# Patient Record
Sex: FEMALE | Race: WHITE | NOT HISPANIC OR LATINO | Employment: OTHER | URBAN - METROPOLITAN AREA
[De-identification: names, ages, dates, MRNs, and addresses within clinical notes are randomized per-mention and may not be internally consistent; named-entity substitution may affect disease eponyms.]

---

## 2017-02-03 DIAGNOSIS — Z78.0 MENOPAUSE: ICD-10-CM

## 2017-04-07 ENCOUNTER — HOSPITAL ENCOUNTER (OUTPATIENT)
Dept: RADIOLOGY | Facility: MEDICAL CENTER | Age: 67
End: 2017-04-07
Attending: FAMILY MEDICINE
Payer: MEDICARE

## 2017-04-07 DIAGNOSIS — Z78.0 MENOPAUSE: ICD-10-CM

## 2017-04-07 DIAGNOSIS — Z00.00 HEALTH CARE MAINTENANCE: ICD-10-CM

## 2017-04-07 PROCEDURE — 77080 DXA BONE DENSITY AXIAL: CPT

## 2017-04-07 PROCEDURE — 77063 BREAST TOMOSYNTHESIS BI: CPT

## 2017-04-10 PROBLEM — M85.80 OSTEOPENIA: Status: ACTIVE | Noted: 2017-04-10

## 2017-05-19 ENCOUNTER — HOSPITAL ENCOUNTER (OUTPATIENT)
Dept: CARDIOLOGY | Facility: MEDICAL CENTER | Age: 67
End: 2017-05-19
Attending: FAMILY MEDICINE
Payer: MEDICARE

## 2017-05-19 DIAGNOSIS — R01.1 HEART MURMUR: ICD-10-CM

## 2017-05-19 LAB
LV EJECT FRACT  99904: 70
LV EJECT FRACT MOD 2C 99903: 70.39
LV EJECT FRACT MOD 4C 99902: 71.16
LV EJECT FRACT MOD BP 99901: 71.36

## 2017-05-19 PROCEDURE — 93306 TTE W/DOPPLER COMPLETE: CPT

## 2017-05-19 PROCEDURE — 93306 TTE W/DOPPLER COMPLETE: CPT | Mod: 26 | Performed by: INTERNAL MEDICINE

## 2017-05-22 DIAGNOSIS — I35.0 AORTIC STENOSIS, MILD: ICD-10-CM

## 2017-05-23 ENCOUNTER — OFFICE VISIT (OUTPATIENT)
Dept: MEDICAL GROUP | Facility: PHYSICIAN GROUP | Age: 67
End: 2017-05-23
Payer: MEDICARE

## 2017-05-23 VITALS
BODY MASS INDEX: 29.81 KG/M2 | TEMPERATURE: 95.9 F | DIASTOLIC BLOOD PRESSURE: 74 MMHG | HEIGHT: 62 IN | RESPIRATION RATE: 14 BRPM | HEART RATE: 60 BPM | OXYGEN SATURATION: 93 % | WEIGHT: 162 LBS | SYSTOLIC BLOOD PRESSURE: 134 MMHG

## 2017-05-23 DIAGNOSIS — I35.0 AORTIC STENOSIS, MILD: ICD-10-CM

## 2017-05-23 DIAGNOSIS — M65.332 TRIGGER MIDDLE FINGER OF LEFT HAND: ICD-10-CM

## 2017-05-23 PROCEDURE — 3014F SCREEN MAMMO DOC REV: CPT | Performed by: FAMILY MEDICINE

## 2017-05-23 PROCEDURE — G8419 CALC BMI OUT NRM PARAM NOF/U: HCPCS | Performed by: FAMILY MEDICINE

## 2017-05-23 PROCEDURE — 1101F PT FALLS ASSESS-DOCD LE1/YR: CPT | Performed by: FAMILY MEDICINE

## 2017-05-23 PROCEDURE — 4040F PNEUMOC VAC/ADMIN/RCVD: CPT | Mod: 8P | Performed by: FAMILY MEDICINE

## 2017-05-23 PROCEDURE — 20550 NJX 1 TENDON SHEATH/LIGAMENT: CPT | Performed by: FAMILY MEDICINE

## 2017-05-23 PROCEDURE — 1036F TOBACCO NON-USER: CPT | Performed by: FAMILY MEDICINE

## 2017-05-23 PROCEDURE — 99212 OFFICE O/P EST SF 10 MIN: CPT | Mod: 25 | Performed by: FAMILY MEDICINE

## 2017-05-23 PROCEDURE — G8432 DEP SCR NOT DOC, RNG: HCPCS | Performed by: FAMILY MEDICINE

## 2017-05-23 PROCEDURE — 3017F COLORECTAL CA SCREEN DOC REV: CPT | Mod: 8P | Performed by: FAMILY MEDICINE

## 2017-05-23 ASSESSMENT — PATIENT HEALTH QUESTIONNAIRE - PHQ9: CLINICAL INTERPRETATION OF PHQ2 SCORE: 0

## 2017-05-23 NOTE — PROGRESS NOTES
Patient comes in complaining of a trigger finger in her left middle finger. It annoys her and is unpredictable. She's had trigger thumbs in the past and I have injected those successfully area and she requests a Kenalog injection for her left middle finger. She has no known trauma to this finger that she can think of. She is right-handed.  She is also here to discuss her recent echocardiogram which showed mild aortic stenosis. She has no shortness of breath. She has no chest pains.    I reviewed the following    History reviewed. No pertinent past medical history.     Past Surgical History   Procedure Laterality Date   • Abdominal hysterectomy total  1989     Precancerous Changes in Ovarian Cyst   • Ovarian cystectomy  1988     left sided Precancerous Cyst---was pregnant at the time  Dr Mckeon   • Appendectomy  1976   • Tonsillectomy and adenoidectomy  1954       Allergies   Allergen Reactions   • Iodine      IV Contrast   Anaphylaxis    • Penicillins      Rash       Current Outpatient Prescriptions   Medication Sig Dispense Refill   • Multiple Vitamin (MULTI-VITAMIN PO) Take  by mouth.     • Calcium 200 MG Tab Take 600 mg by mouth.     • vitamin D3, cholecalciferol, 1000 UNIT Tab Take 2 Tabs by mouth every day. 100 Tab 3   • NON SPECIFIED esratavan OTC     • VITAMIN E PO Take  by mouth.     • naproxen (ALEVE) 220 MG tablet Take 220 mg by mouth 2 times a day, with meals.     • GLUCOSAMINE-CHONDROITIN PO Take  by mouth.       No current facility-administered medications for this visit.        History reviewed. No pertinent family history.    Social History     Social History   • Marital Status:      Spouse Name: N/A   • Number of Children: N/A   • Years of Education: N/A     Occupational History   • Not on file.     Social History Main Topics   • Smoking status: Never Smoker    • Smokeless tobacco: Never Used   • Alcohol Use: Yes      Comment: occasional   • Drug Use: No   • Sexual Activity:     Partners:  Male     Birth Control/ Protection: Surgical      Comment: patient has had a hysterectomy for ovarian cysts.     Other Topics Concern   • Not on file     Social History Narrative          The patient is well-developed well-nourished and in no acute distress    Pupils equally round and reactive to light    Patient has a trigger phenomenon in her left middle finger. I can feel no contractures in either palm    Pulses 2+ in all 4 extremities    1. Trigger middle finger of left hand   The patient was placed supine on the examining table and the palm of the hand was prepped with ACU-dyne. Using a 3 cc syringe and a 1/2 inch 27-gauge needle I injected 1 cc of 40 mg Kenalog, 1/2 cc of 0.5% Marcaine, and 1 cc of 1% plain Xylocaine at the junction between the distal palmar crease and the longitudinal axis of the finger with the needle pointed towards the elbow. The finger was extended and just before the injection was carried out the patient flexed the finger slightly. This was well tolerated. There were no paresthesias and there was no bleeding. A Band-Aid was applied over the injection site.     2. Aortic stenosis, mild   patient has been referred [facility] who will be evaluating her in managing her for this issue.      Recheck when necessary    Please note that this dictation was created using voice recognition software. I have worked with consultants from the vendor as well as technical experts from [facility] [employer] to optimize the interface. I have made every reasonable attempt to correct obvious errors, but I expect that there are errors of grammar and possibly content that I did not discover before finalizing the note.

## 2017-05-23 NOTE — MR AVS SNAPSHOT
"        Regina Wheeler Kraig   2017 1:00 PM   Office Visit   MRN: [id number]    Department:  [facility]   Dept Phone:  [phone]    Description:  Female : 1950   Provider:  Ayaan Cormier M.D.           Reason for Visit     Trigger Finger left middle       Allergies as of 2017     Allergen Noted Reactions    Iodine 2013       IV Contrast   Anaphylaxis     Penicillins 2013       Rash      You were diagnosed with     Trigger middle finger of left hand   [545491]       Aortic stenosis, mild   [188431]         Vital Signs     Blood Pressure Pulse Temperature Respirations Height Weight    134/74 mmHg 60 35.5 °C (95.9 °F) 14 1.575 m (5' 2\") 73.483 kg (162 lb)    Body Mass Index Oxygen Saturation Smoking Status             29.62 kg/m2 93% Never Smoker          Basic Information     Date Of Birth Sex Race Ethnicity Preferred Language    1950 Female White Non- English      Your appointments     2017  3:20 PM   Established Patient with Ayaan Cormier M.D.   [facility] ([place])    [address] [place] [id number]   [phone]           You will be receiving a confirmation call a few days before your appointment from our automated call confirmation system.              Problem List              ICD-10-CM Priority Class Noted - Resolved    Trigger thumb of left hand M65.312   2013 - Present    Menopause Z78.0   7/15/2016 - Present    Vitamin D deficiency E55.9   2016 - Present    Osteopenia M85.80   4/10/2017 - Present    Aortic stenosis, mild I35.0   2017 - Present      Health Maintenance        Date Due Completion Dates    PAP SMEAR 11/15/1970 ---    COLONOSCOPY 11/15/1999 ---    IMM ZOSTER VACCINE 11/15/2009 ---    IMM PNEUMOCOCCAL 65+ (ADULT) LOW/MEDIUM RISK SERIES (1 of 2 - PCV13) 11/15/2014 ---    IMM DTaP/Tdap/Td Vaccine (2 - Td) 2017    MAMMOGRAM 2018    BONE DENSITY 2022   "         Current Immunizations     Influenza TIV (IM) 12/7/2012    Tdap Vaccine 8/2/2007      Below and/or attached are the medications your provider expects you to take. Review all of your home medications and newly ordered medications with your provider and/or pharmacist. Follow medication instructions as directed by your provider and/or pharmacist. Please keep your medication list with you and share with your provider. Update the information when medications are discontinued, doses are changed, or new medications (including over-the-counter products) are added; and carry medication information at all times in the event of emergency situations     Allergies:  IODINE - (reactions not documented)     PENICILLINS - (reactions not documented)               Medications  Valid as of: May 23, 2017 -  2:13 PM    Generic Name Brand Name Tablet Size Instructions for use    Calcium (Tab) Calcium 200 MG Take 600 mg by mouth.        Cholecalciferol (Tab) vitamin D3 (cholecalciferol) 1000 UNIT Take 2 Tabs by mouth every day.        Glucosamine-Chondroitin   Take  by mouth.        Multiple Vitamin   Take  by mouth.        Naproxen Sodium (Tab) ANAPROX 220 MG Take 220 mg by mouth 2 times a day, with meals.        NON SPECIFIED   esratavan OTC        Vitamin E   Take  by mouth.        .                 Medicines prescribed today were sent to:     [id number] - [place], [place] - [address]    [address] [place] [address]    Phone: [phone] Fax: [phone]    Open 24 Hours?: No      Medication refill instructions:       If your prescription bottle indicates you have medication refills left, it is not necessary to call your provider’s office. Please contact your pharmacy and they will refill your medication.    If your prescription bottle indicates you do not have any refills left, you may request refills at any time through one of the following ways: The online [employer] system (except Urgent Care), by  calling your provider’s office, or by asking your pharmacy to contact your provider’s office with a refill request. Medication refills are processed only during regular business hours and may not be available until the next business day. Your provider may request additional information or to have a follow-up visit with you prior to refilling your medication.   *Please Note: Medication refills are assigned a new Rx number when refilled electronically. Your pharmacy may indicate that no refills were authorized even though a new prescription for the same medication is available at the pharmacy. Please request the medicine by name with the pharmacy before contacting your provider for a refill.           [email or web address] Access Code: Activation code not generated  Current [email or web address] Status: Active

## 2018-03-21 ENCOUNTER — OFFICE VISIT (OUTPATIENT)
Dept: MEDICAL GROUP | Facility: PHYSICIAN GROUP | Age: 68
End: 2018-03-21
Payer: MEDICARE

## 2018-03-21 VITALS
BODY MASS INDEX: 29.81 KG/M2 | DIASTOLIC BLOOD PRESSURE: 74 MMHG | RESPIRATION RATE: 14 BRPM | TEMPERATURE: 97.7 F | HEART RATE: 66 BPM | HEIGHT: 62 IN | OXYGEN SATURATION: 95 % | SYSTOLIC BLOOD PRESSURE: 128 MMHG | WEIGHT: 162 LBS

## 2018-03-21 DIAGNOSIS — E78.2 MIXED HYPERLIPIDEMIA: ICD-10-CM

## 2018-03-21 DIAGNOSIS — L57.0 ACTINIC KERATOSES: ICD-10-CM

## 2018-03-21 DIAGNOSIS — I35.0 AORTIC STENOSIS, MILD: ICD-10-CM

## 2018-03-21 DIAGNOSIS — M65.312 TRIGGER THUMB OF LEFT HAND: ICD-10-CM

## 2018-03-21 DIAGNOSIS — Z12.11 COLON CANCER SCREENING: ICD-10-CM

## 2018-03-21 DIAGNOSIS — E55.9 VITAMIN D DEFICIENCY: ICD-10-CM

## 2018-03-21 DIAGNOSIS — M85.80 OSTEOPENIA, UNSPECIFIED LOCATION: ICD-10-CM

## 2018-03-21 DIAGNOSIS — Z23 NEED FOR TDAP VACCINATION: ICD-10-CM

## 2018-03-21 DIAGNOSIS — Z23 NEED FOR PNEUMOCOCCAL VACCINATION: ICD-10-CM

## 2018-03-21 PROCEDURE — 17003 DESTRUCT PREMALG LES 2-14: CPT | Performed by: FAMILY MEDICINE

## 2018-03-21 PROCEDURE — 90670 PCV13 VACCINE IM: CPT | Performed by: FAMILY MEDICINE

## 2018-03-21 PROCEDURE — 17000 DESTRUCT PREMALG LESION: CPT | Performed by: FAMILY MEDICINE

## 2018-03-21 PROCEDURE — 99214 OFFICE O/P EST MOD 30 MIN: CPT | Mod: 25 | Performed by: FAMILY MEDICINE

## 2018-03-21 PROCEDURE — G0009 ADMIN PNEUMOCOCCAL VACCINE: HCPCS | Performed by: FAMILY MEDICINE

## 2018-03-21 NOTE — PROGRESS NOTES
The patient comes in for numerous issues. She last had mammograms in April 2017 that were normal. She has no family history of breast cancer. She has not noted any lumps or masses. She is due for mammograms again in April 2019.  She had a DEXA scan in April 2017 that showed osteopenia. She did not want to take Fosamax as she is taking extra calcium and is exercising more. We do need to check another DEXA scan.  The patient needs to be referred to cardiology for her history of aortic stenosis. She denies shortness of breath or chest pains.  She's had some trigger fingers and they are quiescent for now.  She is due for lab work.  She due for colon cancer screening.  She is due for Prevnar 13.  She is due for Tdap.  She has 2 spots--one on the dorsum of each hand--that are rough and do not heal. She wonders if she should see a dermatologist.      Review of Systems   Constitutional: Negative.  Negative for fever, chills, weight loss and malaise/fatigue.   HENT: Negative for hearing loss, ear pain, congestion, sore throat, neck pain and tinnitus.    Eyes: Negative for blurred vision, double vision and pain.   Respiratory: Negative for cough, hemoptysis, shortness of breath and wheezing.    Cardiovascular: Negative for chest pain, palpitations, orthopnea, claudication, leg swelling and PND.   Gastrointestinal: Negative for heartburn, nausea, vomiting, abdominal pain, diarrhea, constipation, blood in stool and melena.   Genitourinary: Negative for incontinence, dysuria, urgency, frequency and hematuria. Female-- Negative for pelvic pain, vaginal discharge, or abnormal bleeding. No breast lumps, or masses, nipple bleeding or discharge.   Musculoskeletal: Negative for myalgias, back pain and joint pain.   Skin: Negative for rash and itching. One lesion on the dorsum of each hand as mentioned above which have not healed.  Neurological: Negative for dizziness, tingling, tremors, focal weakness, seizures, loss of consciousness  and headaches.   Endo/Heme/Allergies: Negative for environmental allergies and polydipsia.  Does not bruise/bleed easily.   Psychiatric/Behavioral: Negative for depression, memory loss and substance abuse.  The patient is not nervous/anxious and does not have insomnia.  All others negative.      I reviewed the following    History reviewed. No pertinent past medical history.     Past Surgical History:   Procedure Laterality Date   • ABDOMINAL HYSTERECTOMY TOTAL  1989    Precancerous Changes in Ovarian Cyst   • OVARIAN CYSTECTOMY  1988    left sided Precancerous Cyst---was pregnant at the time  Dr Mckeon   • APPENDECTOMY  1976   • TONSILLECTOMY AND ADENOIDECTOMY  1954       Allergies   Allergen Reactions   • Iodine      IV Contrast   Anaphylaxis    • Penicillins      Rash       Current Outpatient Prescriptions   Medication Sig Dispense Refill   • Cyanocobalamin (VITAMIN B 12 PO) Take 1,000 mcg by mouth every day.     • Nutritional Supplements (ESTROVEN PO) Take  by mouth.     • Glucosamine-Chondroitin--400-375 MG Tab Take  by mouth.     • tetanus-dipth-acell pertussis (ADACEL) 5-2-15.5 LF-MCG/0.5 Suspension 0.5 mL by Intramuscular route Once PRN (Give 1 Vial Tdap IM  Z23). 1 Vial 0   • Multiple Vitamin (MULTI-VITAMIN PO) Take  by mouth.     • Calcium 200 MG Tab Take 600 mg by mouth.     • vitamin D3, cholecalciferol, 1000 UNIT Tab Take 2 Tabs by mouth every day. 100 Tab 3   • VITAMIN E PO Take  by mouth.     • naproxen (ALEVE) 220 MG tablet Take 220 mg by mouth 2 times a day, with meals.     • NON SPECIFIED esratavan OTC     • GLUCOSAMINE-CHONDROITIN PO Take  by mouth.       No current facility-administered medications for this visit.         History reviewed. No pertinent family history.    Social History     Social History   • Marital status:      Spouse name: N/A   • Number of children: N/A   • Years of education: N/A     Occupational History   • Not on file.     Social History Main Topics   •  Smoking status: Never Smoker   • Smokeless tobacco: Never Used   • Alcohol use Yes      Comment: occasional   • Drug use: No   • Sexual activity: Yes     Partners: Male     Birth control/ protection: Surgical      Comment: patient has had a hysterectomy for ovarian cysts.     Other Topics Concern   • Not on file     Social History Narrative   • No narrative on file      Physical Exam   Constitutional: She is oriented. She appears well-developed and well-nourished. No distress.   HENT:  Head: Normocephalic and atraumatic.   Right Ear: External ear normal. Ear canal and TM normal   Left Ear: External ear normal. Ear canal and TM normal  Nose: Nose normal.   Mouth/Throat: Oropharynx is clear and moist.   Eyes: Conjunctivae and extraocular motions are normal. Pupils are equal, round, and reactive to light. Fundi benign bilaterally   Neck: No thyromegaly present.   Cardiovascular: Normal rate, regular rhythm, normal heart sounds and intact distal pulses.  Exam reveals no gallop.    No murmur heard.  Pulmonary/Chest: Effort normal and breath sounds normal. No respiratory distress. She has no wheezes. She has no rales.   Abdominal: Soft. Bowel sounds are normal. No hepatosplenomegaly She exhibits no distension. No tenderness. She has no rebound and no guarding.   Musculoskeletal: Normal range of motion. She exhibits no edema and no tenderness.   Lymphadenopathy:     She has no cervical adenopathy.   No supraclavicular adenopathy  Neurological: She is alert and oriented. She has normal reflexes.        Babinskis downgoing bilaterally   Skin: Skin is warm and dry. No rash noted. No erythema. She has a lesion of the dorsum of the right hand which could be an early basal cell skin cancer or possibly an irritated actinic keratosis. On the left hand on the dorsum she has an actinic keratosis.  Psychiatric: She has a normal mood and appropriate affect. Her behavior is normal. Judgment and thought content normal.     1. Aortic  stenosis, mild  REFERRAL TO CARDIOLOGY--Dr. Maureen Lockhart    2. Trigger thumb of left hand   Quiescent    3. Vitamin D deficiency --needs reassessment  VITAMIN D 25-HYDROXY   4. Mixed hyperlipidemia--needs reassessment  CBC WITH DIFFERENTIAL    COMP METABOLIC PANEL    LIPID PROFILE   5. Colon cancer screening  OCCULT BLOOD FECES IMMUNOASSAY   6. Need for pneumococcal vaccination  PNEUMOCOCCAL CONJUGATE VACCINE 13-VALENT   7. Need for Tdap vaccination  tetanus-dipth-acell pertussis (ADACEL) 5-2-15.5 LF-MCG/0.5 Suspension--given prescription of his time outside pharmacy    8. Actinic keratoses  REFERRAL TO DERMATOLOGY--Dr. Townsend or partner     All actinic keratoses on the patient's skin were identified and then were frozen with liquid nitrogen. The patient tolerated this well and was advised that if these lesions did not disappear within 2 weeks or that if any problems occurred that the patient was to return.     9. Osteopenia, unspecified location  DS-BONE DENSITY STUDY (DEXA)     Recheck one year or when necessary above results    Please note that this dictation was created using voice recognition software. I have worked with consultants from the vendor as well as technical experts from [employer] to optimize the interface. I have made every reasonable attempt to correct obvious errors, but I expect that there are errors of grammar and possibly content that I did not discover before finalizing the note.

## 2018-03-21 NOTE — PATIENT INSTRUCTIONS
Patient given written instructions regarding labs, imaging, medications, referrals, dietary and lifestyle management, and return visit.    Ayaan Cormier MD

## 2018-03-23 ENCOUNTER — TELEPHONE (OUTPATIENT)
Dept: MEDICAL GROUP | Facility: PHYSICIAN GROUP | Age: 68
End: 2018-03-23

## 2018-03-23 NOTE — TELEPHONE ENCOUNTER
Imaging called stating that Medicare will not cover DEXA scan yearly only every 2 years under current Dx: Osteopenia.   Her health  maintenance states she good until year 2022.   If you want her to have the scan she needs to have the Dx: of Osteoporosis documented in chart.  Please advise:

## 2018-03-26 ENCOUNTER — HOSPITAL ENCOUNTER (OUTPATIENT)
Dept: LAB | Facility: MEDICAL CENTER | Age: 68
End: 2018-03-26
Attending: FAMILY MEDICINE
Payer: MEDICARE

## 2018-03-26 DIAGNOSIS — M81.0 AGE-RELATED OSTEOPOROSIS WITHOUT CURRENT PATHOLOGICAL FRACTURE: ICD-10-CM

## 2018-03-26 DIAGNOSIS — E55.9 VITAMIN D DEFICIENCY: ICD-10-CM

## 2018-03-26 DIAGNOSIS — E78.2 MIXED HYPERLIPIDEMIA: ICD-10-CM

## 2018-03-26 LAB
25(OH)D3 SERPL-MCNC: 25 NG/ML (ref 30–100)
ALBUMIN SERPL BCP-MCNC: 4.1 G/DL (ref 3.2–4.9)
ALBUMIN/GLOB SERPL: 1.6 G/DL
ALP SERPL-CCNC: 78 U/L (ref 30–99)
ALT SERPL-CCNC: 33 U/L (ref 2–50)
ANION GAP SERPL CALC-SCNC: 6 MMOL/L (ref 0–11.9)
AST SERPL-CCNC: 25 U/L (ref 12–45)
BASOPHILS # BLD AUTO: 0.9 % (ref 0–1.8)
BASOPHILS # BLD: 0.04 K/UL (ref 0–0.12)
BILIRUB SERPL-MCNC: 0.5 MG/DL (ref 0.1–1.5)
BUN SERPL-MCNC: 20 MG/DL (ref 8–22)
CALCIUM SERPL-MCNC: 9.1 MG/DL (ref 8.5–10.5)
CHLORIDE SERPL-SCNC: 109 MMOL/L (ref 96–112)
CHOLEST SERPL-MCNC: 209 MG/DL (ref 100–199)
CO2 SERPL-SCNC: 24 MMOL/L (ref 20–33)
CREAT SERPL-MCNC: 0.62 MG/DL (ref 0.5–1.4)
EOSINOPHIL # BLD AUTO: 0.15 K/UL (ref 0–0.51)
EOSINOPHIL NFR BLD: 3.3 % (ref 0–6.9)
ERYTHROCYTE [DISTWIDTH] IN BLOOD BY AUTOMATED COUNT: 49.1 FL (ref 35.9–50)
GLOBULIN SER CALC-MCNC: 2.6 G/DL (ref 1.9–3.5)
GLUCOSE SERPL-MCNC: 100 MG/DL (ref 65–99)
HCT VFR BLD AUTO: 47.8 % (ref 37–47)
HDLC SERPL-MCNC: 71 MG/DL
HGB BLD-MCNC: 15.2 G/DL (ref 12–16)
IMM GRANULOCYTES # BLD AUTO: 0.01 K/UL (ref 0–0.11)
IMM GRANULOCYTES NFR BLD AUTO: 0.2 % (ref 0–0.9)
LDLC SERPL CALC-MCNC: 128 MG/DL
LYMPHOCYTES # BLD AUTO: 1.65 K/UL (ref 1–4.8)
LYMPHOCYTES NFR BLD: 36.7 % (ref 22–41)
MCH RBC QN AUTO: 29.5 PG (ref 27–33)
MCHC RBC AUTO-ENTMCNC: 31.8 G/DL (ref 33.6–35)
MCV RBC AUTO: 92.8 FL (ref 81.4–97.8)
MONOCYTES # BLD AUTO: 0.43 K/UL (ref 0–0.85)
MONOCYTES NFR BLD AUTO: 9.6 % (ref 0–13.4)
NEUTROPHILS # BLD AUTO: 2.22 K/UL (ref 2–7.15)
NEUTROPHILS NFR BLD: 49.3 % (ref 44–72)
NRBC # BLD AUTO: 0 K/UL
NRBC BLD-RTO: 0 /100 WBC
PLATELET # BLD AUTO: 220 K/UL (ref 164–446)
PMV BLD AUTO: 10.6 FL (ref 9–12.9)
POTASSIUM SERPL-SCNC: 4.3 MMOL/L (ref 3.6–5.5)
PROT SERPL-MCNC: 6.7 G/DL (ref 6–8.2)
RBC # BLD AUTO: 5.15 M/UL (ref 4.2–5.4)
SODIUM SERPL-SCNC: 139 MMOL/L (ref 135–145)
TRIGL SERPL-MCNC: 48 MG/DL (ref 0–149)
WBC # BLD AUTO: 4.5 K/UL (ref 4.8–10.8)

## 2018-03-26 PROCEDURE — 82306 VITAMIN D 25 HYDROXY: CPT

## 2018-03-26 PROCEDURE — 85025 COMPLETE CBC W/AUTO DIFF WBC: CPT

## 2018-03-26 PROCEDURE — 36415 COLL VENOUS BLD VENIPUNCTURE: CPT

## 2018-03-26 PROCEDURE — 80053 COMPREHEN METABOLIC PANEL: CPT

## 2018-03-26 PROCEDURE — 80061 LIPID PANEL: CPT

## 2018-03-26 RX ORDER — MELATONIN
3000 DAILY
Qty: 100 TAB | Refills: 3 | Status: ON HOLD
Start: 2018-03-26 | End: 2019-11-06

## 2018-05-17 ENCOUNTER — APPOINTMENT (RX ONLY)
Dept: URBAN - METROPOLITAN AREA CLINIC 20 | Facility: CLINIC | Age: 68
Setting detail: DERMATOLOGY
End: 2018-05-17

## 2018-05-17 DIAGNOSIS — L82.1 OTHER SEBORRHEIC KERATOSIS: ICD-10-CM

## 2018-05-17 DIAGNOSIS — Z12.83 ENCOUNTER FOR SCREENING FOR MALIGNANT NEOPLASM OF SKIN: ICD-10-CM

## 2018-05-17 DIAGNOSIS — D22 MELANOCYTIC NEVI: ICD-10-CM

## 2018-05-17 DIAGNOSIS — L95.9 VASCULITIS LIMITED TO THE SKIN, UNSPECIFIED: ICD-10-CM

## 2018-05-17 DIAGNOSIS — L82.0 INFLAMED SEBORRHEIC KERATOSIS: ICD-10-CM

## 2018-05-17 DIAGNOSIS — L81.0 POSTINFLAMMATORY HYPERPIGMENTATION: ICD-10-CM

## 2018-05-17 DIAGNOSIS — L57.0 ACTINIC KERATOSIS: ICD-10-CM

## 2018-05-17 DIAGNOSIS — D18.0 HEMANGIOMA: ICD-10-CM

## 2018-05-17 DIAGNOSIS — L81.4 OTHER MELANIN HYPERPIGMENTATION: ICD-10-CM

## 2018-05-17 PROBLEM — D22.39 MELANOCYTIC NEVI OF OTHER PARTS OF FACE: Status: ACTIVE | Noted: 2018-05-17

## 2018-05-17 PROBLEM — D23.62 OTHER BENIGN NEOPLASM OF SKIN OF LEFT UPPER LIMB, INCLUDING SHOULDER: Status: ACTIVE | Noted: 2018-05-17

## 2018-05-17 PROBLEM — D22.61 MELANOCYTIC NEVI OF RIGHT UPPER LIMB, INCLUDING SHOULDER: Status: ACTIVE | Noted: 2018-05-17

## 2018-05-17 PROBLEM — D18.01 HEMANGIOMA OF SKIN AND SUBCUTANEOUS TISSUE: Status: ACTIVE | Noted: 2018-05-17

## 2018-05-17 PROCEDURE — ? LIQUID NITROGEN

## 2018-05-17 PROCEDURE — 17003 DESTRUCT PREMALG LES 2-14: CPT

## 2018-05-17 PROCEDURE — 99203 OFFICE O/P NEW LOW 30 MIN: CPT | Mod: 25

## 2018-05-17 PROCEDURE — 17110 DESTRUCTION B9 LES UP TO 14: CPT

## 2018-05-17 PROCEDURE — 17000 DESTRUCT PREMALG LESION: CPT | Mod: 59

## 2018-05-17 PROCEDURE — ? COUNSELING

## 2018-05-17 ASSESSMENT — LOCATION ZONE DERM
LOCATION ZONE: SCALP
LOCATION ZONE: NOSE
LOCATION ZONE: FACE
LOCATION ZONE: HAND
LOCATION ZONE: ARM
LOCATION ZONE: TRUNK

## 2018-05-17 ASSESSMENT — LOCATION SIMPLE DESCRIPTION DERM
LOCATION SIMPLE: NOSE
LOCATION SIMPLE: LEFT CHEEK
LOCATION SIMPLE: CHEST
LOCATION SIMPLE: LEFT HAND
LOCATION SIMPLE: RIGHT HAND
LOCATION SIMPLE: HAIR
LOCATION SIMPLE: RIGHT POSTERIOR UPPER ARM
LOCATION SIMPLE: LEFT EYEBROW
LOCATION SIMPLE: RIGHT CHEEK
LOCATION SIMPLE: ABDOMEN
LOCATION SIMPLE: RIGHT UPPER BACK
LOCATION SIMPLE: LEFT SCALP

## 2018-05-17 ASSESSMENT — LOCATION DETAILED DESCRIPTION DERM
LOCATION DETAILED: LEFT LATERAL EYEBROW
LOCATION DETAILED: RIGHT LATERAL MALAR CHEEK
LOCATION DETAILED: RIGHT CENTRAL MALAR CHEEK
LOCATION DETAILED: HAIR
LOCATION DETAILED: LEFT INFERIOR CENTRAL MALAR CHEEK
LOCATION DETAILED: RIGHT INFERIOR CENTRAL MALAR CHEEK
LOCATION DETAILED: UPPER STERNUM
LOCATION DETAILED: RIGHT SUPERIOR MEDIAL UPPER BACK
LOCATION DETAILED: RIGHT PROXIMAL POSTERIOR UPPER ARM
LOCATION DETAILED: LEFT MEDIAL FRONTAL SCALP
LOCATION DETAILED: NASAL DORSUM
LOCATION DETAILED: SUBXIPHOID
LOCATION DETAILED: LEFT RADIAL DORSAL HAND
LOCATION DETAILED: RIGHT RADIAL DORSAL HAND
LOCATION DETAILED: LEFT CENTRAL MALAR CHEEK
LOCATION DETAILED: LEFT MEDIAL MALAR CHEEK

## 2018-05-17 NOTE — PROCEDURE: COUNSELING
Detail Level: Generalized
Detail Level: Zone
Detail Level: Detailed
Patient Specific Counseling (Will Not Stick From Patient To Patient): Present since birth with no changes per pt.

## 2018-05-17 NOTE — PROCEDURE: LIQUID NITROGEN
Render Post-Care Instructions In Note?: yes
Post-Care Instructions: I reviewed with the patient in detail post-care instructions. Patient is to avoid picking at any of the treated lesions. Pt may apply Vaseline to crusted or scabbing areas.
Number Of Freeze-Thaw Cycles: 3 freeze-thaw cycles
Consent: The patient's consent was obtained including but not limited to risks of crusting, scabbing, blistering, scarring, darker or lighter pigmentary change, recurrence, incomplete removal and infection.
Add 52 Modifier (Optional): no
Medical Necessity Information: It is in your best interest to select a reason for this procedure from the list below. All of these items fulfill various CMS LCD requirements except the new and changing color options.
Detail Level: Detailed
Medical Necessity Clause: This procedure was medically necessary because the lesions that were treated were: inflamed
Duration Of Freeze Thaw-Cycle (Seconds): 0
Post-Care Instructions: I reviewed with the patient in detail post-care instructions. Patient is to wear sun protection and avoid picking at any of the treated lesions. Pt may apply Vaseline to crusted or scabbing areas.

## 2018-06-27 ENCOUNTER — OFFICE VISIT (OUTPATIENT)
Dept: MEDICAL GROUP | Facility: PHYSICIAN GROUP | Age: 68
End: 2018-06-27
Payer: MEDICARE

## 2018-06-27 VITALS
TEMPERATURE: 97.7 F | HEART RATE: 66 BPM | DIASTOLIC BLOOD PRESSURE: 75 MMHG | BODY MASS INDEX: 30.91 KG/M2 | HEIGHT: 62 IN | WEIGHT: 168 LBS | RESPIRATION RATE: 14 BRPM | SYSTOLIC BLOOD PRESSURE: 132 MMHG | OXYGEN SATURATION: 96 %

## 2018-06-27 DIAGNOSIS — M65.332 TRIGGER FINGER, LEFT MIDDLE FINGER: ICD-10-CM

## 2018-06-27 DIAGNOSIS — I35.0 AORTIC STENOSIS, MILD: ICD-10-CM

## 2018-06-27 DIAGNOSIS — E66.9 OBESITY (BMI 30-39.9): ICD-10-CM

## 2018-06-27 PROCEDURE — 20550 NJX 1 TENDON SHEATH/LIGAMENT: CPT | Performed by: FAMILY MEDICINE

## 2018-06-27 PROCEDURE — 99212 OFFICE O/P EST SF 10 MIN: CPT | Mod: 25 | Performed by: FAMILY MEDICINE

## 2018-06-27 ASSESSMENT — PATIENT HEALTH QUESTIONNAIRE - PHQ9: CLINICAL INTERPRETATION OF PHQ2 SCORE: 0

## 2018-06-28 NOTE — PATIENT INSTRUCTIONS
Patient given written instructions regarding labs, imaging, medications, referrals, dietary and lifestyle management, and return visit.    Ayaan Cormeir MD

## 2018-06-28 NOTE — PROGRESS NOTES
Patient comes in complaining of a trigger phenomenon in her left middle finger.  The last time we injected this was in May of last year.  She requests another injection because it helped her and she is right-handed.  The patient is going to be seeing Dr. Lockhart in the near future about her mild aortic stenosis.  She denies any increased shortness of breath or chest pains.  She is working on losing weight.  I encouraged her.    I reviewed the following    No past medical history on file.     Past Surgical History:   Procedure Laterality Date   • ABDOMINAL HYSTERECTOMY TOTAL  1989    Precancerous Changes in Ovarian Cyst   • OVARIAN CYSTECTOMY  1988    left sided Precancerous Cyst---was pregnant at the time  Dr Mckeon   • APPENDECTOMY  1976   • TONSILLECTOMY AND ADENOIDECTOMY  1954       Allergies   Allergen Reactions   • Iodine      IV Contrast   Anaphylaxis    • Penicillins      Rash       Current Outpatient Prescriptions   Medication Sig Dispense Refill   • vitamin D3, cholecalciferol, 1000 UNIT Tab Take 3 Tabs by mouth every day. 100 Tab 3   • Cyanocobalamin (VITAMIN B 12 PO) Take 1,000 mcg by mouth every day.     • Nutritional Supplements (ESTROVEN PO) Take  by mouth.     • Glucosamine-Chondroitin--400-375 MG Tab Take  by mouth.     • Multiple Vitamin (MULTI-VITAMIN PO) Take  by mouth.     • Calcium 200 MG Tab Take 600 mg by mouth.     • VITAMIN E PO Take  by mouth.     • GLUCOSAMINE-CHONDROITIN PO Take  by mouth.     • naproxen (ALEVE) 220 MG tablet Take 220 mg by mouth 2 times a day, with meals.     • tetanus-dipth-acell pertussis (ADACEL) 5-2-15.5 LF-MCG/0.5 Suspension 0.5 mL by Intramuscular route Once PRN (Give 1 Vial Tdap IM  Z23). 1 Vial 0   • NON SPECIFIED esratavan OTC       No current facility-administered medications for this visit.         No family history on file.    Social History     Social History   • Marital status:      Spouse name: N/A   • Number of children: N/A   • Years of  education: N/A     Occupational History   • Not on file.     Social History Main Topics   • Smoking status: Never Smoker   • Smokeless tobacco: Never Used   • Alcohol use Yes      Comment: occasional   • Drug use: No   • Sexual activity: Yes     Partners: Male     Birth control/ protection: Surgical      Comment: patient has had a hysterectomy for ovarian cysts.     Other Topics Concern   • Not on file     Social History Narrative   • No narrative on file      The patient is well-developed well-nourished and in no acute distress    Pupils equally round and reactive to light    Ears normal    Nose normal    Throat clear    Neck supple no cervical adenopathy no thyromegaly    Chest clear to auscultation    Heart regular rhythm grade 2/6 to 3/6 systolic murmur at the aortic area.  S3 or S4 appreciated    Back no CVA pain or spasm    Abdomen flat soft good bowel sounds no mass No hepatosplenomegaly no rebound    Skin no rashes or suspicious lesions    Left middle finger does have some decreased ability to extend.  She does not trigger quite as much as she is done in the past but she says that it feels swollen.  She denies any recent trauma.  I would not try to inject it again to see if we can help    1. Aortic stenosis, mild   patient will see Dr. Lockhart later this year.   2. Trigger finger, left middle finger   The patient was placed supine on the examining table and the palm of the hand was prepped with ACU-dyne. Using a 3 cc syringe and a 1/2 inch 27-gauge needle I injected 1 cc of 40 mg Kenalog, 1/2 cc of 0.5% Marcaine, and 1 cc of 1% plain Xylocaine at the junction between the distal palmar crease and the longitudinal axis of the finger with the needle pointed towards the elbow. The finger was extended and just before the injection was carried out the patient flexed the finger slightly. This was well tolerated. There were no paresthesias and there was no bleeding. A Band-Aid was applied over the injection site.      3. Obesity (BMI 30-39.9)  Patient identified as having weight management issue.  Appropriate orders and counseling given.     Recheck as needed    Please note that this dictation was created using voice recognition software. I have worked with consultants from the vendor as well as technical experts from [facility] to optimize the interface. I have made every reasonable attempt to correct obvious errors, but I expect that there are errors of grammar and possibly content that I did not discover before finalizing the note.

## 2018-07-10 DIAGNOSIS — M72.0 DUPUYTREN'S CONTRACTURE OF LEFT HAND: ICD-10-CM

## 2018-07-20 ENCOUNTER — APPOINTMENT (RX ONLY)
Dept: URBAN - METROPOLITAN AREA CLINIC 20 | Facility: CLINIC | Age: 68
Setting detail: DERMATOLOGY
End: 2018-07-20

## 2018-07-20 DIAGNOSIS — L57.0 ACTINIC KERATOSIS: ICD-10-CM

## 2018-07-20 DIAGNOSIS — D22 MELANOCYTIC NEVI: ICD-10-CM

## 2018-07-20 DIAGNOSIS — L82.1 OTHER SEBORRHEIC KERATOSIS: ICD-10-CM

## 2018-07-20 DIAGNOSIS — L82.0 INFLAMED SEBORRHEIC KERATOSIS: ICD-10-CM | Status: RESOLVED

## 2018-07-20 DIAGNOSIS — L81.4 OTHER MELANIN HYPERPIGMENTATION: ICD-10-CM

## 2018-07-20 PROBLEM — D22.39 MELANOCYTIC NEVI OF OTHER PARTS OF FACE: Status: ACTIVE | Noted: 2018-07-20

## 2018-07-20 PROCEDURE — ? PRESCRIPTION

## 2018-07-20 PROCEDURE — ? LIQUID NITROGEN

## 2018-07-20 PROCEDURE — 17003 DESTRUCT PREMALG LES 2-14: CPT

## 2018-07-20 PROCEDURE — 17000 DESTRUCT PREMALG LESION: CPT

## 2018-07-20 PROCEDURE — ? COUNSELING

## 2018-07-20 PROCEDURE — 99213 OFFICE O/P EST LOW 20 MIN: CPT | Mod: 25

## 2018-07-20 RX ORDER — FLUOROURACIL 2 G/40G
CREAM TOPICAL
Qty: 1 | Refills: 1 | Status: ERX | COMMUNITY
Start: 2018-07-20

## 2018-07-20 RX ADMIN — FLUOROURACIL: 2 CREAM TOPICAL at 16:08

## 2018-07-20 ASSESSMENT — LOCATION DETAILED DESCRIPTION DERM
LOCATION DETAILED: LEFT SUPERIOR MEDIAL MALAR CHEEK
LOCATION DETAILED: RIGHT INFERIOR CENTRAL MALAR CHEEK
LOCATION DETAILED: LEFT CENTRAL MALAR CHEEK
LOCATION DETAILED: RIGHT MID TEMPLE
LOCATION DETAILED: LEFT INFERIOR CENTRAL MALAR CHEEK
LOCATION DETAILED: RIGHT SUPERIOR FOREHEAD

## 2018-07-20 ASSESSMENT — LOCATION SIMPLE DESCRIPTION DERM
LOCATION SIMPLE: RIGHT FOREHEAD
LOCATION SIMPLE: RIGHT TEMPLE
LOCATION SIMPLE: LEFT CHEEK
LOCATION SIMPLE: RIGHT CHEEK

## 2018-07-20 ASSESSMENT — LOCATION ZONE DERM: LOCATION ZONE: FACE

## 2018-07-20 NOTE — PROCEDURE: LIQUID NITROGEN
Render Post-Care Instructions In Note?: yes
Post-Care Instructions: I reviewed with the patient in detail post-care instructions. Patient is to wear sun protection and avoid picking at any of the treated lesions. Pt may apply Vaseline to crusted or scabbing areas.
Consent: The patient's consent was obtained including but not limited to risks of crusting, scabbing, blistering, scarring, darker or lighter pigmentary change, recurrence, incomplete removal and infection.
Detail Level: Detailed
Duration Of Freeze Thaw-Cycle (Seconds): 0

## 2018-08-02 ENCOUNTER — OFFICE VISIT (OUTPATIENT)
Dept: CARDIOLOGY | Facility: MEDICAL CENTER | Age: 68
End: 2018-08-02
Payer: MEDICARE

## 2018-08-02 VITALS
DIASTOLIC BLOOD PRESSURE: 78 MMHG | SYSTOLIC BLOOD PRESSURE: 122 MMHG | WEIGHT: 167 LBS | BODY MASS INDEX: 30.73 KG/M2 | HEART RATE: 50 BPM | HEIGHT: 62 IN

## 2018-08-02 DIAGNOSIS — I35.0 AORTIC VALVE STENOSIS, ETIOLOGY OF CARDIAC VALVE DISEASE UNSPECIFIED: ICD-10-CM

## 2018-08-02 DIAGNOSIS — I35.0 AORTIC STENOSIS, MILD: ICD-10-CM

## 2018-08-02 DIAGNOSIS — R93.1 ABNORMAL ECHOCARDIOGRAM: ICD-10-CM

## 2018-08-02 LAB — EKG IMPRESSION: NORMAL

## 2018-08-02 PROCEDURE — 99204 OFFICE O/P NEW MOD 45 MIN: CPT | Mod: 25 | Performed by: INTERNAL MEDICINE

## 2018-08-02 PROCEDURE — 93000 ELECTROCARDIOGRAM COMPLETE: CPT | Performed by: INTERNAL MEDICINE

## 2018-08-02 ASSESSMENT — ENCOUNTER SYMPTOMS
MYALGIAS: 0
PALPITATIONS: 0
COUGH: 0
BRUISES/BLEEDS EASILY: 0
SHORTNESS OF BREATH: 0
EYES NEGATIVE: 1
NAUSEA: 0
DIZZINESS: 0
DEPRESSION: 0
LOSS OF CONSCIOUSNESS: 0
WEIGHT LOSS: 0
INSOMNIA: 0
HEARTBURN: 0
PND: 0
NERVOUS/ANXIOUS: 0

## 2018-08-02 NOTE — PROGRESS NOTES
Chief Complaint   Patient presents with   • Aortic Stenosis       Subjective:   Regina Cornell is a 67 y.o. female who presents today as a new patient.  She is here sent by Dr. Cormier in consultation in regards to her abnormal echocardiogram    Very healthy and active lives with her  who is a dentist in [facility].  He has heart disease/aneurysm    Very active [place] and horses, rides every day no trouble hiking no limitations    No strong family history has gained some weight tries to watch her cholesterol and lipids    History reviewed. No pertinent past medical history.  Past Surgical History:   Procedure Laterality Date   • ABDOMINAL HYSTERECTOMY TOTAL  1989    Precancerous Changes in Ovarian Cyst   • OVARIAN CYSTECTOMY  1988    left sided Precancerous Cyst---was pregnant at the time  Dr Mckeon   • APPENDECTOMY  1976   • TONSILLECTOMY AND ADENOIDECTOMY  1954     History reviewed. No pertinent family history.  Social History     Social History   • Marital status:      Spouse name: N/A   • Number of children: N/A   • Years of education: N/A     Occupational History   • Not on file.     Social History Main Topics   • Smoking status: Never Smoker   • Smokeless tobacco: Never Used   • Alcohol use Yes      Comment: occasional   • Drug use: No   • Sexual activity: Yes     Partners: Male     Birth control/ protection: Surgical      Comment: patient has had a hysterectomy for ovarian cysts.     Other Topics Concern   • Not on file     Social History Narrative   • No narrative on file     Allergies   Allergen Reactions   • Iodine      IV Contrast   Anaphylaxis    • Penicillins      Rash     Outpatient Encounter Prescriptions as of 8/2/2018   Medication Sig Dispense Refill   • vitamin D3, cholecalciferol, 1000 UNIT Tab Take 3 Tabs by mouth every day. 100 Tab 3   • Cyanocobalamin (VITAMIN B 12 PO) Take 1,000 mcg by mouth every day.     • Glucosamine-Chondroitin--400-375 MG Tab Take  by  "mouth.     • tetanus-dipth-acell pertussis (ADACEL) 5-2-15.5 LF-MCG/0.5 Suspension 0.5 mL by Intramuscular route Once PRN (Give 1 Vial Tdap IM  Z23). 1 Vial 0   • Multiple Vitamin (MULTI-VITAMIN PO) Take  by mouth.     • Calcium 200 MG Tab Take 600 mg by mouth.     • NON SPECIFIED esratavan OTC     • VITAMIN E PO Take  by mouth.     • GLUCOSAMINE-CHONDROITIN PO Take  by mouth.     • naproxen (ALEVE) 220 MG tablet Take 220 mg by mouth 2 times a day, with meals.     • [DISCONTINUED] Nutritional Supplements (ESTROVEN PO) Take  by mouth.       No facility-administered encounter medications on file as of 8/2/2018.      Review of Systems   Constitutional: Negative for malaise/fatigue and weight loss.   HENT: Negative for hearing loss and tinnitus.    Eyes: Negative.    Respiratory: Negative for cough and shortness of breath.    Cardiovascular: Negative for chest pain, palpitations, leg swelling and PND.   Gastrointestinal: Negative for heartburn and nausea.   Genitourinary: Negative for dysuria.   Musculoskeletal: Negative for joint pain and myalgias.   Skin: Negative for rash.   Neurological: Negative for dizziness and loss of consciousness.   Endo/Heme/Allergies: Does not bruise/bleed easily.   Psychiatric/Behavioral: Negative for depression. The patient is not nervous/anxious and does not have insomnia.    All other systems reviewed and are negative.       Objective:   /78   Pulse (!) 50   Ht 1.575 m (5' 2\")   Wt 75.8 kg (167 lb)   BMI 30.54 kg/m²     Physical Exam   Constitutional: She is oriented to person, place, and time. She appears well-developed and well-nourished.   HENT:   Head: Normocephalic and atraumatic.   Eyes: Pupils are equal, round, and reactive to light. EOM are normal. No scleral icterus.   Neck: No JVD present. No thyromegaly present.   Cardiovascular: Normal rate, regular rhythm and intact distal pulses.    Murmur (1/6 palmira, normal upstrokes) heard.  Pulmonary/Chest: Breath sounds normal. " No respiratory distress. She exhibits no tenderness.   Abdominal: Bowel sounds are normal. She exhibits no distension.   Musculoskeletal: She exhibits no edema or tenderness.   Lymphadenopathy:     She has no cervical adenopathy.   Neurological: She is alert and oriented to person, place, and time. She exhibits normal muscle tone. Coordination normal.   Skin: Skin is warm and dry. No rash noted.   Psychiatric: She has a normal mood and affect. Her behavior is normal.       Assessment:     1. Aortic valve stenosis, etiology of cardiac valve disease unspecified  EKG   2. Abnormal echocardiogram         Medical Decision Making:  Today's Assessment / Status / Plan:     Twelve-lead EKG done and read by me today.  Normal sinus rhythm normal EKG  Echocardiogram images looked at together with us.  Very mild aortic stenosis otherwise normal heart on echo    Aortic stenosis mild  Talked about aging and physiology.  Normal for age, repeat in 1-3 years per guidelines  No limitations in activity.  Mild bradycardia on EKG likely due to health rather than disease    Cardiac risk factors  Went in detail of her guidelines about lipids and blood pressure both of which are slightly high.  She will work on diet and exercise will be for at least a year  Repeat with PCP or as needed    Questions answered we spent more than 45 minutes going over guidelines and talking  RTC as needed

## 2018-09-14 ENCOUNTER — APPOINTMENT (RX ONLY)
Dept: URBAN - METROPOLITAN AREA CLINIC 20 | Facility: CLINIC | Age: 68
Setting detail: DERMATOLOGY
End: 2018-09-14

## 2018-09-14 DIAGNOSIS — L82.1 OTHER SEBORRHEIC KERATOSIS: ICD-10-CM

## 2018-09-14 DIAGNOSIS — L81.4 OTHER MELANIN HYPERPIGMENTATION: ICD-10-CM

## 2018-09-14 DIAGNOSIS — L57.0 ACTINIC KERATOSIS: ICD-10-CM | Status: IMPROVED

## 2018-09-14 PROCEDURE — ? COUNSELING

## 2018-09-14 PROCEDURE — 17003 DESTRUCT PREMALG LES 2-14: CPT

## 2018-09-14 PROCEDURE — 99213 OFFICE O/P EST LOW 20 MIN: CPT | Mod: 25

## 2018-09-14 PROCEDURE — ? LIQUID NITROGEN

## 2018-09-14 PROCEDURE — 17000 DESTRUCT PREMALG LESION: CPT

## 2018-09-14 PROCEDURE — ? PRESCRIPTION MEDICATION MANAGEMENT

## 2018-09-14 ASSESSMENT — LOCATION SIMPLE DESCRIPTION DERM
LOCATION SIMPLE: LEFT CHEEK
LOCATION SIMPLE: RIGHT CHEEK

## 2018-09-14 ASSESSMENT — LOCATION DETAILED DESCRIPTION DERM
LOCATION DETAILED: LEFT LATERAL MALAR CHEEK
LOCATION DETAILED: RIGHT INFERIOR CENTRAL MALAR CHEEK
LOCATION DETAILED: RIGHT SUPERIOR LATERAL MALAR CHEEK
LOCATION DETAILED: RIGHT SUPERIOR MEDIAL BUCCAL CHEEK
LOCATION DETAILED: LEFT CENTRAL MALAR CHEEK
LOCATION DETAILED: LEFT INFERIOR CENTRAL MALAR CHEEK
LOCATION DETAILED: RIGHT SUPERIOR LATERAL BUCCAL CHEEK

## 2018-09-14 ASSESSMENT — LOCATION ZONE DERM: LOCATION ZONE: FACE

## 2018-09-14 NOTE — PROCEDURE: COUNSELING
Patient Specific Counseling (Will Not Stick From Patient To Patient): AKs clear on nose s/p Efudex.  Will now treat the AKs on the cheeks with Efudex after she heals from LN2.
Detail Level: Generalized
Detail Level: Zone

## 2018-09-14 NOTE — PROCEDURE: LIQUID NITROGEN
Duration Of Freeze Thaw-Cycle (Seconds): 0
Consent: The patient's consent was obtained including but not limited to risks of crusting, scabbing, blistering, scarring, darker or lighter pigmentary change, recurrence, incomplete removal and infection.
Post-Care Instructions: I reviewed with the patient in detail post-care instructions. Patient is to wear sun protection and avoid picking at any of the treated lesions. Pt may apply Vaseline to crusted or scabbing areas.
Detail Level: Detailed
Render Post-Care Instructions In Note?: yes

## 2018-09-14 NOTE — PROCEDURE: PRESCRIPTION MEDICATION MANAGEMENT
Continue Regimen: Efudex 5% cream bid for 2-4 weeks as tolerated after healing to the cheeks
Detail Level: Zone

## 2018-11-15 ENCOUNTER — APPOINTMENT (RX ONLY)
Dept: URBAN - METROPOLITAN AREA CLINIC 22 | Facility: CLINIC | Age: 68
Setting detail: DERMATOLOGY
End: 2018-11-15

## 2018-11-15 DIAGNOSIS — L81.4 OTHER MELANIN HYPERPIGMENTATION: ICD-10-CM

## 2018-11-15 DIAGNOSIS — L57.0 ACTINIC KERATOSIS: ICD-10-CM | Status: RESOLVED

## 2018-11-15 DIAGNOSIS — D22 MELANOCYTIC NEVI: ICD-10-CM

## 2018-11-15 DIAGNOSIS — L82.1 OTHER SEBORRHEIC KERATOSIS: ICD-10-CM

## 2018-11-15 PROBLEM — D22.39 MELANOCYTIC NEVI OF OTHER PARTS OF FACE: Status: ACTIVE | Noted: 2018-11-15

## 2018-11-15 PROCEDURE — 99213 OFFICE O/P EST LOW 20 MIN: CPT

## 2018-11-15 PROCEDURE — ? COUNSELING

## 2018-11-15 ASSESSMENT — LOCATION ZONE DERM: LOCATION ZONE: FACE

## 2018-11-15 ASSESSMENT — LOCATION SIMPLE DESCRIPTION DERM
LOCATION SIMPLE: RIGHT FOREHEAD
LOCATION SIMPLE: RIGHT CHEEK

## 2018-11-15 ASSESSMENT — LOCATION DETAILED DESCRIPTION DERM
LOCATION DETAILED: RIGHT INFERIOR MEDIAL MALAR CHEEK
LOCATION DETAILED: RIGHT INFERIOR FOREHEAD
LOCATION DETAILED: RIGHT MEDIAL FOREHEAD

## 2018-11-15 NOTE — PROCEDURE: COUNSELING
Patient Specific Counseling (Will Not Stick From Patient To Patient): AKs clear on cheeks s/p Efudex.  Has had LN2 previously as well.
Detail Level: Generalized
Detail Level: Zone

## 2019-04-18 DIAGNOSIS — M25.562 ACUTE PAIN OF LEFT KNEE: ICD-10-CM

## 2019-04-18 RX ORDER — ETODOLAC 400 MG/1
400 TABLET, FILM COATED ORAL 2 TIMES DAILY
Qty: 30 TAB | Refills: 3 | Status: SHIPPED | OUTPATIENT
Start: 2019-04-18 | End: 2019-04-23 | Stop reason: CLARIF

## 2019-04-19 ENCOUNTER — TELEPHONE (OUTPATIENT)
Dept: MEDICAL GROUP | Facility: PHYSICIAN GROUP | Age: 69
End: 2019-04-19

## 2019-04-19 NOTE — TELEPHONE ENCOUNTER
1. Caller Name: [facility]                                         Call Back Number: [phone]      Patient approves a detailed voicemail message: N\A    Current medication etodolac (LODINE) 400 MG tablet is not covered by insurance.  Insurance Formulary Mobic, Voltaren, Motrin please send in a ne Rx with one of the three medications Thank you

## 2019-04-23 DIAGNOSIS — M25.569 ACUTE KNEE PAIN, UNSPECIFIED LATERALITY: ICD-10-CM

## 2019-04-23 RX ORDER — DICLOFENAC SODIUM 75 MG/1
75 TABLET, DELAYED RELEASE ORAL 2 TIMES DAILY
Qty: 30 TAB | Refills: 1 | Status: SHIPPED | OUTPATIENT
Start: 2019-04-23 | End: 2019-04-30

## 2019-04-30 DIAGNOSIS — M25.562 ACUTE PAIN OF LEFT KNEE: ICD-10-CM

## 2019-04-30 RX ORDER — MELOXICAM 7.5 MG/1
7.5 TABLET ORAL DAILY
Qty: 30 TAB | Refills: 1 | Status: SHIPPED | OUTPATIENT
Start: 2019-04-30 | End: 2019-08-13

## 2019-05-02 ENCOUNTER — HOSPITAL ENCOUNTER (OUTPATIENT)
Dept: RADIOLOGY | Facility: MEDICAL CENTER | Age: 69
End: 2019-05-02
Attending: FAMILY MEDICINE
Payer: MEDICARE

## 2019-05-02 DIAGNOSIS — M25.562 ACUTE PAIN OF LEFT KNEE: ICD-10-CM

## 2019-05-02 PROCEDURE — 73564 X-RAY EXAM KNEE 4 OR MORE: CPT | Mod: LT

## 2019-05-07 DIAGNOSIS — M25.562 ACUTE PAIN OF LEFT KNEE: ICD-10-CM

## 2019-05-27 DIAGNOSIS — M25.561 CHRONIC PAIN OF RIGHT KNEE: ICD-10-CM

## 2019-05-27 DIAGNOSIS — G89.29 CHRONIC PAIN OF RIGHT KNEE: ICD-10-CM

## 2019-05-30 ENCOUNTER — HOSPITAL ENCOUNTER (OUTPATIENT)
Dept: RADIOLOGY | Facility: MEDICAL CENTER | Age: 69
End: 2019-05-30
Attending: FAMILY MEDICINE
Payer: MEDICARE

## 2019-05-30 DIAGNOSIS — G89.29 CHRONIC PAIN OF RIGHT KNEE: ICD-10-CM

## 2019-05-30 DIAGNOSIS — M25.561 CHRONIC PAIN OF RIGHT KNEE: ICD-10-CM

## 2019-05-30 DIAGNOSIS — M25.562 ACUTE PAIN OF LEFT KNEE: ICD-10-CM

## 2019-05-30 PROCEDURE — 73721 MRI JNT OF LWR EXTRE W/O DYE: CPT | Mod: LT

## 2019-05-30 PROCEDURE — 73721 MRI JNT OF LWR EXTRE W/O DYE: CPT | Mod: RT

## 2019-06-03 DIAGNOSIS — M23.232 DERANGEMENT OF OTHER MEDIAL MENISCUS DUE TO OLD TEAR OR INJURY, LEFT KNEE: ICD-10-CM

## 2019-06-03 DIAGNOSIS — M25.761 OSTEOPHYTE, RIGHT KNEE: ICD-10-CM

## 2019-08-13 DIAGNOSIS — I83.812 VARICOSE VEINS OF LEFT LOWER EXTREMITY WITH PAIN: ICD-10-CM

## 2019-10-03 ENCOUNTER — APPOINTMENT (OUTPATIENT)
Dept: ADMISSIONS | Facility: MEDICAL CENTER | Age: 69
End: 2019-10-03
Attending: ORTHOPAEDIC SURGERY
Payer: MEDICARE

## 2019-10-03 DIAGNOSIS — Z01.810 PRE-OPERATIVE CARDIOVASCULAR EXAMINATION: ICD-10-CM

## 2019-10-03 LAB — EKG IMPRESSION: NORMAL

## 2019-10-03 RX ORDER — DOXYCYCLINE HYCLATE 100 MG/1
CAPSULE ORAL
COMMUNITY
Start: 2019-07-29 | End: 2019-10-03

## 2019-10-03 RX ORDER — DIAPER,BRIEF,YOUTH,DISPOSABLE
1 EACH MISCELLANEOUS DAILY
COMMUNITY
End: 2020-10-23

## 2019-10-03 RX ORDER — IBUPROFEN 200 MG
600 TABLET ORAL 2 TIMES DAILY
Status: ON HOLD | COMMUNITY
End: 2019-11-06

## 2019-10-03 SDOH — HEALTH STABILITY: MENTAL HEALTH: HOW OFTEN DO YOU HAVE 6 OR MORE DRINKS ON ONE OCCASION?: WEEKLY

## 2019-10-03 SDOH — HEALTH STABILITY: MENTAL HEALTH: HOW MANY STANDARD DRINKS CONTAINING ALCOHOL DO YOU HAVE ON A TYPICAL DAY?: 3 OR 4

## 2019-10-03 NOTE — OR NURSING
"Preadmit appointment: \" Preparing for your Procedure information\" sheet given to patient with verbal and written instructions. Patient instructed to continue prescribed medications through the day before surgery, instructed to take the following medications the day of surgery per anesthesia protocol:  NONE   Pt states, \"no issues with anesthesia\".  New Fasting guidelines handout given to Pt.  All Pt's questions and concerns answered.  "

## 2019-10-16 ENCOUNTER — ANESTHESIA EVENT (OUTPATIENT)
Dept: SURGERY | Facility: MEDICAL CENTER | Age: 69
End: 2019-10-16
Payer: MEDICARE

## 2019-10-16 ENCOUNTER — HOSPITAL ENCOUNTER (OUTPATIENT)
Facility: MEDICAL CENTER | Age: 69
End: 2019-10-16
Attending: ORTHOPAEDIC SURGERY | Admitting: ORTHOPAEDIC SURGERY
Payer: MEDICARE

## 2019-10-16 ENCOUNTER — ANESTHESIA (OUTPATIENT)
Dept: SURGERY | Facility: MEDICAL CENTER | Age: 69
End: 2019-10-16
Payer: MEDICARE

## 2019-10-16 VITALS
BODY MASS INDEX: 29.7 KG/M2 | DIASTOLIC BLOOD PRESSURE: 74 MMHG | OXYGEN SATURATION: 93 % | HEART RATE: 67 BPM | HEIGHT: 62 IN | SYSTOLIC BLOOD PRESSURE: 132 MMHG | WEIGHT: 161.38 LBS | TEMPERATURE: 97 F | RESPIRATION RATE: 14 BRPM

## 2019-10-16 DIAGNOSIS — S83.232A COMPLEX TEAR OF MEDIAL MENISCUS, CURRENT INJURY, LEFT KNEE, INITIAL ENCOUNTER: ICD-10-CM

## 2019-10-16 PROCEDURE — 160041 HCHG SURGERY MINUTES - EA ADDL 1 MIN LEVEL 4: Performed by: ORTHOPAEDIC SURGERY

## 2019-10-16 PROCEDURE — 160029 HCHG SURGERY MINUTES - 1ST 30 MINS LEVEL 4: Performed by: ORTHOPAEDIC SURGERY

## 2019-10-16 PROCEDURE — 700111 HCHG RX REV CODE 636 W/ 250 OVERRIDE (IP): Performed by: ANESTHESIOLOGY

## 2019-10-16 PROCEDURE — 160036 HCHG PACU - EA ADDL 30 MINS PHASE I: Performed by: ORTHOPAEDIC SURGERY

## 2019-10-16 PROCEDURE — 700111 HCHG RX REV CODE 636 W/ 250 OVERRIDE (IP)

## 2019-10-16 PROCEDURE — 160048 HCHG OR STATISTICAL LEVEL 1-5: Performed by: ORTHOPAEDIC SURGERY

## 2019-10-16 PROCEDURE — 700101 HCHG RX REV CODE 250: Performed by: ANESTHESIOLOGY

## 2019-10-16 PROCEDURE — 160002 HCHG RECOVERY MINUTES (STAT): Performed by: ORTHOPAEDIC SURGERY

## 2019-10-16 PROCEDURE — 700101 HCHG RX REV CODE 250: Performed by: ORTHOPAEDIC SURGERY

## 2019-10-16 PROCEDURE — 160035 HCHG PACU - 1ST 60 MINS PHASE I: Performed by: ORTHOPAEDIC SURGERY

## 2019-10-16 PROCEDURE — 700105 HCHG RX REV CODE 258: Performed by: ORTHOPAEDIC SURGERY

## 2019-10-16 PROCEDURE — A9270 NON-COVERED ITEM OR SERVICE: HCPCS | Performed by: ANESTHESIOLOGY

## 2019-10-16 PROCEDURE — 700101 HCHG RX REV CODE 250

## 2019-10-16 PROCEDURE — A6222 GAUZE <=16 IN NO W/SAL W/O B: HCPCS | Performed by: ORTHOPAEDIC SURGERY

## 2019-10-16 PROCEDURE — 700102 HCHG RX REV CODE 250 W/ 637 OVERRIDE(OP): Performed by: ANESTHESIOLOGY

## 2019-10-16 PROCEDURE — 700111 HCHG RX REV CODE 636 W/ 250 OVERRIDE (IP): Performed by: ORTHOPAEDIC SURGERY

## 2019-10-16 PROCEDURE — 501838 HCHG SUTURE GENERAL: Performed by: ORTHOPAEDIC SURGERY

## 2019-10-16 PROCEDURE — 160046 HCHG PACU - 1ST 60 MINS PHASE II: Performed by: ORTHOPAEDIC SURGERY

## 2019-10-16 PROCEDURE — 160025 RECOVERY II MINUTES (STATS): Performed by: ORTHOPAEDIC SURGERY

## 2019-10-16 PROCEDURE — 160009 HCHG ANES TIME/MIN: Performed by: ORTHOPAEDIC SURGERY

## 2019-10-16 RX ORDER — ONDANSETRON 2 MG/ML
4 INJECTION INTRAMUSCULAR; INTRAVENOUS
Status: DISCONTINUED | OUTPATIENT
Start: 2019-10-16 | End: 2019-10-16 | Stop reason: HOSPADM

## 2019-10-16 RX ORDER — SODIUM CHLORIDE, SODIUM LACTATE, POTASSIUM CHLORIDE, CALCIUM CHLORIDE 600; 310; 30; 20 MG/100ML; MG/100ML; MG/100ML; MG/100ML
INJECTION, SOLUTION INTRAVENOUS CONTINUOUS
Status: DISCONTINUED | OUTPATIENT
Start: 2019-10-16 | End: 2019-10-16 | Stop reason: HOSPADM

## 2019-10-16 RX ORDER — HYDROMORPHONE HYDROCHLORIDE 2 MG/ML
0.4 INJECTION, SOLUTION INTRAMUSCULAR; INTRAVENOUS; SUBCUTANEOUS
Status: DISCONTINUED | OUTPATIENT
Start: 2019-10-16 | End: 2019-10-16 | Stop reason: HOSPADM

## 2019-10-16 RX ORDER — ROPIVACAINE HYDROCHLORIDE 5 MG/ML
INJECTION, SOLUTION EPIDURAL; INFILTRATION; PERINEURAL
Status: DISCONTINUED | OUTPATIENT
Start: 2019-10-16 | End: 2019-10-16 | Stop reason: HOSPADM

## 2019-10-16 RX ORDER — MIDAZOLAM HYDROCHLORIDE 1 MG/ML
INJECTION INTRAMUSCULAR; INTRAVENOUS PRN
Status: DISCONTINUED | OUTPATIENT
Start: 2019-10-16 | End: 2019-10-16 | Stop reason: SURG

## 2019-10-16 RX ORDER — HALOPERIDOL 5 MG/ML
1 INJECTION INTRAMUSCULAR
Status: DISCONTINUED | OUTPATIENT
Start: 2019-10-16 | End: 2019-10-16 | Stop reason: HOSPADM

## 2019-10-16 RX ORDER — OXYCODONE HCL 5 MG/5 ML
5 SOLUTION, ORAL ORAL
Status: COMPLETED | OUTPATIENT
Start: 2019-10-16 | End: 2019-10-16

## 2019-10-16 RX ORDER — HYDRALAZINE HYDROCHLORIDE 20 MG/ML
INJECTION INTRAMUSCULAR; INTRAVENOUS
Status: COMPLETED
Start: 2019-10-16 | End: 2019-10-16

## 2019-10-16 RX ORDER — HYDROCODONE BITARTRATE AND ACETAMINOPHEN 5; 325 MG/1; MG/1
1 TABLET ORAL EVERY 6 HOURS PRN
Qty: 15 TAB | Refills: 0 | Status: SHIPPED | OUTPATIENT
Start: 2019-10-16 | End: 2019-10-21

## 2019-10-16 RX ORDER — LIDOCAINE HYDROCHLORIDE 10 MG/ML
INJECTION, SOLUTION INFILTRATION; PERINEURAL
Status: COMPLETED
Start: 2019-10-16 | End: 2019-10-16

## 2019-10-16 RX ORDER — HYDRALAZINE HYDROCHLORIDE 20 MG/ML
10 INJECTION INTRAMUSCULAR; INTRAVENOUS
Status: DISCONTINUED | OUTPATIENT
Start: 2019-10-16 | End: 2019-10-16 | Stop reason: HOSPADM

## 2019-10-16 RX ORDER — CEFAZOLIN SODIUM 1 G/3ML
INJECTION, POWDER, FOR SOLUTION INTRAMUSCULAR; INTRAVENOUS PRN
Status: DISCONTINUED | OUTPATIENT
Start: 2019-10-16 | End: 2019-10-16 | Stop reason: SURG

## 2019-10-16 RX ORDER — LIDOCAINE HYDROCHLORIDE 20 MG/ML
INJECTION, SOLUTION EPIDURAL; INFILTRATION; INTRACAUDAL; PERINEURAL PRN
Status: DISCONTINUED | OUTPATIENT
Start: 2019-10-16 | End: 2019-10-16 | Stop reason: SURG

## 2019-10-16 RX ORDER — HYDROMORPHONE HYDROCHLORIDE 2 MG/ML
0.2 INJECTION, SOLUTION INTRAMUSCULAR; INTRAVENOUS; SUBCUTANEOUS
Status: DISCONTINUED | OUTPATIENT
Start: 2019-10-16 | End: 2019-10-16 | Stop reason: HOSPADM

## 2019-10-16 RX ORDER — HYDROMORPHONE HYDROCHLORIDE 2 MG/ML
0.1 INJECTION, SOLUTION INTRAMUSCULAR; INTRAVENOUS; SUBCUTANEOUS
Status: DISCONTINUED | OUTPATIENT
Start: 2019-10-16 | End: 2019-10-16 | Stop reason: HOSPADM

## 2019-10-16 RX ORDER — OXYCODONE HCL 5 MG/5 ML
10 SOLUTION, ORAL ORAL
Status: COMPLETED | OUTPATIENT
Start: 2019-10-16 | End: 2019-10-16

## 2019-10-16 RX ORDER — MEPERIDINE HYDROCHLORIDE 25 MG/ML
12.5 INJECTION INTRAMUSCULAR; INTRAVENOUS; SUBCUTANEOUS
Status: DISCONTINUED | OUTPATIENT
Start: 2019-10-16 | End: 2019-10-16 | Stop reason: HOSPADM

## 2019-10-16 RX ORDER — ONDANSETRON 4 MG/1
4 TABLET, FILM COATED ORAL EVERY 4 HOURS PRN
Qty: 6 TAB | Refills: 0 | Status: ON HOLD | OUTPATIENT
Start: 2019-10-16 | End: 2019-11-06

## 2019-10-16 RX ORDER — DIPHENHYDRAMINE HYDROCHLORIDE 50 MG/ML
12.5 INJECTION INTRAMUSCULAR; INTRAVENOUS
Status: DISCONTINUED | OUTPATIENT
Start: 2019-10-16 | End: 2019-10-16 | Stop reason: HOSPADM

## 2019-10-16 RX ORDER — LABETALOL HYDROCHLORIDE 5 MG/ML
5 INJECTION, SOLUTION INTRAVENOUS
Status: DISCONTINUED | OUTPATIENT
Start: 2019-10-16 | End: 2019-10-16 | Stop reason: HOSPADM

## 2019-10-16 RX ADMIN — PROPOFOL 100 MG: 10 INJECTION, EMULSION INTRAVENOUS at 16:06

## 2019-10-16 RX ADMIN — HYDRALAZINE HYDROCHLORIDE 10 MG: 20 INJECTION INTRAMUSCULAR; INTRAVENOUS at 17:24

## 2019-10-16 RX ADMIN — FENTANYL CITRATE 50 MCG: 50 INJECTION, SOLUTION INTRAMUSCULAR; INTRAVENOUS at 16:17

## 2019-10-16 RX ADMIN — MIDAZOLAM HYDROCHLORIDE 2 MG: 1 INJECTION, SOLUTION INTRAMUSCULAR; INTRAVENOUS at 16:02

## 2019-10-16 RX ADMIN — Medication 0.5 ML: at 14:19

## 2019-10-16 RX ADMIN — LIDOCAINE HYDROCHLORIDE 40 MG: 20 INJECTION, SOLUTION EPIDURAL; INFILTRATION; INTRACAUDAL; PERINEURAL at 16:06

## 2019-10-16 RX ADMIN — FENTANYL CITRATE 25 MCG: 0.05 INJECTION, SOLUTION INTRAMUSCULAR; INTRAVENOUS at 16:58

## 2019-10-16 RX ADMIN — LIDOCAINE HYDROCHLORIDE 0.5 ML: 10 INJECTION, SOLUTION INFILTRATION; PERINEURAL at 14:19

## 2019-10-16 RX ADMIN — SODIUM CHLORIDE, POTASSIUM CHLORIDE, SODIUM LACTATE AND CALCIUM CHLORIDE: 600; 310; 30; 20 INJECTION, SOLUTION INTRAVENOUS at 14:14

## 2019-10-16 RX ADMIN — CEFAZOLIN 2 G: 1 INJECTION, POWDER, FOR SOLUTION INTRAVENOUS at 16:08

## 2019-10-16 RX ADMIN — FENTANYL CITRATE 50 MCG: 50 INJECTION, SOLUTION INTRAMUSCULAR; INTRAVENOUS at 16:26

## 2019-10-16 RX ADMIN — OXYCODONE HYDROCHLORIDE 10 MG: 5 SOLUTION ORAL at 16:56

## 2019-10-16 RX ADMIN — FENTANYL CITRATE 50 MCG: 50 INJECTION, SOLUTION INTRAMUSCULAR; INTRAVENOUS at 16:06

## 2019-10-16 ASSESSMENT — PAIN SCALES - GENERAL: PAIN_LEVEL: 6

## 2019-10-16 NOTE — ANESTHESIA QCDR
2019 [facility] Clinical Data Registry (for Quality Improvement)     Postoperative nausea/vomiting risk protocol (Adult = 18 yrs and Pediatric 3-17 yrs)- (430 and 463)  General inhalation anesthetic (NOT TIVA) with PONV risk factors: Yes  Provision of anti-emetic therapy with at least 2 different classes of agents: Yes   Patient DID NOT receive anti-emetic therapy and reason is documented in Medical Record:  N/A    Multimodal Pain Management- (AQI59)  Patient undergoing Elective Surgery (i.e. Outpatient, or ASC, or Prescheduled Surgery prior to Hospital Admission): Yes  Use of Multimodal Pain Management, two or more drugs and/or interventions, NOT including systemic opioids: Yes   Exception: Documented allergy to multiple classes of analgesics:  N/A    PACU assessment of acute postoperative pain prior to Anesthesia Care End- Applies to Patients Age = 18- (ABG7)  Initial PACU pain score is which of the following: < 7/10  Patient unable to report pain score: N/A    Post-anesthetic transfer of care checklist/protocol to PACU/ICU- (426 and 427)  Upon conclusion of case, patient transferred to which of the following locations: PACU/Non-ICU  Use of transfer checklist/protocol: Yes  Exclusion: Service Performed in Patient Hospital Room (and thus did not require transfer): N/A    PACU Reintubation- (AQI31)  General anesthesia requiring endotracheal intubation (ETT) along with subsequent extubation in OR or PACU: No  Required reintubation in the PACU: N/A  Extubation was a planned trial documented in the medical record prior to removal of the original airway device: N/A    Unplanned admission to ICU related to anesthesia service up through end of PACU care- (MD51)  Unplanned admission to ICU (not initially anticipated at anesthesia start time): No

## 2019-10-16 NOTE — ANESTHESIA PREPROCEDURE EVALUATION
Relevant Problems   CARDIAC   (+) Aortic stenosis, mild       Physical Exam    Airway   Mallampati: II  TM distance: >3 FB  Neck ROM: full       Cardiovascular - normal exam  Rhythm: regular  Rate: normal  (-) murmur     Dental - normal exam           Pulmonary - normal exam  Breath sounds clear to auscultation     Abdominal    Neurological - normal exam                 Anesthesia Plan    ASA 2       Plan - general       Airway plan will be LMA        Induction: intravenous    Postoperative Plan: Postoperative administration of opioids is intended.    Pertinent diagnostic labs and testing reviewed    Informed Consent:    Anesthetic plan and risks discussed with patient.    Use of blood products discussed with: patient whom consented to blood products.

## 2019-10-16 NOTE — ANESTHESIA POSTPROCEDURE EVALUATION
Patient: Regina Cornell    Procedure Summary     Date:  10/16/19 Room / Location:   OR 06 / SURGERY [facility]    Anesthesia Start:  1602 Anesthesia Stop:  1640    Procedures:       ARTHROSCOPY, KNEE (Left Knee)      MENISCECTOMY, KNEE, MEDIAL- PARTIAL (Left Knee)      CHONDROPLASTY- RAMIREZ (Left Knee) Diagnosis:  (ACUTE TEAR MEDIAL MENISCUS, PAIN IN LEFT KNEE)    Surgeon:  Bobo Booth M.D. Responsible Provider:  Paul Panda M.D.    Anesthesia Type:  general ASA Status:  2          Final Anesthesia Type: general  Last vitals  BP   Blood Pressure : (!) 176/92    Temp   36.2 °C (97.2 °F)    Pulse   Pulse: (!) 55, Heart Rate (Monitored): 67   Resp   12    SpO2   98 %      Anesthesia Post Evaluation    Patient location during evaluation: PACU  Patient participation: complete - patient participated  Level of consciousness: awake and alert  Pain score: 6    Airway patency: patent  Anesthetic complications: no  Cardiovascular status: hemodynamically stable  Respiratory status: acceptable  Hydration status: euvolemic    PONV: none           Nurse Pain Score: 8 (NPRS)

## 2019-10-16 NOTE — ANESTHESIA TIME REPORT
Anesthesia Start and Stop Event Times     Date Time Event    10/16/2019 1556 Ready for Procedure     1602 Anesthesia Start     1640 Anesthesia Stop        Responsible Staff  10/16/19    Name Role Begin End    Paul Panda M.D. Anesth 1602 1640        Preop Diagnosis (Free Text):  Pre-op Diagnosis     ACUTE TEAR MEDIAL MENISCUS, PAIN IN LEFT KNEE        Preop Diagnosis (Codes):    Post op Diagnosis  Tear of medial meniscus of left knee  Left knee pain    Premium Reason  A. 3PM - 7AM    Comments:

## 2019-10-16 NOTE — ANESTHESIA PROCEDURE NOTES
Airway  Date/Time: 10/16/2019 4:07 PM  Performed by: Paul Panda M.D.  Authorized by: Paul Panda M.D.     Location:  OR  Urgency:  Elective  Difficult Airway: No    Indications for Airway Management:  Anesthesia  Spontaneous Ventilation: absent    Sedation Level:  Deep  Preoxygenated: Yes    Mask Difficulty Assessment:  0 - not attempted  Final Airway Type:  Supraglottic airway  Final Supraglottic Airway:  Standard LMA  SGA Size:  4  Number of Attempts at Approach:  1

## 2019-10-16 NOTE — ANESTHESIA POSTPROCEDURE EVALUATION
Patient: Regina Cornell    Procedure Summary     Date:  10/16/19 Room / Location:   OR 06 / SURGERY [facility]    Anesthesia Start:  1602 Anesthesia Stop:  1640    Procedures:       ARTHROSCOPY, KNEE (Left Knee)      MENISCECTOMY, KNEE, MEDIAL- PARTIAL (Left Knee)      CHONDROPLASTY- RAMIREZ (Left Knee) Diagnosis:  (ACUTE TEAR MEDIAL MENISCUS, PAIN IN LEFT KNEE)    Surgeon:  Bobo Booth M.D. Responsible Provider:  Paul Panda M.D.    Anesthesia Type:  general ASA Status:  2          Final Anesthesia Type: general  Last vitals  BP   Blood Pressure : (!) 176/92    Temp   36.2 °C (97.2 °F)    Pulse   Pulse: (!) 55, Heart Rate (Monitored): 67   Resp   12    SpO2   98 %      Anesthesia Post Evaluation    Patient location during evaluation: PACU  Patient participation: complete - patient participated  Level of consciousness: awake and alert  Pain score: 6    Airway patency: patent  Anesthetic complications: no  Cardiovascular status: hemodynamically stable  Respiratory status: acceptable  Hydration status: euvolemic    PONV: none           Nurse Pain Score: 8 (NPRS)

## 2019-10-16 NOTE — OR NURSING
1634: To PACU post left knee arthroscopy. OPA in place. Strong DP pulse observed.  1639: OPA dc'd, breathing is spontaneous and unlabored.  1650: Pt states pain is 7-8/10, see MAR.  1720: Pt sleeping. BP elevated, see MAR.  1727: Report given to BARB Alba RN.

## 2019-10-17 NOTE — DISCHARGE INSTRUCTIONS
ACTIVITY: Rest and take it easy for the first 24 hours.  A responsible adult is recommended to remain with you during that time.  It is normal to feel sleepy.  We encourage you to not do anything that requires balance, judgment or coordination.    MILD FLU-LIKE SYMPTOMS ARE NORMAL. YOU MAY EXPERIENCE GENERALIZED MUSCLE ACHES, THROAT IRRITATION, HEADACHE AND/OR SOME NAUSEA.    FOR 24 HOURS DO NOT:  Drive, operate machinery or run household appliances.  Drink beer or alcoholic beverages.   Make important decisions or sign legal documents.    SPECIAL INSTRUCTIONS: Activity is to be weight bearing as tolerated. Ice and elevate.    DIET: To avoid nausea, slowly advance diet as tolerated, avoiding spicy or greasy foods for the first day.  Add more substantial food to your diet according to your physician's instructions.   INCREASE FLUIDS AND FIBER TO AVOID CONSTIPATION.    SURGICAL DRESSING/BATHING: Keep dressings clean and dry. May remove dressings Post op Day #2 and Shower with wound uncovered.  Apply bandaids after shower.  Do not soak or submerge incisions for two weeks. Ice and elevate extremity.    FOLLOW-UP APPOINTMENT:  A follow-up appointment should be arranged with your doctor in 7-10 days; call to schedule.    You should CALL YOUR PHYSICIAN if you develop:  Fever greater than 101 degrees F.  Pain not relieved by medication, or persistent nausea or vomiting.  Excessive bleeding (blood soaking through dressing) or unexpected drainage from the wound.  Extreme redness or swelling around the incision site, drainage of pus or foul smelling drainage.  Inability to urinate or empty your bladder within 8 hours.    You should call 911 if you develop problems with breathing or chest pain.    If you are unable to contact your doctor or surgical center, you should go to the nearest emergency room or urgent care center.      Physician's telephone #: Dr. Booth [phone]    If any questions arise, call your doctor.  If  your doctor is not available, please feel free to call the Surgical Center at [phone].  The Center is open Monday through Friday from 7AM to 7PM.      You can also call the HEALTH HOTLINE open 24 hours/day, 7 days/week and speak to a nurse at [phone], or toll free at [phone].    A registered nurse may call you a few days after your surgery to see how you are doing after your procedure.    MEDICATIONS: Resume taking daily medication.  Take prescribed pain medication with food.  If no medication is prescribed, you may take non-aspirin pain medication if needed.  PAIN MEDICATION CAN BE VERY CONSTIPATING.  Take a stool softener or laxative such as senokot, pericolace, or milk of magnesia if needed.    Prescription given for prior to surgery.       Last pain medication given at 04:56 PM. Next dose due at 9:00 PM.    If your physician has prescribed pain medication that includes Acetaminophen (Tylenol), do not take additional Acetaminophen (Tylenol) while taking the prescribed medication.    Depression / Suicide Risk    As you are discharged from this [facility] facility, it is important to learn how to keep safe from harming yourself.    Recognize the warning signs:  · Abrupt changes in personality, positive or negative- including increase in energy   · Giving away possessions  · Change in eating patterns- significant weight changes-  positive or negative  · Change in sleeping patterns- unable to sleep or sleeping all the time   · Unwillingness or inability to communicate  · Depression  · Unusual sadness, discouragement and loneliness  · Talk of wanting to die  · Neglect of personal appearance   · Rebelliousness- reckless behavior  · Withdrawal from people/activities they love  · Confusion- inability to concentrate     If you or a loved one observes any of these behaviors or has concerns about self-harm, here's what you can do:  · Talk about it- your feelings and reasons for harming  yourself  · Remove any means that you might use to hurt yourself (examples: pills, rope, extension cords, firearm)  · Get professional help from the community (Mental Health, Substance Abuse, psychological counseling)  · Do not be alone:Call your Safe Contact- someone whom you trust who will be there for you.  · Call your local CRISIS HOTLINE 037-0046 or 026-015-8089  · Call your local Children's Mobile Crisis Response Team [place] (011) 617-4741 or www.[employer]  · Call the toll free National Suicide Prevention Hotlines   · National Suicide Prevention Lifeline 450-632-JOCI (7784)  · National Hope Line Network 800-SUICIDE (219-6385)

## 2019-10-17 NOTE — OP REPORT
DATE OF SERVICE:  10/16/2019    SURGEON:  Bobo Booth MD    ASSISTANT:  Guilherme Montana PA-C    PREOPERATIVE DIAGNOSES:  1.  Left knee medial meniscus tear.  2.  Left knee chondromalacia.    POSTOPERATIVE DIAGNOSES:  1.  Left knee medial meniscus tear.  2.  Left knee chondromalacia.    PROCEDURE:  Left knee arthroscopy with partial medial meniscectomy and   chondroplasty.    ANESTHESIOLOGIST:  Paul Panda MD    ANESTHETIC:  LMA anesthesia along with local injection.    INDICATIONS:  Patient has had left knee pain for quite some time, failed   nonoperative treatment, elected to proceed with operative intervention.  She   was explained the risks, benefits, alternative procedure and recovery in   detail.  All questions were answered.  Informed consent was obtained.  Site   verification per protocol was done in the preoperative holding area in the   operating room.  Patient received appropriate preoperative antibiotics.    DESCRIPTION OF OPERATION:  After successful anesthesia, patient's left knee   was examined.  She had good range of motion, no evidence of instability.  Leg   was then prepped and draped in the usual sterile fashion.  Anterolateral   portal was established with knife and blunt trocar in the suprapatellar pouch.    Suprapatellar pouch, medial and lateral gutters were free of abnormalities.    The patella and trochlea had some minor chondromalacia, gently smoothed out   with a shaver.  Grade II.  This was then probed and felt to be stable and   otherwise tracked well.  The medial joint had a complex tear of the posterior   horn of the medial meniscus.  This was debrided back with jensen and baskets.    The undersurface was turned into a horizontal cleavage.  It was flipped back   leaving as much that superior surface as possible.  There was a displaced   flap underneath that was pulled out and debrided.  This was then probed and   felt to be stable.  This was done with jensen and baskets  from multiple   portals.  She had a grade II lesion on the femoral condyle with some   delaminated soft cartilage that was about 15x15.  This was smoothed out and   bevelled.  The notch showed normal ACL and PCL.  Lateral joint had normal   articular cartilage and meniscus to visual inspection and probing.  At that   point, I was happy with the procedure.  I lavaged out the joint, suctioned out   the fluid, closed the portals with 3-0 Prolene in interrupted fashion.    Xeroform, 4x4s, Ace wrap was applied.    ESTIMATED BLOOD LOSS:  Minimal.    COMPLICATIONS:  None.    Guilherme Montana PA-C, was medically necessary for the case.  He helped with   instrumentation, retraction and positioning.  Without his help, the case would   not have been as technically successful.       ____________________________________     MD LEVI POWELLU / JEOVANY    DD:  10/16/2019 16:37:38  DT:  10/16/2019 21:11:50    D#:  [id number]  Job#:  [id number]

## 2019-10-17 NOTE — OR NURSING
1743 Patient to stage 2. Up and dressed. Vital signs taken.  at chairside.  1758 Patient sipping on water.   1811 Patient and family given discharge instructions. Verbalizes understanding. No further questions or concens.

## 2019-10-17 NOTE — OR NURSING
1728 Report from Montrell GAN to assume care of patient.   1736 Patient resting comfortably. States pain has decreased.  1743 Patient meets criteria for stage 2.

## 2019-11-05 ENCOUNTER — HOSPITAL ENCOUNTER (OUTPATIENT)
Facility: MEDICAL CENTER | Age: 69
End: 2019-11-06
Attending: EMERGENCY MEDICINE | Admitting: INTERNAL MEDICINE
Payer: MEDICARE

## 2019-11-05 ENCOUNTER — OFFICE VISIT (OUTPATIENT)
Dept: URGENT CARE | Facility: PHYSICIAN GROUP | Age: 69
End: 2019-11-05
Payer: MEDICARE

## 2019-11-05 ENCOUNTER — APPOINTMENT (OUTPATIENT)
Dept: RADIOLOGY | Facility: MEDICAL CENTER | Age: 69
End: 2019-11-05
Attending: EMERGENCY MEDICINE
Payer: MEDICARE

## 2019-11-05 VITALS
HEART RATE: 74 BPM | BODY MASS INDEX: 27.6 KG/M2 | WEIGHT: 150 LBS | OXYGEN SATURATION: 98 % | HEIGHT: 62 IN | TEMPERATURE: 97 F | RESPIRATION RATE: 16 BRPM | DIASTOLIC BLOOD PRESSURE: 80 MMHG | SYSTOLIC BLOOD PRESSURE: 138 MMHG

## 2019-11-05 DIAGNOSIS — Z86.718 HISTORY OF DVT (DEEP VEIN THROMBOSIS): ICD-10-CM

## 2019-11-05 DIAGNOSIS — R07.9 CHEST PAIN, UNSPECIFIED TYPE: ICD-10-CM

## 2019-11-05 DIAGNOSIS — I26.99 ACUTE PULMONARY EMBOLISM, UNSPECIFIED PULMONARY EMBOLISM TYPE, UNSPECIFIED WHETHER ACUTE COR PULMONALE PRESENT (HCC): ICD-10-CM

## 2019-11-05 PROBLEM — I82.409 DVT (DEEP VENOUS THROMBOSIS) (HCC): Status: ACTIVE | Noted: 2019-11-05

## 2019-11-05 PROBLEM — Z96.652 HX OF TOTAL KNEE ARTHROPLASTY, LEFT: Status: ACTIVE | Noted: 2019-11-05

## 2019-11-05 LAB
ALBUMIN SERPL BCP-MCNC: 4.3 G/DL (ref 3.2–4.9)
ALBUMIN/GLOB SERPL: 1.4 G/DL
ALP SERPL-CCNC: 80 U/L (ref 30–99)
ALT SERPL-CCNC: 22 U/L (ref 2–50)
ANION GAP SERPL CALC-SCNC: 14 MMOL/L (ref 0–11.9)
AST SERPL-CCNC: 18 U/L (ref 12–45)
BASOPHILS # BLD AUTO: 0.6 % (ref 0–1.8)
BASOPHILS # BLD: 0.05 K/UL (ref 0–0.12)
BILIRUB SERPL-MCNC: 0.4 MG/DL (ref 0.1–1.5)
BUN SERPL-MCNC: 14 MG/DL (ref 8–22)
CALCIUM SERPL-MCNC: 9.9 MG/DL (ref 8.4–10.2)
CHLORIDE SERPL-SCNC: 100 MMOL/L (ref 96–112)
CO2 SERPL-SCNC: 23 MMOL/L (ref 20–33)
CREAT SERPL-MCNC: 0.63 MG/DL (ref 0.5–1.4)
EKG IMPRESSION: NORMAL
EOSINOPHIL # BLD AUTO: 0.13 K/UL (ref 0–0.51)
EOSINOPHIL NFR BLD: 1.5 % (ref 0–6.9)
ERYTHROCYTE [DISTWIDTH] IN BLOOD BY AUTOMATED COUNT: 44.7 FL (ref 35.9–50)
GLOBULIN SER CALC-MCNC: 3 G/DL (ref 1.9–3.5)
GLUCOSE SERPL-MCNC: 91 MG/DL (ref 65–99)
HCT VFR BLD AUTO: 45.1 % (ref 37–47)
HGB BLD-MCNC: 14.8 G/DL (ref 12–16)
IMM GRANULOCYTES # BLD AUTO: 0.02 K/UL (ref 0–0.11)
IMM GRANULOCYTES NFR BLD AUTO: 0.2 % (ref 0–0.9)
INR PPP: 0.97 (ref 0.87–1.13)
LYMPHOCYTES # BLD AUTO: 2.58 K/UL (ref 1–4.8)
LYMPHOCYTES NFR BLD: 30.7 % (ref 22–41)
MCH RBC QN AUTO: 30.8 PG (ref 27–33)
MCHC RBC AUTO-ENTMCNC: 32.8 G/DL (ref 33.6–35)
MCV RBC AUTO: 93.8 FL (ref 81.4–97.8)
MONOCYTES # BLD AUTO: 0.67 K/UL (ref 0–0.85)
MONOCYTES NFR BLD AUTO: 8 % (ref 0–13.4)
NEUTROPHILS # BLD AUTO: 4.96 K/UL (ref 2–7.15)
NEUTROPHILS NFR BLD: 59 % (ref 44–72)
NRBC # BLD AUTO: 0 K/UL
NRBC BLD-RTO: 0 /100 WBC
PLATELET # BLD AUTO: 314 K/UL (ref 164–446)
PMV BLD AUTO: 9.3 FL (ref 9–12.9)
POTASSIUM SERPL-SCNC: 3.8 MMOL/L (ref 3.6–5.5)
PROT SERPL-MCNC: 7.3 G/DL (ref 6–8.2)
PROTHROMBIN TIME: 13 SEC (ref 12–14.6)
RBC # BLD AUTO: 4.81 M/UL (ref 4.2–5.4)
SODIUM SERPL-SCNC: 137 MMOL/L (ref 135–145)
TROPONIN T SERPL-MCNC: <6 NG/L (ref 6–19)
WBC # BLD AUTO: 8.4 K/UL (ref 4.8–10.8)

## 2019-11-05 PROCEDURE — 96372 THER/PROPH/DIAG INJ SC/IM: CPT

## 2019-11-05 PROCEDURE — 71275 CT ANGIOGRAPHY CHEST: CPT

## 2019-11-05 PROCEDURE — 93000 ELECTROCARDIOGRAM COMPLETE: CPT | Performed by: PHYSICIAN ASSISTANT

## 2019-11-05 PROCEDURE — 99220 PR INITIAL OBSERVATION CARE,LEVL III: CPT | Performed by: INTERNAL MEDICINE

## 2019-11-05 PROCEDURE — 93005 ELECTROCARDIOGRAM TRACING: CPT | Performed by: EMERGENCY MEDICINE

## 2019-11-05 PROCEDURE — 80053 COMPREHEN METABOLIC PANEL: CPT

## 2019-11-05 PROCEDURE — 99214 OFFICE O/P EST MOD 30 MIN: CPT | Performed by: PHYSICIAN ASSISTANT

## 2019-11-05 PROCEDURE — 700111 HCHG RX REV CODE 636 W/ 250 OVERRIDE (IP): Performed by: EMERGENCY MEDICINE

## 2019-11-05 PROCEDURE — 96374 THER/PROPH/DIAG INJ IV PUSH: CPT

## 2019-11-05 PROCEDURE — 85025 COMPLETE CBC W/AUTO DIFF WBC: CPT

## 2019-11-05 PROCEDURE — 700102 HCHG RX REV CODE 250 W/ 637 OVERRIDE(OP): Performed by: INTERNAL MEDICINE

## 2019-11-05 PROCEDURE — 99285 EMERGENCY DEPT VISIT HI MDM: CPT

## 2019-11-05 PROCEDURE — G0378 HOSPITAL OBSERVATION PER HR: HCPCS

## 2019-11-05 PROCEDURE — A9270 NON-COVERED ITEM OR SERVICE: HCPCS | Performed by: INTERNAL MEDICINE

## 2019-11-05 PROCEDURE — 700117 HCHG RX CONTRAST REV CODE 255: Performed by: EMERGENCY MEDICINE

## 2019-11-05 PROCEDURE — 96375 TX/PRO/DX INJ NEW DRUG ADDON: CPT

## 2019-11-05 PROCEDURE — 84484 ASSAY OF TROPONIN QUANT: CPT

## 2019-11-05 PROCEDURE — 93005 ELECTROCARDIOGRAM TRACING: CPT

## 2019-11-05 PROCEDURE — 85610 PROTHROMBIN TIME: CPT

## 2019-11-05 RX ORDER — AMOXICILLIN 250 MG
2 CAPSULE ORAL 2 TIMES DAILY
Status: DISCONTINUED | OUTPATIENT
Start: 2019-11-05 | End: 2019-11-06 | Stop reason: HOSPADM

## 2019-11-05 RX ORDER — POLYETHYLENE GLYCOL 3350 17 G/17G
1 POWDER, FOR SOLUTION ORAL
Status: DISCONTINUED | OUTPATIENT
Start: 2019-11-05 | End: 2019-11-06 | Stop reason: HOSPADM

## 2019-11-05 RX ORDER — BISACODYL 10 MG
10 SUPPOSITORY, RECTAL RECTAL
Status: DISCONTINUED | OUTPATIENT
Start: 2019-11-05 | End: 2019-11-06 | Stop reason: HOSPADM

## 2019-11-05 RX ORDER — WARFARIN SODIUM 5 MG/1
5 TABLET ORAL DAILY
Status: DISCONTINUED | OUTPATIENT
Start: 2019-11-06 | End: 2019-11-06 | Stop reason: HOSPADM

## 2019-11-05 RX ORDER — ONDANSETRON 2 MG/ML
4 INJECTION INTRAMUSCULAR; INTRAVENOUS EVERY 4 HOURS PRN
Status: DISCONTINUED | OUTPATIENT
Start: 2019-11-05 | End: 2019-11-06 | Stop reason: HOSPADM

## 2019-11-05 RX ORDER — WARFARIN SODIUM 7.5 MG/1
7.5 TABLET ORAL DAILY
Status: COMPLETED | OUTPATIENT
Start: 2019-11-05 | End: 2019-11-05

## 2019-11-05 RX ORDER — METHYLPREDNISOLONE SODIUM SUCCINATE 125 MG/2ML
125 INJECTION, POWDER, LYOPHILIZED, FOR SOLUTION INTRAMUSCULAR; INTRAVENOUS ONCE
Status: COMPLETED | OUTPATIENT
Start: 2019-11-05 | End: 2019-11-05

## 2019-11-05 RX ORDER — DIPHENHYDRAMINE HYDROCHLORIDE 50 MG/ML
50 INJECTION INTRAMUSCULAR; INTRAVENOUS ONCE
Status: COMPLETED | OUTPATIENT
Start: 2019-11-05 | End: 2019-11-05

## 2019-11-05 RX ORDER — ONDANSETRON 4 MG/1
4 TABLET, ORALLY DISINTEGRATING ORAL EVERY 4 HOURS PRN
Status: DISCONTINUED | OUTPATIENT
Start: 2019-11-05 | End: 2019-11-06 | Stop reason: HOSPADM

## 2019-11-05 RX ORDER — TRAZODONE HYDROCHLORIDE 50 MG/1
50 TABLET ORAL NIGHTLY PRN
Status: DISCONTINUED | OUTPATIENT
Start: 2019-11-05 | End: 2019-11-06 | Stop reason: HOSPADM

## 2019-11-05 RX ORDER — ACETAMINOPHEN 325 MG/1
650 TABLET ORAL EVERY 6 HOURS PRN
Status: DISCONTINUED | OUTPATIENT
Start: 2019-11-05 | End: 2019-11-06 | Stop reason: HOSPADM

## 2019-11-05 RX ADMIN — ENOXAPARIN SODIUM 80 MG: 100 INJECTION SUBCUTANEOUS at 20:54

## 2019-11-05 RX ADMIN — METHYLPREDNISOLONE SODIUM SUCCINATE 125 MG: 125 INJECTION, POWDER, FOR SOLUTION INTRAMUSCULAR; INTRAVENOUS at 18:40

## 2019-11-05 RX ADMIN — WARFARIN SODIUM 7.5 MG: 7.5 TABLET ORAL at 22:51

## 2019-11-05 RX ADMIN — IOHEXOL 75 ML: 350 INJECTION, SOLUTION INTRAVENOUS at 19:47

## 2019-11-05 RX ADMIN — DIPHENHYDRAMINE HYDROCHLORIDE 50 MG: 50 INJECTION INTRAMUSCULAR; INTRAVENOUS at 18:40

## 2019-11-05 ASSESSMENT — COGNITIVE AND FUNCTIONAL STATUS - GENERAL
DAILY ACTIVITIY SCORE: 24
MOBILITY SCORE: 24
SUGGESTED CMS G CODE MODIFIER DAILY ACTIVITY: CH
SUGGESTED CMS G CODE MODIFIER MOBILITY: CH

## 2019-11-05 ASSESSMENT — ENCOUNTER SYMPTOMS
HEADACHES: 0
DIZZINESS: 0
SORE THROAT: 0
DIAPHORESIS: 0
IRREGULAR HEARTBEAT: 0
WHEEZING: 0
CONSTIPATION: 0
DIZZINESS: 0
VOMITING: 0
LOSS OF CONSCIOUSNESS: 0
PALPITATIONS: 0
ABDOMINAL PAIN: 0
HEADACHES: 0
PND: 0
SHORTNESS OF BREATH: 0
FEVER: 0
SPUTUM PRODUCTION: 0
NAUSEA: 0
DIAPHORESIS: 0
BLOOD IN STOOL: 0
EYE REDNESS: 0
WEAKNESS: 0
BRUISES/BLEEDS EASILY: 1
CHILLS: 0
DIARRHEA: 0
BLURRED VISION: 0
COUGH: 0
ABDOMINAL PAIN: 0
NERVOUS/ANXIOUS: 0
PALPITATIONS: 1
FEVER: 0
NAUSEA: 0
CHILLS: 0
BACK PAIN: 0
HEMOPTYSIS: 0
VOMITING: 0
EYE DISCHARGE: 0
EYE PAIN: 0
WEIGHT LOSS: 0
LOWER EXTREMITY EDEMA: 0
COUGH: 1
ORTHOPNEA: 0
SHORTNESS OF BREATH: 1

## 2019-11-05 ASSESSMENT — LIFESTYLE VARIABLES
HAVE YOU EVER FELT YOU SHOULD CUT DOWN ON YOUR DRINKING: NO
TOTAL SCORE: 0
CONSUMPTION TOTAL: NEGATIVE
ALCOHOL_USE: NO
HOW MANY TIMES IN THE PAST YEAR HAVE YOU HAD 5 OR MORE DRINKS IN A DAY: 0
EVER HAD A DRINK FIRST THING IN THE MORNING TO STEADY YOUR NERVES TO GET RID OF A HANGOVER: NO
AVERAGE NUMBER OF DAYS PER WEEK YOU HAVE A DRINK CONTAINING ALCOHOL: 1
EVER FELT BAD OR GUILTY ABOUT YOUR DRINKING: NO
EVER_SMOKED: YES
TOTAL SCORE: 0
ON A TYPICAL DAY WHEN YOU DRINK ALCOHOL HOW MANY DRINKS DO YOU HAVE: 1
TOTAL SCORE: 0
HAVE PEOPLE ANNOYED YOU BY CRITICIZING YOUR DRINKING: NO

## 2019-11-05 ASSESSMENT — PATIENT HEALTH QUESTIONNAIRE - PHQ9
2. FEELING DOWN, DEPRESSED, IRRITABLE, OR HOPELESS: NOT AT ALL
1. LITTLE INTEREST OR PLEASURE IN DOING THINGS: NOT AT ALL
SUM OF ALL RESPONSES TO PHQ9 QUESTIONS 1 AND 2: 0

## 2019-11-06 ENCOUNTER — PATIENT OUTREACH (OUTPATIENT)
Dept: HEALTH INFORMATION MANAGEMENT | Facility: OTHER | Age: 69
End: 2019-11-06

## 2019-11-06 ENCOUNTER — APPOINTMENT (OUTPATIENT)
Dept: RADIOLOGY | Facility: MEDICAL CENTER | Age: 69
End: 2019-11-06
Attending: INTERNAL MEDICINE
Payer: MEDICARE

## 2019-11-06 ENCOUNTER — APPOINTMENT (OUTPATIENT)
Dept: CARDIOLOGY | Facility: MEDICAL CENTER | Age: 69
End: 2019-11-06
Attending: INTERNAL MEDICINE
Payer: MEDICARE

## 2019-11-06 VITALS
DIASTOLIC BLOOD PRESSURE: 64 MMHG | HEIGHT: 62 IN | OXYGEN SATURATION: 93 % | WEIGHT: 158.95 LBS | HEART RATE: 63 BPM | BODY MASS INDEX: 29.25 KG/M2 | TEMPERATURE: 97.9 F | RESPIRATION RATE: 18 BRPM | SYSTOLIC BLOOD PRESSURE: 116 MMHG

## 2019-11-06 LAB
INR PPP: 0.98 (ref 0.87–1.13)
LV EJECT FRACT  99904: 60
LV EJECT FRACT MOD 2C 99903: 66.84
LV EJECT FRACT MOD 4C 99902: 54.71
LV EJECT FRACT MOD BP 99901: 61.27
PROTHROMBIN TIME: 13.1 SEC (ref 12–14.6)

## 2019-11-06 PROCEDURE — 90662 IIV NO PRSV INCREASED AG IM: CPT | Performed by: INTERNAL MEDICINE

## 2019-11-06 PROCEDURE — 96372 THER/PROPH/DIAG INJ SC/IM: CPT

## 2019-11-06 PROCEDURE — 700111 HCHG RX REV CODE 636 W/ 250 OVERRIDE (IP): Performed by: INTERNAL MEDICINE

## 2019-11-06 PROCEDURE — G0378 HOSPITAL OBSERVATION PER HR: HCPCS

## 2019-11-06 PROCEDURE — 93306 TTE W/DOPPLER COMPLETE: CPT | Mod: 26 | Performed by: INTERNAL MEDICINE

## 2019-11-06 PROCEDURE — 85610 PROTHROMBIN TIME: CPT

## 2019-11-06 PROCEDURE — 99217 PR OBSERVATION CARE DISCHARGE: CPT | Performed by: INTERNAL MEDICINE

## 2019-11-06 PROCEDURE — 90471 IMMUNIZATION ADMIN: CPT

## 2019-11-06 PROCEDURE — 93306 TTE W/DOPPLER COMPLETE: CPT

## 2019-11-06 PROCEDURE — 93971 EXTREMITY STUDY: CPT | Mod: LT

## 2019-11-06 PROCEDURE — 93971 EXTREMITY STUDY: CPT | Mod: 26 | Performed by: INTERNAL MEDICINE

## 2019-11-06 RX ORDER — WARFARIN SODIUM 5 MG/1
5 TABLET ORAL DAILY
Qty: 30 TAB | Refills: 3 | Status: SHIPPED | OUTPATIENT
Start: 2019-11-06 | End: 2019-11-15 | Stop reason: SDUPTHER

## 2019-11-06 RX ORDER — WARFARIN SODIUM 5 MG/1
5 TABLET ORAL DAILY
Qty: 30 TAB | Refills: 3 | Status: SHIPPED | OUTPATIENT
Start: 2019-11-06 | End: 2019-11-06

## 2019-11-06 RX ORDER — ACETAMINOPHEN 500 MG
1000 TABLET ORAL EVERY 6 HOURS PRN
Status: ON HOLD | COMMUNITY
End: 2019-11-06

## 2019-11-06 RX ORDER — ACETAMINOPHEN 325 MG/1
650 TABLET ORAL EVERY 6 HOURS PRN
Qty: 30 TAB | Refills: 0 | Status: SHIPPED | OUTPATIENT
Start: 2019-11-06 | End: 2019-11-14

## 2019-11-06 RX ORDER — MELATONIN
2000 DAILY
COMMUNITY
End: 2020-10-23

## 2019-11-06 RX ADMIN — INFLUENZA A VIRUS A/MICHIGAN/45/2015 X-275 (H1N1) ANTIGEN (FORMALDEHYDE INACTIVATED), INFLUENZA A VIRUS A/SINGAPORE/INFIMH-16-0019/2016 IVR-186 (H3N2) ANTIGEN (FORMALDEHYDE INACTIVATED), AND INFLUENZA B VIRUS B/MARYLAND/15/2016 BX-69A (A B/COLORADO/6/2017-LIKE VIRUS) ANTIGEN (FORMALDEHYDE INACTIVATED) 0.5 ML: 60; 60; 60 INJECTION, SUSPENSION INTRAMUSCULAR at 14:18

## 2019-11-06 RX ADMIN — ENOXAPARIN SODIUM 80 MG: 100 INJECTION SUBCUTANEOUS at 08:56

## 2019-11-06 NOTE — PROGRESS NOTES
Telemetry Shift Summary    Rhythm SR  HR Range 60-80's  Ectopy no ectopy  Measurements 0.18/0.08/0.40        Normal Values  Rhythm SR  HR Range    Measurements 0.12-0.20 / 0.06-0.10  / 0.30-0.52

## 2019-11-06 NOTE — ED PROVIDER NOTES
CHIEF COMPLAINT  Chief Complaint   Patient presents with   • Sent from Urgent Care     Went to  for chest heaviness and pounding heart.  DVT on the .       HPI  Regina Cornell is a 68 y.o. female who presents to emergency room today with chest pain.  Patient states this started  was worse today feels like somebody sitting on her chest difficulty breathing.  Recently diagnosed with DVT to her left leg following arthroscopic surgery performed patient diagnosed on 10/24 currently on Eliquis.  No nausea no vomiting no fever chills no change to bowel bladder pain is more up with pressure middle chest wall rating to both sides.    REVIEW OF SYSTEMS  See HPI for further details. All other systems are negative.     PAST MEDICAL HISTORY  Past Medical History:   Diagnosis Date   • Arthritis     knees   • Pain     knees       FAMILY HISTORY  [unfilled]    SOCIAL HISTORY  Social History     Socioeconomic History   • Marital status:      Spouse name: Not on file   • Number of children: Not on file   • Years of education: Not on file   • Highest education level: Not on file   Occupational History   • Not on file   Social Needs   • Financial resource strain: Not on file   • Food insecurity:     Worry: Not on file     Inability: Not on file   • Transportation needs:     Medical: Not on file     Non-medical: Not on file   Tobacco Use   • Smoking status: Former Smoker     Types: Cigarettes     Last attempt to quit: 1977     Years since quittin.8   • Smokeless tobacco: Never Used   Substance and Sexual Activity   • Alcohol use: Yes     Drinks per session: 3 or 4     Binge frequency: Weekly     Comment: occasional   • Drug use: No   • Sexual activity: Yes     Partners: Male     Birth control/protection: Surgical     Comment: patient has had a hysterectomy for ovarian cysts.   Lifestyle   • Physical activity:     Days per week: Not on file     Minutes per session: Not on file   • Stress: Not on file    Relationships   • Social connections:     Talks on phone: Not on file     Gets together: Not on file     Attends Gnosticism service: Not on file     Active member of club or organization: Not on file     Attends meetings of clubs or organizations: Not on file     Relationship status: Not on file   • Intimate partner violence:     Fear of current or ex partner: Not on file     Emotionally abused: Not on file     Physically abused: Not on file     Forced sexual activity: Not on file   Other Topics Concern   • Not on file   Social History Narrative   • Not on file       SURGICAL HISTORY  Past Surgical History:   Procedure Laterality Date   • KNEE ARTHROSCOPY Left 10/16/2019    Procedure: ARTHROSCOPY, KNEE;  Surgeon: Bobo Booth M.D.;  Location: SURGERY [facility];  Service: Orthopedics   • MEDIAL MENISCECTOMY Left 10/16/2019    Procedure: MENISCECTOMY, KNEE, MEDIAL- PARTIAL;  Surgeon: Bobo Booth M.D.;  Location: SURGERY [facility];  Service: Orthopedics   • CHONDROPLASTY Left 10/16/2019    Procedure: CHONDROPLASTY- RAMIREZ;  Surgeon: Bobo Booth M.D.;  Location: SURGERY [facility];  Service: Orthopedics   • ACL RECONSTRUCTION  2002    With patella tendon for repair   • ABDOMINAL HYSTERECTOMY TOTAL  1989    Precancerous Changes in Ovarian Cyst   • OVARIAN CYSTECTOMY  1988    left sided Precancerous Cyst---was pregnant at the time  Dr Mckeon   • APPENDECTOMY  1976   • TONSILLECTOMY AND ADENOIDECTOMY  1954       CURRENT MEDICATIONS  Home Medications     Reviewed by Sara Lombardo R.N. (Registered Nurse) on 11/05/19 at 2229  Med List Status: Complete   Medication Last Dose Status   Calcium 200 MG Tab 11/5/2019 Active   Cyanocobalamin (VITAMIN B 12 PO) 11/5/2019 Active   ELIQUIS STARTER PACK 5 MG Tab 11/5/2019 Active   ibuprofen (MOTRIN) 200 MG Tab  Active   Multiple Vitamin (MULTI-VITAMIN PO) 11/5/2019 Active   Non Formulary Request  Active   NON SPECIFIED 11/5/2019 Active   Omega-3 Fatty  "Acids (EQL OMEGA 3 FISH OIL) 1200 MG Cap 11/5/2019 Active   ondansetron (ZOFRAN) 4 MG Tab tablet  Active   tetanus-dipth-acell pertussis (ADACEL) 5-2-15.5 LF-MCG/0.5 Suspension  Active   vitamin D3, cholecalciferol, 1000 UNIT Tab 11/5/2019 Active   VITAMIN E PO 11/5/2019 Active                ALLERGIES  Allergies   Allergen Reactions   • Iodine      IV Contrast   Anaphylaxis   Topical is OK   • Penicillins      Rash       PHYSICAL EXAM  VITAL SIGNS: /63   Pulse 69   Temp 36.2 °C (97.2 °F) (Oral)   Resp 18   Ht 1.575 m (5' 2\")   Wt 72.1 kg (158 lb 15.2 oz)   SpO2 90%   BMI 29.07 kg/m²  Room air O2: 97    Constitutional: Well developed, Well nourished,  acute distress, Non-toxic appearance.   HENT: Normocephalic, Atraumatic, Bilateral external ears normal, Oropharynx moist, No oral exudates, Nose normal.   Eyes: PERRLA, EOMI, Conjunctiva normal, No discharge.   Neck: Normal range of motion, No tenderness, Supple, No stridor.   Lymphatic: No lymphadenopathy noted.   Cardiovascular: Normal heart rate, Normal rhythm, No murmurs, No rubs, No gallops.   Thorax & Lungs: Normal breath sounds, No respiratory distress, No wheezing, No chest tenderness.   Abdomen: Bowel sounds normal, Soft, No tenderness, No masses, No pulsatile masses.   Skin: Warm, Dry, No erythema, No rash.   Back: No tenderness, No CVA tenderness.    Extremities: Intact distal pulses, No edema, No tenderness, No cyanosis, No clubbing.   Musculoskeletal: Good range of motion in all major joints. No tenderness to palpation or major deformities noted.   Neurologic: Alert & oriented x 3, Normal motor function, Normal sensory function, No focal deficits noted.   Psychiatric: Affect normal, Judgment normal, Mood normal.     EKG  Normal sinus rhythm at 70 bpm, no ST elevation or depression, no widening of the QRS complex, good R wave progression, WI intervals are normal, no diagnostic Q waves, this is a 12-lead EKG there is no previous EKG available " at this time for comparison.    RADIOLOGY/PROCEDURES  CT-CTA CHEST PULMONARY ARTERY W/ RECONS   Final Result      1.  Isolated pulmonary embolus is identified in superior segment right lower pulmonary lobe.      2.  RV LV ratio is elevated.      3.  There are 2 right lung pulmonary nodules. Largest measures 9 mm in diameter. Recommend follow-up CT in 3-6 months.      Low Risk: CT at 3-6 months, then consider CT at 18-24 months      High Risk: CT at 3-6 months, then at 18-24 months      Comments: Use most suspicious nodule as guide to management. Follow-up intervals may vary according to size and risk.      Low Risk - Minimal or absent history of smoking and of other known risk factors.      High Risk - History of smoking or of other known risk factors.      Note: These recommendations do not apply to lung cancer screening, patients with immunosuppression, or patients with known primary cancer.      Fleischner Society 2017 Guidelines for Management of Incidentally Detected Pulmonary Nodules in Adults   These findings were discussed with MATT ESCUDERO on 11/05/2019.      EC-ECHOCARDIOGRAM COMPLETE W/O CONT    (Results Pending)   US-EXTREMITY VENOUS LOWER UNILAT LEFT    (Results Pending)         COURSE & MEDICAL DECISION MAKING  Pertinent Labs & Imaging studies reviewed. (See chart for details)  Patient has acute pulmonary embolus superior segment of the right lobe consistent with her history she will be taken off Eliquis started on Lovenox given first dose here in the emergency room be admitted to the hospital.  Was noted to be some right ventricular strain so further testing obtained.    FINAL IMPRESSION  1.  Acute pulmonary embolus right lung  2.  Recent DVT left leg  3.         Electronically signed by: Matt Escudero, 11/6/2019 12:01 AM

## 2019-11-06 NOTE — PROGRESS NOTES
Pt up to floor via gurney. Ambulated to bed without difficulty. No c/o pain just pressure that remains unchanged since arriving.

## 2019-11-06 NOTE — DISCHARGE SUMMARY
Discharge Summary    CHIEF COMPLAINT ON ADMISSION  Chief Complaint   Patient presents with   • Sent from Urgent Care     Went to  for chest heaviness and pounding heart.  DVT on the 24th october.       Reason for Admission  chest pain     Admission Date  11/5/2019    CODE STATUS  Full Code    HPI & HOSPITAL COURSE  This is a 68 y.o. female with a history of a recent left total knee arthroplasty complicated by left lower extremity DVT here with chest pressure.  She had been having left leg swelling after her knee replacement therefore when she saw her orthopedic surgeon, Dr. Booth, in follow-up he ordered a lower extremely ultrasound and identified a left lower extremity DVT.  He appropriately started her on Eliquis therapy and the patient has been compliant with this medication since.  A few days prior to her presentation she began experiencing chest discomfort and mild shortness of breath with progressive symptoms.  Upon her arrival to the emergency department she was found to have pulmonary embolism.  Due to progression of her thromboembolic disease despite Eliquis therapy, she was transitioned to Coumadin and Lovenox was used to bridge therapy.  Echocardiogram was performed and showed no evidence of elevated right heart pressures.  She was maintaining good oxygen saturations on room air.    She was arranged to follow-up with the INR clinic for Coumadin dose adjusting    She will take Lovenox until her INR is therapeutic for 48 hours, then she will discontinue    She will maintain anticoagulation for 6 months due to provoked DVT/PE    She will continue outpatient physical therapy and remains weightbearing as tolerated on the left lower extremity       Therefore, she is discharged in good and stable condition to home with close outpatient follow-up.    The patient recovered much more quickly than anticipated on admission.    Discharge Date  11/6/2019    FOLLOW UP ITEMS POST DISCHARGE  Follow-up with Dr. Booth as  "scheduled  Follow-up with PCP as needed  Follow-up with INR clinic for coumadin adjusting    DISCHARGE DIAGNOSES  Active Problems:    Hx of total knee arthroplasty, left POA: Unknown    DVT (deep venous thrombosis) (HCC) POA: Unknown    Acute pulmonary embolism (HCC) POA: Unknown  Resolved Problems:    * No resolved hospital problems. *      FOLLOW UP  Future Appointments   Date Time Provider Department Center   11/8/2019  3:15 PM [facility] EXAM 1 VMED None   11/11/2019  2:40 PM Emily Alex A.P.R.N. [facility]  [address]  [place] [address]  [phone]    Go on 11/11/2019        MEDICATIONS ON DISCHARGE     Medication List      START taking these medications      Instructions   enoxaparin 80 MG/0.8ML Soln inj  Commonly known as:  LOVENOX   Inject 80 mg as instructed every 12 hours for 7 days.  Dose:  80 mg     warfarin 5 MG Tabs  Commonly known as:  COUMADIN   Take 1 Tab by mouth every day at 6 PM.  Dose:  5 mg        CHANGE how you take these medications      Instructions   acetaminophen 325 MG Tabs  What changed:    · medication strength  · how much to take  · reasons to take this  Commonly known as:  TYLENOL   Take 2 Tabs by mouth every 6 hours as needed (Mild Pain; (Pain scale 1-3); Temp greater than 100.5 F).  Dose:  650 mg        CONTINUE taking these medications      Instructions   EQL OMEGA 3 FISH OIL 1200 MG Caps   Take 1 Cap by mouth every day.  Dose:  1 Cap     MULTI-VITAMIN PO   Take 1 Tab by mouth every day.  Dose:  1 Tab     Non Formulary Request   Take 1 Tab by mouth every day. Indications: suppliment for hot flashes  \"ESTRAVEN\"  Dose:  1 Tab     VITAMIN B 12 PO   Take 1 Tab by mouth every day.  Dose:  1 Tab     vitamin D3 (cholecalciferol) 1000 UNIT Tabs   Take 2,000 Units by mouth every day.  Dose:  2,000 Units     VITAMIN E PO   Take 1 Cap by mouth every day.  Dose:  1 Cap        STOP taking these medications    CALCIUM + D PO     ELIQUIS 5mg Tabs  Generic " drug:  apixaban     ELIQUIS STARTER PACK 5mg Tabs  Generic drug:  apixaban     ibuprofen 200 MG Tabs  Commonly known as:  MOTRIN            Allergies  Allergies   Allergen Reactions   • Iodine      IV Contrast   Anaphylaxis   Topical is OK   • Penicillins      Rash       DIET  Orders Placed This Encounter   Procedures   • Diet Order Regular     Standing Status:   Standing     Number of Occurrences:   1     Order Specific Question:   Diet:     Answer:   Regular [1]       ACTIVITY  As tolerated.  Weight bearing as tolerated    CONSULTATIONS  None    PROCEDURES  None    LABORATORY  Lab Results   Component Value Date    SODIUM 137 11/05/2019    POTASSIUM 3.8 11/05/2019    CHLORIDE 100 11/05/2019    CO2 23 11/05/2019    GLUCOSE 91 11/05/2019    BUN 14 11/05/2019    CREATININE 0.63 11/05/2019    CREATININE 0.9 10/05/2007        Lab Results   Component Value Date    WBC 8.4 11/05/2019    HEMOGLOBIN 14.8 11/05/2019    HEMATOCRIT 45.1 11/05/2019    PLATELETCT 314 11/05/2019        Total time of the discharge process exceeds 42 minutes.

## 2019-11-06 NOTE — PROGRESS NOTES
2 RN skin check complete with Deyvi RN.   Devices in place NA.  Skin assessed under devices NA.  Confirmed pressure ulcers found on NA.  New potential pressure ulcers noted on NA. Wound consult placed NA.  The following interventions in place Pillows for positioning, encouraged to ambulate TID, good oral intake encouraged.    Scar on right knee, small scab on left wrist and left knee. Skin otherwise W/D/I.

## 2019-11-06 NOTE — PROGRESS NOTES
Inpatient Anticoagulation Service Note    Date: 11/6/2019  Reason for Anticoagulation: Pulmonary Embolism           Hemoglobin Value: 14.8  Hematocrit Value: 45.1  Lab Platelet Value: 314  Target INR: 2.0 to 3.0    INR from last 7 days     Date/Time INR Value    11/06/19 0424  0.98    11/05/19 1814  0.97        Dose from last 7 days     Date/Time Dose (mg)    11/06/19 0900  5        Bridge Therapy: Yes  Date of Last VTE Event: 11/05/19  Bridge Therapy Start Date: 11/05/19  Days of Overlap Therapy: 2 (If less than 5 days and overlap therapy discontinued -- document reason (i.e. Bleed Risk))    (If still on overlap therapy, if No -- document reason (i.e. Bleed Risk))         Plan:  Give warfarin 5 mg po today, bridging with Lovenox   Pharmacist suggested discharge dosing: warfarin 5 mg PO daily     Catrachita Templeton, Pharmacy Intern

## 2019-11-06 NOTE — PROGRESS NOTES
Telemetry Shift Summary    Rhythm SR  HR Range 69-71  Ectopy occasional PVC  Measurements 0.18/0.08/0.42        Normal Values  Rhythm SR  HR Range    Measurements 0.12-0.20 / 0.06-0.10  / 0.30-0.52

## 2019-11-06 NOTE — CARE PLAN
Problem: Communication  Goal: The ability to communicate needs accurately and effectively will improve  Outcome: PROGRESSING AS EXPECTED  Intervention: Educate patient and significant other/support system about the plan of care, procedures, treatments, medications and allow for questions  Note:   Discussed use of pain scale, call light, medications and nonpharmacologic ways to control pain. Whiteboard updated, POC discussed.      Problem: Knowledge Deficit  Goal: Knowledge of disease process/condition, treatment plan, diagnostic tests, and medications will improve  Outcome: PROGRESSING AS EXPECTED  Goal: Knowledge of the prescribed therapeutic regimen will improve  Outcome: PROGRESSING AS EXPECTED  Intervention: Discuss information regarding therpeutic regimen and document in education  Note:   Pt educated on use of supplies, keeping skin clean and dry, medications and IV fluids. Lab tests explained.

## 2019-11-06 NOTE — ED TRIAGE NOTES
"Chief Complaint   Patient presents with   • Sent from Urgent Care     Went to  for chest heaviness and pounding heart.  DVT on the 24th october.     /88   Pulse 60   Temp 36.6 °C (97.8 °F) (Temporal)   Resp 18   Ht 1.575 m (5' 2\")   Wt 73 kg (160 lb 15 oz)   SpO2 97%   BMI 29.44 kg/m²     Pt informed of wait times. Educated on triage process.  Asked to return to triage RN for any new or worsening of symptoms. Thanked for patience.  "

## 2019-11-06 NOTE — DISCHARGE INSTRUCTIONS
Discharge Instructions per Katie Carbajal D.O.    Take Lovenox twice a day until this is discontinued by the anticoagulation clinic.  Start taking Coumadin tonight 11/6    DIET: As tolerated, maintain a consistent diet due to coumadin interacting with some foods    ACTIVITY: As tolerated    DIAGNOSIS: Pulmonary Embolism    Return to ER if Worsening shortness of breath, bleeding that isn't easily stopped or in large amounts    Discharge Instructions    Discharged to home by car with relative. Discharged via wheelchair, hospital escort: Yes.  Special equipment needed: Not Applicable    Be sure to schedule a follow-up appointment with your primary care doctor or any specialists as instructed.     Discharge Plan:   Diet Plan: Discussed  Activity Level: Discussed  Confirmed Follow up Appointment: Appointment Scheduled  Confirmed Symptoms Management: Discussed  Medication Reconciliation Updated: Yes  Influenza Vaccine Indication: Indicated: 65 years and older    I understand that a diet low in cholesterol, fat, and sodium is recommended for good health. Unless I have been given specific instructions below for another diet, I accept this instruction as my diet prescription.   Other diet: Avoid foods with high vitamin K    Special Instructions: None    · Is patient discharged on Warfarin / Coumadin?   Yes    You are receiving the drug warfarin. Please understand the importance of monitoring warfarin with scheduled PT/INR blood draws.  Follow-up with the Coumadin Clinic in on Friday for INR lab.    IMPORTANT: HOW TO USE THIS INFORMATION:  This is a summary and does NOT have all possible information about this product. This information does not assure that this product is safe, effective, or appropriate for you. This information is not individual medical advice and does not substitute for the advice of your health care professional. Always ask your health care professional for complete information about this product and  "your specific health needs.      WARFARIN - ORAL (WARF-uh-rin)      COMMON BRAND NAME(S): Coumadin      WARNING:  Warfarin can cause very serious (possibly fatal) bleeding. This is more likely to occur when you first start taking this medication or if you take too much warfarin. To decrease your risk for bleeding, your doctor or other health care provider will monitor you closely and check your lab results (INR test) to make sure you are not taking too much warfarin. Keep all medical and laboratory appointments. Tell your doctor right away if you notice any signs of serious bleeding. See also Side Effects section.      USES:  This medication is used to treat blood clots (such as in deep vein thrombosis-DVT or pulmonary embolus-PE) and/or to prevent new clots from forming in your body. Preventing harmful blood clots helps to reduce the risk of a stroke or heart attack. Conditions that increase your risk of developing blood clots include a certain type of irregular heart rhythm (atrial fibrillation), heart valve replacement, recent heart attack, and certain surgeries (such as hip/knee replacement). Warfarin is commonly called a \"blood thinner,\" but the more correct term is \"anticoagulant.\" It helps to keep blood flowing smoothly in your body by decreasing the amount of certain substances (clotting proteins) in your blood.      HOW TO USE:  Read the Medication Guide provided by your pharmacist before you start taking warfarin and each time you get a refill. If you have any questions, ask your doctor or pharmacist. Take this medication by mouth with or without food as directed by your doctor or other health care professional, usually once a day. It is very important to take it exactly as directed. Do not increase the dose, take it more frequently, or stop using it unless directed by your doctor. Dosage is based on your medical condition, laboratory tests (such as INR), and response to treatment. Your doctor or other " health care provider will monitor you closely while you are taking this medication to determine the right dose for you. Use this medication regularly to get the most benefit from it. To help you remember, take it at the same time each day. It is important to eat a balanced, consistent diet while taking warfarin. Some foods can affect how warfarin works in your body and may affect your treatment and dose. Avoid sudden large increases or decreases in your intake of foods high in vitamin K (such as broccoli, cauliflower, cabbage, brussels sprouts, kale, spinach, and other green leafy vegetables, liver, green tea, certain vitamin supplements). If you are trying to lose weight, check with your doctor before you try to go on a diet. Cranberry products may also affect how your warfarin works. Limit the amount of cranberry juice (16 ounces/480 milliliters a day) or other cranberry products you may drink or eat.      SIDE EFFECTS:  Nausea, loss of appetite, or stomach/abdominal pain may occur. If any of these effects persist or worsen, tell your doctor or pharmacist promptly. Remember that your doctor has prescribed this medication because he or she has judged that the benefit to you is greater than the risk of side effects. Many people using this medication do not have serious side effects. This medication can cause serious bleeding if it affects your blood clotting proteins too much (shown by unusually high INR lab results). Even if your doctor stops your medication, this risk of bleeding can continue for up to a week. Tell your doctor right away if you have any signs of serious bleeding, including: unusual pain/swelling/discomfort, unusual/easy bruising, prolonged bleeding from cuts or gums, persistent/frequent nosebleeds, unusually heavy/prolonged menstrual flow, pink/dark urine, coughing up blood, vomit that is bloody or looks like coffee grounds, severe headache, dizziness/fainting, unusual or persistent  tiredness/weakness, bloody/black/tarry stools, chest pain, shortness of breath, difficulty swallowing. Tell your doctor right away if any of these unlikely but serious side effects occur: persistent nausea/vomiting, severe stomach/abdominal pain, yellowing eyes/skin. This drug rarely has caused very serious (possibly fatal) problems if its effects lead to small blood clots (usually at the beginning of treatment). This can lead to severe skin/tissue damage that may require surgery or amputation if left untreated. Patients with certain blood conditions (protein C or S deficiency) may be at greater risk. Get medical help right away if any of these rare but serious side effects occur: painful/red/purplish patches on the skin (such as on the toe, breast, abdomen), change in the amount of urine, vision changes, confusion, slurred speech, weakness on one side of the body. A very serious allergic reaction to this drug is rare. However, get medical help right away if you notice any symptoms of a serious allergic reaction, including: rash, itching/swelling (especially of the face/tongue/throat), severe dizziness, trouble breathing. This is not a complete list of possible side effects. If you notice other effects not listed above, contact your doctor or pharmacist. In the [place] - Call your doctor for medical advice about side effects. You may report side effects to FDA at 0-435-BIE-7377. In [place] - Call your doctor for medical advice about side effects. You may report side effects to [facility] [place] at 1-690.317.6709.      PRECAUTIONS:  Before taking warfarin, tell your doctor or pharmacist if you are allergic to it; or if you have any other allergies. This product may contain inactive ingredients, which can cause allergic reactions or other problems. Talk to your pharmacist for more details. Before using this medication, tell your doctor or pharmacist your medical history, especially of: blood disorders (such as anemia,  hemophilia), bleeding problems (such as bleeding of the stomach/intestines, bleeding in the brain), blood vessel disorders (such as aneurysms), recent major injury/surgery, liver disease, alcohol use, mental/mood disorders (including memory problems), frequent falls/injuries. It is important that all your doctors and dentists know that you take warfarin. Before having surgery or any medical/dental procedures, tell your doctor or dentist that you are taking this medication and about all the products you use (including prescription drugs, nonprescription drugs, and herbal products). Avoid getting injections into the muscles. If you must have an injection into a muscle (for example, a flu shot), it should be given in the arm. This way, it will be easier to check for bleeding and/or apply pressure bandages. This medication may cause stomach bleeding. Daily use of alcohol while using this medicine will increase your risk for stomach bleeding and may also affect how this medication works. Limit or avoid alcoholic beverages. If you have not been eating well, if you have an illness or infection that causes fever, vomiting, or diarrhea for more than 2 days, or if you start using any antibiotic medications, contact your doctor or pharmacist immediately because these conditions can affect how warfarin works. This medication can cause heavy bleeding. To lower the chance of getting cut, bruised, or injured, use great caution with sharp objects like safety razors and nail cutters. Use an electric razor when shaving and a soft toothbrush when brushing your teeth. Avoid activities such as contact sports. If you fall or injure yourself, especially if you hit your head, call your doctor immediately. Your doctor may need to check you. The Food & Drug Administration has stated that generic warfarin products are interchangeable. However, consult your doctor or pharmacist before switching warfarin products. Be careful not to take more  "than one medication that contains warfarin unless specifically directed by the doctor or health care provider who is monitoring your warfarin treatment. Older adults may be at greater risk for bleeding while using this drug. This medication is not recommended for use during pregnancy because of serious (possibly fatal) harm to an unborn baby. Discuss the use of reliable forms of birth control with your doctor. If you become pregnant or think you may be pregnant, tell your doctor immediately. If you are planning pregnancy, discuss a plan for managing your condition with your doctor before you become pregnant. Your doctor may switch the type of medication you use during pregnancy. Very small amounts of this medication may pass into breast milk but is unlikely to harm a nursing infant. Consult your doctor before breast-feeding.      DRUG INTERACTIONS:  Drug interactions may change how your medications work or increase your risk for serious side effects. This document does not contain all possible drug interactions. Keep a list of all the products you use (including prescription/nonprescription drugs and herbal products) and share it with your doctor and pharmacist. Do not start, stop, or change the dosage of any medicines without your doctor's approval. Warfarin interacts with many prescription, nonprescription, vitamin, and herbal products. This includes medications that are applied to the skin or inside the vagina or rectum. The interactions with warfarin usually result in an increase or decrease in the \"blood-thinning\" (anticoagulant) effect. Your doctor or other health care professional should closely monitor you to prevent serious bleeding or clotting problems. While taking warfarin, it is very important to tell your doctor or pharmacist of any changes in medications, vitamins, or herbal products that you are taking. Some products that may interact with this drug include: capecitabine, imatinib, mifepristone. " Aspirin, aspirin-like drugs (salicylates), and nonsteroidal anti-inflammatory drugs (NSAIDs such as ibuprofen, naproxen, celecoxib) may have effects similar to warfarin. These drugs may increase the risk of bleeding problems if taken during treatment with warfarin. Carefully check all prescription/nonprescription product labels (including drugs applied to the skin such as pain-relieving creams) since the products may contain NSAIDs or salicylates. Talk to your doctor about using a different medication (such as acetaminophen) to treat pain/fever. Low-dose aspirin and related drugs (such as clopidogrel, ticlopidine) should be continued if prescribed by your doctor for specific medical reasons such as heart attack or stroke prevention. Consult your doctor or pharmacist for more details. Many herbal products interact with warfarin. Tell your doctor before taking any herbal products, especially bromelains, coenzyme Q10, cranberry, danshen, dong quai, fenugreek, garlic, ginkgo biloba, ginseng, and Gus's wort, among others. This medication may interfere with a certain laboratory test to measure theophylline levels, possibly causing false test results. Make sure laboratory personnel and all your doctors know you use this drug.      OVERDOSE:  If overdose is suspected, contact a poison control center or emergency room immediately. [place] residents can call the [place] National Poison Hotline at 1-790.854.9506. [place] residents can call a provincial poison control center. Symptoms of overdose may include: bloody/black/tarry stools, pink/dark urine, unusual/prolonged bleeding.      NOTES:  Do not share this medication with others. Laboratory and/or medical tests (such as INR, complete blood count) must be performed periodically to monitor your progress or check for side effects. Consult your doctor for more details.      MISSED DOSE:  For the best possible benefit, do not miss any doses. If you do miss a dose and remember on the  same day, take it as soon as you remember. If you remember on the next day, skip the missed dose and resume your usual dosing schedule. Do not double the dose to catch up because this could increase your risk for bleeding. Keep a record of missed doses to give to your doctor or pharmacist. Contact your doctor or pharmacist if you miss 2 or more doses in a row.      STORAGE:  Store at room temperature away from light and moisture. Do not store in the bathroom. Keep all medications away from children and pets. Do not flush medications down the toilet or pour them into a drain unless instructed to do so. Properly discard this product when it is  or no longer needed. Consult your pharmacist or local waste disposal company for more details about how to safely discard your product.      MEDICAL ALERT:  Your condition and medication can cause complications in a medical emergency. For information about enrolling in MedicAlert, call 1-294.810.5309 ([place]) or 1-711.803.8731 ([place]).      Information last revised 2010 Copyright(c) 2010 [employer]             Depression / Suicide Risk    As you are discharged from this [facility] facility, it is important to learn how to keep safe from harming yourself.    Recognize the warning signs:  · Abrupt changes in personality, positive or negative- including increase in energy   · Giving away possessions  · Change in eating patterns- significant weight changes-  positive or negative  · Change in sleeping patterns- unable to sleep or sleeping all the time   · Unwillingness or inability to communicate  · Depression  · Unusual sadness, discouragement and loneliness  · Talk of wanting to die  · Neglect of personal appearance   · Rebelliousness- reckless behavior  · Withdrawal from people/activities they love  · Confusion- inability to concentrate     If you or a loved one observes any of these behaviors or has concerns about self-harm, here's what you can  do:  · Talk about it- your feelings and reasons for harming yourself  · Remove any means that you might use to hurt yourself (examples: pills, rope, extension cords, firearm)  · Get professional help from the community (Mental Health, Substance Abuse, psychological counseling)  · Do not be alone:Call your Safe Contact- someone whom you trust who will be there for you.  · Call your local CRISIS HOTLINE 994-3505 or 805-681-3496  · Call your local Children's Mobile Crisis Response Team [place] (241) 750-0357 or wwwQualisteo  · Call the toll free National Suicide Prevention Hotlines   · National Suicide Prevention Lifeline 053-904-AOFS (8961)  · National Hope Line Network 800-SUICIDE (261-0993)      Pulmonary Embolism  A pulmonary embolism (PE) is a sudden blockage or decrease of blood flow in one lung or both lungs. Most blockages come from a blood clot that travels from the legs or the pelvis to the lungs. PE is a dangerous and potentially life-threatening condition if it is not treated right away.  What are the causes?  A pulmonary embolism occurs most commonly when a blood clot travels from one of your veins to your lungs. Rarely, PE is caused by air, fat, amniotic fluid, or part of a tumor traveling through your veins to your lungs.  What increases the risk?  A PE is more likely to develop in:  · People who smoke.  · People who are older, especially over 60 years of age.  · People who are overweight (obese).  · People who sit or lie still for a long time, such as during long-distance travel (over 4 hours), bed rest, hospitalization, or during recovery from certain medical conditions like a stroke.  · People who do not engage in much physical activity (sedentary lifestyle).  · People who have chronic breathing disorders.  · People who have a personal or family history of blood clots or blood clotting disease.  · People who have peripheral vascular disease (PVD), diabetes, or some types of  cancer.  · People who have heart disease, especially if the person had a recent heart attack or has congestive heart failure.  · People who have neurological diseases that affect the legs (leg paresis).  · People who have had a traumatic injury, such as breaking a hip or leg.  · People who have recently had major or lengthy surgery, especially on the hip, knee, or abdomen.  · People who have had a central line placed inside a large vein.  · People who take medicines that contain the hormone estrogen. These include birth control pills and hormone replacement therapy.  · Pregnancy or during childbirth or the postpartum period.  What are the signs or symptoms?  The symptoms of a PE usually start suddenly and include:  · Shortness of breath while active or at rest.  · Coughing or coughing up blood or blood-tinged mucus.  · Chest pain that is often worse with deep breaths.  · Rapid or irregular heartbeat.  · Feeling light-headed or dizzy.  · Fainting.  · Feeling anxious.  · Sweating.  There may also be pain and swelling in a leg if that is where the blood clot started.  These symptoms may represent a serious problem that is an emergency. Do not wait to see if the symptoms will go away. Get medical help right away. Call your local emergency services (911 in the [place] Do not drive yourself to the hospital.   How is this diagnosed?  Your health care provider will take a medical history and perform a physical exam. You may also have other tests, including:  · Blood tests to assess the clotting properties of your blood, assess oxygen levels in your blood, and find blood clots.  · Imaging tests, such as CT, ultrasound, MRI, X-ray, and other tests to see if you have clots anywhere in your body.  · An electrocardiogram (ECG) to look for heart strain from blood clots in the lungs.  How is this treated?  The main goals of PE treatment are:  · To stop a blood clot from growing larger.  · To stop new blood clots from forming.  The  type of treatment that you receive depends on many factors, such as the cause of your PE, your risk for bleeding or developing more clots, and other medical conditions that you have. Sometimes, a combination of treatments is necessary.  This condition may be treated with:  · Medicines, including newer oral blood thinners (anticoagulants), warfarin, low molecular weight heparins, thrombolytics, or heparins.  · Wearing compression stockings or using different types of devices.  · Surgery (rare) to remove the blood clot or to place a filter in your abdomen to stop the blood clot from traveling to your lungs.  Treatments for a PE are often divided into immediate treatment, long-term treatment (up to 3 months after PE), and extended treatment (more than 3 months after PE). Your treatment may continue for several months. This is called maintenance therapy, and it is used to prevent the forming of new blood clots. You can work with your health care provider to choose the treatment program that is best for you.  What are anticoagulants?   Anticoagulants are medicines that treat PEs. They can stop current blood clots from growing and stop new clots from forming. They cannot dissolve existing clots. Your body dissolves clots by itself over time. Anticoagulants are given by mouth, by injection, or through an IV tube.  What are thrombolytics?   Thrombolytics are clot-dissolving medicines that are used to dissolve a PE. They carry a high risk of bleeding, so they tend to be used only in severe cases or if you have very low blood pressure.  Follow these instructions at home:  If you are taking a newer oral anticoagulant:  · Take the medicine every single day at the same time each day.  · Understand what foods and drugs interact with this medicine.  · Understand that there are no regular blood tests required when using this medicine.  · Understand the side effects of this medicine, including excessive bruising or bleeding. Ask  your health care provider or pharmacist about other possible side effects.  If you are taking warfarin:  · Understand how to take warfarin and know which foods can affect how warfarin works in your body.  · Understand that it is dangerous to take too much or too little warfarin. Too much warfarin increases the risk of bleeding. Too little warfarin continues to allow the risk for blood clots.  · Follow your PT and INR blood testing schedule. The PT and INR results allow your health care provider to adjust your dose of warfarin. It is very important that you have your PT and INR tested as often as told by your health care provider.  · Avoid major changes in your diet, or tell your health care provider before you change your diet. Arrange a visit with a registered dietitian to answer your questions. Many foods, especially foods that are high in vitamin K, can interfere with warfarin and affect the PT and INR results. Eat a consistent amount of foods that are high in vitamin K, such as:  ¨ Spinach, kale, broccoli, cabbage, judy greens, turnip greens, [place] sprouts, peas, cauliflower, seaweed, and parsley.  ¨ Beef liver and pork liver.  ¨ Green tea.  ¨ Soybean oil.  · Tell your health care provider about any and all medicines, vitamins, and supplements that you take, including aspirin and other over-the-counter anti-inflammatory medicines. Be especially cautious with aspirin and anti-inflammatory medicines. Do not take those before you ask your health care provider if it is safe to do so. This is important because many medicines can interfere with warfarin and affect the PT and INR results.  · Do not start or stop taking any over-the-counter or prescription medicine unless your health care provider or pharmacist tells you to do so.  If you take warfarin, you will also need to do these things:  · Hold pressure over cuts for longer than usual.  · Tell your dentist and other health care providers that you are taking  warfarin before you have any procedures in which bleeding may occur.  · Avoid alcohol or drink very small amounts. Tell your health care provider if you change your alcohol intake.  · Do not use tobacco products, including cigarettes, chewing tobacco, and e-cigarettes. If you need help quitting, ask your health care provider.  · Avoid contact sports.  General instructions  · Take over-the-counter and prescription medicines only as told by your health care provider. Anticoagulant medicines can have side effects, including easy bruising and difficulty stopping bleeding. If you are prescribed an anticoagulant, you will also need to do these things:  ¨ Hold pressure over cuts for longer than usual.  ¨ Tell your dentist and other health care providers that you are taking anticoagulants before you have any procedures in which bleeding may occur.  ¨ Avoid contact sports.  · Wear a medical alert bracelet or carry a medical alert card that says you have had a PE.  · Ask your health care provider how soon you can go back to your normal activities. Stay active to prevent new blood clots from forming.  · Make sure to exercise while traveling or when you have been sitting or standing for a long period of time. It is very important to exercise. Exercise your legs by walking or by tightening and relaxing your leg muscles often. Take frequent walks.  · Wear compression stockings as told by your health care provider to help prevent more blood clots from forming.  · Do not use tobacco products, including cigarettes, chewing tobacco, and e-cigarettes. If you need help quitting, ask your health care provider.  · Keep all follow-up appointments with your health care provider. This is important.  How is this prevented?  Take these actions to decrease your risk of developing another PE:  · Exercise regularly. For at least 30 minutes every day, engage in:  ¨ Activity that involves moving your arms and legs.  ¨ Activity that encourages good  blood flow through your body by increasing your heart rate.  · Exercise your arms and legs every hour during long-distance travel (over 4 hours). Drink plenty of water and avoid drinking alcohol while traveling.  · Avoid sitting or lying in bed for long periods of time without moving your legs.  · Maintain a weight that is appropriate for your height. Ask your health care provider what weight is healthy for you.  · If you are a woman who is over 35 years of age, avoid unnecessary use of medicines that contain estrogen. These include birth control pills.  · Do not smoke, especially if you take estrogen medicines. If you need help quitting, ask your health care provider.  · If you are at very high risk for PE, wear compression stockings.  · If you recently had a PE, have regularly scheduled ultrasound testing on your legs to check for new blood clots.  If you are hospitalized, prevention measures may include:  · Early walking after surgery, as soon as your health care provider says that it is safe.  · Receiving anticoagulants to prevent blood clots. If you cannot take anticoagulants, other options may be available, such as wearing compression stockings or using different types of devices.  Get help right away if:  · You have new or increased pain, swelling, or redness in an arm or leg.  · You have numbness or tingling in an arm or leg.  · You have shortness of breath while active or at rest.  · You have chest pain.  · You have a rapid or irregular heartbeat.  · You feel light-headed or dizzy.  · You cough up blood.  · You notice blood in your vomit, bowel movement, or urine.  · You have a fever.  These symptoms may represent a serious problem that is an emergency. Do not wait to see if the symptoms will go away. Get medical help right away. Call your local emergency services (911 in the [place] Do not drive yourself to the hospital.   This information is not intended to replace advice given to you by your health care  provider. Make sure you discuss any questions you have with your health care provider.  Document Released: 12/15/2001 Document Revised: 05/25/2017 Document Reviewed: 04/13/2016  [employer] Patient Education © 2017 [employer]    Enoxaparin injection  What is this medicine?  ENOXAPARIN (ee nox a PA rin) is used after knee, hip, or abdominal surgeries to prevent blood clotting. It is also used to treat existing blood clots in the lungs or in the veins.  This medicine may be used for other purposes; ask your health care provider or pharmacist if you have questions.  COMMON BRAND NAME(S): Lovenox  What should I tell my health care provider before I take this medicine?  They need to know if you have any of these conditions:  -bleeding disorders, hemorrhage, or hemophilia  -infection of the heart or heart valves  -kidney or liver disease  -previous stroke  -prosthetic heart valve  -recent surgery or delivery of a baby  -ulcer in the stomach or intestine, diverticulitis, or other bowel disease  -an unusual or allergic reaction to enoxaparin, heparin, pork or pork products, other medicines, foods, dyes, or preservatives  -pregnant or trying to get pregnant  -breast-feeding  How should I use this medicine?  This medicine is for injection under the skin. It is usually given by a health-care professional. You or a family member may be trained on how to give the injections. If you are to give yourself injections, make sure you understand how to use the syringe, measure the dose if necessary, and give the injection. To avoid bruising, do not rub the site where this medicine has been injected. Do not take your medicine more often than directed. Do not stop taking except on the advice of your doctor or health care professional.  Make sure you receive a puncture-resistant container to dispose of the needles and syringes once you have finished with them. Do not reuse these items. Return the container to your doctor or  health care professional for proper disposal.  Talk to your pediatrician regarding the use of this medicine in children. Special care may be needed.  Overdosage: If you think you have taken too much of this medicine contact a poison control center or emergency room at once.  NOTE: This medicine is only for you. Do not share this medicine with others.  What if I miss a dose?  If you miss a dose, take it as soon as you can. If it is almost time for your next dose, take only that dose. Do not take double or extra doses.  What may interact with this medicine?  -aspirin and aspirin-like medicines  -certain medicines that treat or prevent blood clots  -dipyridamole  -NSAIDs, medicines for pain and inflammation, like ibuprofen or naproxen  This list may not describe all possible interactions. Give your health care provider a list of all the medicines, herbs, non-prescription drugs, or dietary supplements you use. Also tell them if you smoke, drink alcohol, or use illegal drugs. Some items may interact with your medicine.  What should I watch for while using this medicine?  Visit your doctor or health care professional for regular checks on your progress. Your condition will be monitored carefully while you are receiving this medicine.  Notify your doctor or health care professional and seek emergency treatment if you develop breathing problems; changes in vision; chest pain; severe, sudden headache; pain, swelling, warmth in the leg; trouble speaking; sudden numbness or weakness of the face, arm, or leg. These can be signs that your condition has gotten worse.  If you are going to have surgery, tell your doctor or health care professional that you are taking this medicine.  Do not stop taking this medicine without first talking to your doctor. Be sure to refill your prescription before you run out of medicine.  Avoid sports and activities that might cause injury while you are using this medicine. Severe falls or injuries  can cause unseen bleeding. Be careful when using sharp tools or knives. Consider using an electric razor. Take special care brushing or flossing your teeth. Report any injuries, bruising, or red spots on the skin to your doctor or health care professional.  What side effects may I notice from receiving this medicine?  Side effects that you should report to your doctor or health care professional as soon as possible:  -allergic reactions like skin rash, itching or hives, swelling of the face, lips, or tongue  -feeling faint or lightheaded, falls  -signs and symptoms of bleeding such as bloody or black, tarry stools; red or dark-brown urine; spitting up blood or brown material that looks like coffee grounds; red spots on the skin; unusual bruising or bleeding from the eye, gums, or nose  Side effects that usually do not require medical attention (report to your doctor or health care professional if they continue or are bothersome):  -pain, redness, or irritation at site where injected  This list may not describe all possible side effects. Call your doctor for medical advice about side effects. You may report side effects to FDA at 9-933-FDA-8353.  Where should I keep my medicine?  Keep out of the reach of children.  Store at room temperature between 15 and 30 degrees C (59 and 86 degrees F). Do not freeze. If your injections have been specially prepared, you may need to store them in the refrigerator. Ask your pharmacist. Throw away any unused medicine after the expiration date.  NOTE: This sheet is a summary. It may not cover all possible information. If you have questions about this medicine, talk to your doctor, pharmacist, or health care provider.  © 2018 [employer] (2015-04-21 16:06:21)

## 2019-11-06 NOTE — ASSESSMENT & PLAN NOTE
Diagnosed on 1024 and initiated on Eliquis starter pack  Symptomatically improved  Reevaluate lower extremity venous duplex

## 2019-11-06 NOTE — ED NOTES
"Pt to ED today with SOB \"It just feels like I'm not getting enough air\" for the past few days. Pt recently treated for DVT. No filter placement. Placed on Eliquis.  "

## 2019-11-06 NOTE — H&P
Hospital Medicine History & Physical Note    Date of Service  11/5/2019    Primary Care Physician  Ayaan Cormier M.D.    Consultants  none    Code Status  full    Chief Complaint  Chest Pain    History of Presenting Illness  68 y.o. female who presented 11/5/2019 with chest pain onset Sunday.  She was up walking and she noticed just uncomfortable pressure heavy sensation in the right and substernal area, the pain was constant and gradually intensifies, it has never been pleuritic.  At worst it is 4 out of 10.  She is noted palpitations at times and said that last night around midnight she felt a weird flop in her chest toward the midline.  She has had no dizziness or presyncope.    Patient had left total knee replacement which was uneventful on 10/16.  She went home the same day, she has been doing well ambulating within her home, she is full weightbearing.  She has been off opiates-she says she only took 6 pills.  When she went for her follow-up visit on 10/24 she mentioned that her calf on the left felt tight.  Her orthopedic surgeon obtained a duplex showing DVT.  She was given Eliquis starter pack and started it immediately and has been compliant with all of her doses.  Her calf pain and tightness sensation has been improving.  She has no prior history of thromboembolic disease.    Her brother had blood clot issues and had issues with non-Hodgkin's lymphoma as well contributing to that.  There is no other family history of thromboembolism.  A note now that I have not seen by still have another one left to see and a day as a respiratory failure with hearing here thinking    Review of Systems  Review of Systems   Constitutional: Negative for chills, diaphoresis, fever, malaise/fatigue and weight loss.   HENT: Negative for sore throat.    Eyes: Negative for discharge and redness.   Respiratory: Positive for cough. Negative for hemoptysis, sputum production, shortness of breath and wheezing.     Cardiovascular: Positive for chest pain, palpitations and leg swelling (improved). Negative for orthopnea.   Gastrointestinal: Negative for abdominal pain, blood in stool, constipation, diarrhea, melena, nausea and vomiting.   Genitourinary: Negative for dysuria and hematuria.   Musculoskeletal: Positive for joint pain (normal post op).        Calf pain improved   Skin: Negative for itching and rash.   Neurological: Negative for dizziness, loss of consciousness and headaches.   Psychiatric/Behavioral: The patient is not nervous/anxious.        Past Medical History   has a past medical history of Arthritis and Pain.   She had her last mammogram and colo-casimiro approximately 2 years ago  She has not had a recent Pap smear but is status post hysterectomy.    Surgical History   has a past surgical history that includes abdominal hysterectomy total (1989); ovarian cystectomy (1988); appendectomy (1976); tonsillectomy and adenoidectomy (1954); acl reconstruction (2002); knee arthroscopy (Left, 10/16/2019); medial meniscectomy (Left, 10/16/2019); and chondroplasty (Left, 10/16/2019).     Family History  Cousin had cancer of unknown type  Grandfather had lung cancer metastatic to brain  Brother had non-Hodgkin's lymphoma and thromboembolic disease  Her mother is alive and in her 90s  No family history of heart disease or diabetes    Social History   reports that she quit smoking about 42 years ago. Her smoking use included cigarettes. She has never used smokeless tobacco. She reports current alcohol use. She reports that she does not use drugs.  Lives with her spouse in [place].  Has 1 child lives here, one in [place], 2 in [place].  Drinks 1 cocktail per night.    Allergies  Allergies   Allergen Reactions   • Iodine      IV Contrast   Anaphylaxis   Topical is OK   • Penicillins      Rash       Medications  Prior to Admission Medications   Prescriptions Last Dose Informant Patient Reported? Taking?   Calcium 200 MG Tab   Yes  "No   Sig: Take 1,200 mg by mouth.   Cyanocobalamin (VITAMIN B 12 PO)   Yes No   Sig: Take 1,000 mcg by mouth every day.   ELIQUIS STARTER PACK 5 MG Tab   Yes No   Multiple Vitamin (MULTI-VITAMIN PO)   Yes No   Sig: Take  by mouth.   NON SPECIFIED   Yes No   Sig: esratavan OTC   Non Formulary Request   Yes No   Sig: Indications: suppliment for hot flashes  \"ESTRAVEN\"   Omega-3 Fatty Acids (EQL OMEGA 3 FISH OIL) 1200 MG Cap   Yes No   Sig: Take  by mouth.   VITAMIN E PO   Yes No   Sig: Take 180 mg by mouth.   ibuprofen (MOTRIN) 200 MG Tab   Yes No   Sig: Take 600 mg by mouth 2 times a day.   ondansetron (ZOFRAN) 4 MG Tab tablet   No No   Sig: Take 1 Tab by mouth every four hours as needed.   tetanus-dipth-acell pertussis (ADACEL) 5-2-15.5 LF-MCG/0.5 Suspension   No No   Si.5 mL by Intramuscular route Once PRN (Give 1 Vial Tdap IM  Z23).   vitamin D3, cholecalciferol, 1000 UNIT Tab   No No   Sig: Take 3 Tabs by mouth every day.      Facility-Administered Medications: None       Physical Exam  Temp:  [36.6 °C (97.8 °F)] 36.6 °C (97.8 °F)  Pulse:  [60-64] 64  Resp:  [18] 18  BP: (137-158)/(75-88) 158/85  SpO2:  [93 %-98 %] 97 %    Physical Exam  Vitals signs and nursing note reviewed.   Constitutional:       General: She is not in acute distress.     Appearance: Normal appearance. She is obese. She is not ill-appearing, toxic-appearing or diaphoretic.   HENT:      Head: Normocephalic and atraumatic.      Right Ear: External ear normal.      Left Ear: External ear normal.      Nose: Nose normal.      Mouth/Throat:      Mouth: Mucous membranes are moist.      Pharynx: Oropharynx is clear. No oropharyngeal exudate or posterior oropharyngeal erythema.   Eyes:      Extraocular Movements: Extraocular movements intact.      Conjunctiva/sclera: Conjunctivae normal.      Pupils: Pupils are equal, round, and reactive to light.   Neck:      Musculoskeletal: Normal range of motion.   Cardiovascular:      Rate and Rhythm: Normal " rate and regular rhythm.      Heart sounds: Murmur ( III / IV heard throughout) present. No friction rub. No gallop.    Pulmonary:      Effort: Pulmonary effort is normal. No respiratory distress.      Breath sounds: Normal breath sounds. No stridor. No wheezing, rhonchi or rales.   Chest:      Chest wall: No tenderness.   Abdominal:      General: Abdomen is flat. Bowel sounds are normal. There is no distension.      Palpations: Abdomen is soft.   Musculoskeletal:         General: Swelling (non pitting LLE) present. No tenderness.   Skin:     Coloration: Skin is not pale.      Findings: No erythema.   Neurological:      General: No focal deficit present.      Mental Status: She is alert and oriented to person, place, and time. Mental status is at baseline.      Cranial Nerves: No cranial nerve deficit.      Motor: No weakness.   Psychiatric:         Mood and Affect: Mood normal.         Laboratory:  Recent Labs     11/05/19  1814   WBC 8.4   RBC 4.81   HEMOGLOBIN 14.8   HEMATOCRIT 45.1   MCV 93.8   MCH 30.8   MCHC 32.8*   RDW 44.7   PLATELETCT 314   MPV 9.3     Recent Labs     11/05/19  1814   SODIUM 137   POTASSIUM 3.8   CHLORIDE 100   CO2 23   GLUCOSE 91   BUN 14   CREATININE 0.63   CALCIUM 9.9     Recent Labs     11/05/19  1814   ALTSGPT 22   ASTSGOT 18   ALKPHOSPHAT 80   TBILIRUBIN 0.4   GLUCOSE 91     Recent Labs     11/05/19  1814   INR 0.97     No results for input(s): NTPROBNP in the last 72 hours.      Recent Labs     11/05/19  1814   TROPONINT <6       Urinalysis:    No results found     Imaging:  CT-CTA CHEST PULMONARY ARTERY W/ RECONS   Final Result      1.  Isolated pulmonary embolus is identified in superior segment right lower pulmonary lobe.      2.  RV LV ratio is elevated.      3.  There are 2 right lung pulmonary nodules. Largest measures 9 mm in diameter. Recommend follow-up CT in 3-6 months.      Low Risk: CT at 3-6 months, then consider CT at 18-24 months      High Risk: CT at 3-6 months, then  at 18-24 months      Comments: Use most suspicious nodule as guide to management. Follow-up intervals may vary according to size and risk.      Low Risk - Minimal or absent history of smoking and of other known risk factors.      High Risk - History of smoking or of other known risk factors.      Note: These recommendations do not apply to lung cancer screening, patients with immunosuppression, or patients with known primary cancer.      Fleischner Society 2017 Guidelines for Management of Incidentally Detected Pulmonary Nodules in Adults   These findings were discussed with MAXIM CALDERA on 11/05/2019.      EC-ECHOCARDIOGRAM COMPLETE W/O CONT    (Results Pending)   US-EXTREMITY VENOUS LOWER UNILAT LEFT    (Results Pending)         Assessment/Plan:  I anticipate this patient will require at least two midnights for appropriate medical management, necessitating inpatient admission.    Acute pulmonary embolism (HCC)  Assessment & Plan  Patient has been on Eliquis for greater than 10 days with strict compliance despite this she has developed pulmonary embolism  She has failed 10 a inhibitor-place her on Lovenox with transition to warfarin  She has borderline measurements for right heart strain on CT-will get echo  Also reassess left lower extremity for residual unstable clot    DVT (deep venous thrombosis) (HCC)  Assessment & Plan  Diagnosed on 1024 and initiated on Eliquis starter pack  Symptomatically improved  Reevaluate lower extremity venous duplex    Hx of total knee arthroplasty, left  Assessment & Plan  No longer on opiates  Fall precautions        VTE prophylaxis: Lovenox Tx

## 2019-11-06 NOTE — DISCHARGE PLANNING
LSW placed call to [facility]. Pts Lovenox is $43.98 and pharmacy has full supply to fill. LSW will updated pt/RN.

## 2019-11-06 NOTE — CARE PLAN
Problem: Knowledge Deficit  Goal: Knowledge of disease process/condition, treatment plan, diagnostic tests, and medications will improve  Outcome: PROGRESSING AS EXPECTED   Understands POC and medication teaching     Problem: Discharge Barriers/Planning  Goal: Patient's continuum of care needs will be met  Outcome: PROGRESSING AS EXPECTED  Will need pre-authorization for home lovenox and home INR monitoring     Problem: Pain Management  Goal: Pain level will decrease to patient's comfort goal  Outcome: PROGRESSING AS EXPECTED  Denies pain     Problem: Respiratory:  Goal: Respiratory status will improve  Outcome: PROGRESSING AS EXPECTED  Denies SOB, on room air

## 2019-11-06 NOTE — ASSESSMENT & PLAN NOTE
Patient has been on Eliquis for greater than 10 days with strict compliance despite this she has developed pulmonary embolism  She has failed 10 a inhibitor-place her on Lovenox with transition to warfarin  She has borderline measurements for right heart strain on CT-will get echo  Also reassess left lower extremity for residual unstable clot

## 2019-11-06 NOTE — DISCHARGE PLANNING
GLORIA spoke with pharmacy, pts Lovenox is $58. Pharmacy only has part of the prescription. Pt can  part of it today and will need to go back tomorrow to  the rest.    GLORIA updated RN

## 2019-11-06 NOTE — PROGRESS NOTES
Assessment complete, medicated per MAR. Discussed POC and medications, allowed time to ask questions. Pt resting in bed, RR even and unlabored, no c/o pain or discomfort. Pt educated to call for assistance. Declines needs at this time. Safety precautions in place. Questions answered.    0600 pt rested overnight. No c/o pain or discomfort.

## 2019-11-08 ENCOUNTER — ANTICOAGULATION VISIT (OUTPATIENT)
Dept: VASCULAR LAB | Facility: MEDICAL CENTER | Age: 69
End: 2019-11-08
Attending: INTERNAL MEDICINE
Payer: MEDICARE

## 2019-11-08 DIAGNOSIS — I26.99 ACUTE PULMONARY EMBOLISM, UNSPECIFIED PULMONARY EMBOLISM TYPE, UNSPECIFIED WHETHER ACUTE COR PULMONALE PRESENT (HCC): ICD-10-CM

## 2019-11-08 LAB — INR PPP: 1.1 (ref 2–3.5)

## 2019-11-08 PROCEDURE — 99202 OFFICE O/P NEW SF 15 MIN: CPT

## 2019-11-08 PROCEDURE — 85610 PROTHROMBIN TIME: CPT

## 2019-11-08 NOTE — PROGRESS NOTES
Pt is new to warfarin and new to [facility].  Discussed indication for warfarin therapy and INR goal range. Explained our services, hours of operation, warfarin therapy, potential SE, potential DI. Discussed diet at length, with an emphasis on foods rich in vitamin K.  Discussed monitoring parameters, such as blood in urine, blood in stool, discussed what to do if a dose is missed, or suspected as missed.  Emphasized importance of compliance including follow up. Discussed lifestyle choices of ETOH & smoking and its impact on therapy.      Can pt be transitioned to DOAC? Patient can not be transitioned to DOAC due to having a pulmonary embolism while being on Eliquis.     Pt denies any unusual s/s of bleeding, bruising, clotting or any changes to diet or medications.    Anticoagulation Summary  As of 2019    INR goal:   2.0-3.0   TTR:   --   INR used for dosin.10! (2019)   Warfarin maintenance plan:   No maintenance plan   Next INR check:   2019   Target end date:            Anticoagulation Episode Summary     INR check location:       Preferred lab:       Send INR reminders to:       Comments:                   Anticoagulation Patient Findings      HPI:  Regina Cornell seen in clinic today, on anticoagulation therapy with warfarin for Pulmonary Embolism   Changes to current medical/health status since last appt: n/a  Denies signs/symptoms of bleeding and/or thrombosis since the last appt.    Denies any interval changes to diet  Denies any interval changes to medications since last appt.   Denies any complications or cost restrictions with current therapy.         A/P   INR  sub-therapeutic.   Patient has not yet gotten a therapeutic INR. Patient started warfarin with a Lovenox bridge on  and has only gone up to 1.1. Due to this warfarin was bolus dosed at 10 mg for , , and dosed at 7.5 mg for Nicanor 11/10. Patient will get an INR at [facility] on  Monday the   to assess if this regimen got her into therapeutic range or if she will need to continue with higher doses of warfarin than the 5 mg established in the hospital.    Regina is a 67 y/o female on warfarin and Enoxaparin bridge day 4. Patient had left total knee replacement on 10/16. Patient went for a follow up visit on 10/24 where patient mentioned that she felt tightness on her left calf. Patient had a duplex showing DVT and was started on Eliquis. Patient was adherent to all Eliquis doses but then on Sunday November 3rd began experiencing chest pain. Patient arrived to [facility] on Tuesday 11/05 and a chest CTA was performed which revealed an isolated pulmonary embolus in the superior segment right lower pulmonary lobe. Patient was subsequently started on warfarin and Lovenox bridge with 5mg warfarin dosing and 80 mg BID enoxaparin that day on 11/05. Patient's last INR in hospital on 11/06 was 0.98. Patient presents to [facility] on 11/08 continuing to take 5 mg warfarin and Lovenox injections 80 mg BID. Patient's INR was 1.1 so patient was instructed to do bolus warfarin regimen as outlined above. Patient will be seen on Monday November 11th to assess warfarin level with a plan to continue the Lovenox bridge until patient has two consecutive therapeutic INRS in a 48 hour period.     Follow up appointment in 3 days     Berhane Chan, KarenD          Added [facility] to care team

## 2019-11-11 ENCOUNTER — ANTICOAGULATION VISIT (OUTPATIENT)
Dept: MEDICAL GROUP | Facility: MEDICAL CENTER | Age: 69
End: 2019-11-11
Payer: MEDICARE

## 2019-11-11 DIAGNOSIS — I82.402 ACUTE DEEP VEIN THROMBOSIS (DVT) OF LEFT LOWER EXTREMITY, UNSPECIFIED VEIN (HCC): ICD-10-CM

## 2019-11-11 DIAGNOSIS — Z79.01 CHRONIC ANTICOAGULATION: Primary | ICD-10-CM

## 2019-11-11 DIAGNOSIS — I26.99 ACUTE PULMONARY EMBOLISM, UNSPECIFIED PULMONARY EMBOLISM TYPE, UNSPECIFIED WHETHER ACUTE COR PULMONALE PRESENT (HCC): ICD-10-CM

## 2019-11-11 LAB — INR PPP: 1.6 (ref 2–3.5)

## 2019-11-11 PROCEDURE — 85610 PROTHROMBIN TIME: CPT | Performed by: PHYSICIAN ASSISTANT

## 2019-11-11 PROCEDURE — 99211 OFF/OP EST MAY X REQ PHY/QHP: CPT | Performed by: PHYSICIAN ASSISTANT

## 2019-11-11 NOTE — PROGRESS NOTES
OP Anticoagulation Service Note    Date: 2019  There were no vitals filed for this visit.   pt declined vitals    Anticoagulation Summary  As of 2019    INR goal:   2.0-3.0   TTR:   --   INR used for dosin.60! (2019)   Warfarin maintenance plan:   No maintenance plan   Next INR check:   2019   Target end date:       Indications    Acute pulmonary embolism (HCC) [I26.99]  DVT (deep venous thrombosis) (HCC) [I82.409]             Anticoagulation Episode Summary     INR check location:       Preferred lab:       Send INR reminders to:       Comments:   PE while on Eliquis after diagnosis of DVT      Anticoagulation Care Providers     Provider Role Specialty Phone number    [facility]   [phone]    Ayaan Cormier M.D.  [facility] [phone]        Anticoagulation Patient Findings      HPI:   Regina Cornell seen in clinic today, on anticoagulation therapy with warfarin (a high risk medication) for DVT and PE. Pt potentially failed Eliquis, diagnosed with PE after taking Eliquis and pt was compliant. She was transitioned to warfarin/lovenox      Pt is here today to evaluate anticoagulation therapy    Previous INR was  1.1 on 19    Pt was instructed to increase warfarin dose, continue Lovenox 80 mg BID    Confirmed warfarin dosing regimen, denies missed or extra doses of coumadin.   Diet has been consistent with foods rich in vitamin K: Yes  Changes in ETOH:  No  Changes in smoking status: No  Changes in medication: No   Cost restriction: No  S/s of bleeding:  No  Falls or accidents since last visit No  Signs/symptoms  thrombosis since the last appt: No    A/P   INR  SUB therapeutic today, will require dose adjust ment today to prevent bleeding complications  and closer follow up.   Tonight and tomorrow 10 mg of warfarin, continue Lovenox 80 mg BID.      Pt lives in [place], offered her to go to lab there but she prefers to use our services in  [place]    11-20 check referral    Pt educated to contact our clinic with any changes in medications or s/s of bleeding or thrombosis. Pt is aware to seek immediate medical attention for falls, head injury or deep cuts    Follow up appointment in 2 day(s) to reduce risk of adverse events from warfarin  Deanna Mckay, PharmD

## 2019-11-12 ENCOUNTER — TELEPHONE (OUTPATIENT)
Dept: VASCULAR LAB | Facility: MEDICAL CENTER | Age: 69
End: 2019-11-12

## 2019-11-12 LAB — INR BLD: 1.1 (ref 0.9–1.2)

## 2019-11-13 ENCOUNTER — ANTICOAGULATION VISIT (OUTPATIENT)
Dept: VASCULAR LAB | Facility: MEDICAL CENTER | Age: 69
End: 2019-11-13
Attending: INTERNAL MEDICINE
Payer: MEDICARE

## 2019-11-13 DIAGNOSIS — I82.402 ACUTE DEEP VEIN THROMBOSIS (DVT) OF LEFT LOWER EXTREMITY, UNSPECIFIED VEIN (HCC): ICD-10-CM

## 2019-11-13 DIAGNOSIS — I26.99 ACUTE PULMONARY EMBOLISM, UNSPECIFIED PULMONARY EMBOLISM TYPE, UNSPECIFIED WHETHER ACUTE COR PULMONALE PRESENT (HCC): ICD-10-CM

## 2019-11-13 LAB
INR BLD: 2 (ref 0.9–1.2)
INR PPP: 2 (ref 2–3.5)

## 2019-11-13 PROCEDURE — 85610 PROTHROMBIN TIME: CPT

## 2019-11-13 PROCEDURE — 99211 OFF/OP EST MAY X REQ PHY/QHP: CPT | Performed by: PHARMACIST

## 2019-11-13 NOTE — TELEPHONE ENCOUNTER
Initial anticoag note and most recent d/c summary reviewed.  Patient with complex and interesting history.  Had first episode of dvt after knee surgery. Was started on eliquis but experienced what appears to be acute PE a few days later.     Switched to lovenox/warfarin by inpatient team.  Pending further recs from PCP it seems reasonable to continue 6 month of anticoag given presentation - as recommended at discharge.    If continues to be difficult to obtain and maintain therapeutic range we may need to reconsider change back to DOAC - perhaps pradaxa.    Will defer any indicated age appropriate screening for occult malignancy to pcp.    Michael Bloch, MD  Anticoagulation Clinic    Cc:  MARIA ESTHER Cormier

## 2019-11-13 NOTE — PROGRESS NOTES
Anticoagulation Summary  As of 11/13/2019    INR goal:   2.0-3.0   TTR:   --   INR used for dosing:      Warfarin maintenance plan:   No maintenance plan   Next INR check:      Target end date:       Indications    Acute pulmonary embolism (HCC) [I26.99]  DVT (deep venous thrombosis) (HCC) [I82.409]             Anticoagulation Episode Summary     INR check location:       Preferred lab:       Send INR reminders to:       Comments:   PE while on Eliquis after diagnosis of DVT      Anticoagulation Care Providers     Provider Role Specialty Phone number    [facility]   [phone]    Ayaan Cormier M.D.  Family Medicine [phone]                Anticoagulation Patient Findings      HPI:  Regina Wheeler Kraig seen in clinic today, on anticoagulation therapy with warfarin for Acute PE and DVT  Changes to current medical/health status since last appt: None  Denies signs/symptoms of bleeding and/or thrombosis since the last appt.    Denies any interval changes to diet  Denies any interval changes to medications since last appt.   Denies any complications or cost restrictions with current therapy.       A/P   INR is 2.0-therapeutic.     Patient to take 10 mg warfarin every day. Patient is therapeutic on 10 mg dosing regimen.  Instructed patient to discontinue lovenox.    Follow up appointment in 2 days.    Jean Carlos Car, KarenD

## 2019-11-14 ENCOUNTER — OFFICE VISIT (OUTPATIENT)
Dept: MEDICAL GROUP | Facility: PHYSICIAN GROUP | Age: 69
End: 2019-11-14
Payer: MEDICARE

## 2019-11-14 VITALS
SYSTOLIC BLOOD PRESSURE: 130 MMHG | BODY MASS INDEX: 29.63 KG/M2 | HEART RATE: 86 BPM | TEMPERATURE: 97.3 F | DIASTOLIC BLOOD PRESSURE: 78 MMHG | WEIGHT: 161 LBS | OXYGEN SATURATION: 94 % | HEIGHT: 62 IN

## 2019-11-14 DIAGNOSIS — Z09 HOSPITAL DISCHARGE FOLLOW-UP: ICD-10-CM

## 2019-11-14 DIAGNOSIS — R91.8 PULMONARY NODULES: ICD-10-CM

## 2019-11-14 DIAGNOSIS — I26.99 ACUTE PULMONARY EMBOLISM, UNSPECIFIED PULMONARY EMBOLISM TYPE, UNSPECIFIED WHETHER ACUTE COR PULMONALE PRESENT (HCC): ICD-10-CM

## 2019-11-14 DIAGNOSIS — I82.4Y2 ACUTE DEEP VEIN THROMBOSIS (DVT) OF PROXIMAL VEIN OF LEFT LOWER EXTREMITY (HCC): ICD-10-CM

## 2019-11-14 PROCEDURE — 99215 OFFICE O/P EST HI 40 MIN: CPT | Performed by: NURSE PRACTITIONER

## 2019-11-14 ASSESSMENT — PATIENT HEALTH QUESTIONNAIRE - PHQ9: CLINICAL INTERPRETATION OF PHQ2 SCORE: 0

## 2019-11-14 NOTE — ASSESSMENT & PLAN NOTE
Patient reports that she had knee surgery on October 16 and developed a left lower extremity DVT for which she was started on Eliquis.  The patient was seen in urgent care on 11/5/2019 after having increased chest pain for 2 days prior.  The patient describes the chest pain as heavy, tightness, and pressure.  The patient was also experiencing shortness of breath.  An EKG was completed during her urgent care visit and it was recommended that she be seen in the emergency room to rule out cardiac cause of her chest pain or pulmonary embolism.  The patient was seen in the emergency room on 11/5/2019, admitted and discharged the next day on 11/6/2019.  Upon her arrival to the ER, it was found that the patient had a pulmonary embolism despite Eliquis therapy for left lower extremity DVT.  The patient was transitioned to Coumadin and Lovenox was used to bridge therapy.  The patient had an echocardiogram completed that showed no evidence of elevated right heart pressures.  The patient maintained good oxygen saturations on room air while admitted for observation.  The patient was discharged with follow-up directions to follow with the Coumadin clinic for INR.  The patient was advised that she will likely need to be on anticoagulation for at least 6 months due to provoked DVT/PE.  She was advised to continue outpatient physical therapy for her left knee, follow-up with PCP, and follow-up with orthopedic surgeon.  CT scan that was completed in the emergency room on 11/5/2019 noted an isolated pulmonary embolism and superior segment of the right lower pulmonary lobe.  There are also 2 right lung pulmonary nodules, largest measuring 9 mm in diameter with a follow-up CT recommended in 3-6 months.  Patient reports that she was not told that she had pulmonary nodules noted on her CT scan.  Patient reports that she has been compliant with Coumadin 10 mg/day until follow-up with Coumadin clinic on Friday.  The patient has a follow-up  "appointment with her orthopedic surgeon today, after this appointment.  Patient reports that she is not feeling much better, still experiencing \"heaviness on her chest\".  The patient is questioning why she was required to have a hospital discharge follow-up appointment and why her calcium supplement was discontinued on discharge from hospital.  She also wants to know if she will be on anticoagulation long-term and what the next steps are.    "

## 2019-11-15 ENCOUNTER — ANTICOAGULATION VISIT (OUTPATIENT)
Dept: VASCULAR LAB | Facility: MEDICAL CENTER | Age: 69
End: 2019-11-15
Attending: INTERNAL MEDICINE
Payer: MEDICARE

## 2019-11-15 DIAGNOSIS — I26.99 ACUTE PULMONARY EMBOLISM, UNSPECIFIED PULMONARY EMBOLISM TYPE, UNSPECIFIED WHETHER ACUTE COR PULMONALE PRESENT (HCC): ICD-10-CM

## 2019-11-15 LAB — INR PPP: 2.3 (ref 2–3.5)

## 2019-11-15 PROCEDURE — 99212 OFFICE O/P EST SF 10 MIN: CPT

## 2019-11-15 PROCEDURE — 85610 PROTHROMBIN TIME: CPT

## 2019-11-15 RX ORDER — WARFARIN SODIUM 5 MG/1
10 TABLET ORAL DAILY
Qty: 60 TAB | Refills: 3 | Status: SHIPPED | OUTPATIENT
Start: 2019-11-15 | End: 2020-02-18 | Stop reason: SDUPTHER

## 2019-11-15 NOTE — PROGRESS NOTES
Anticoagulation Summary  As of 11/15/2019    INR goal:   2.0-3.0   TTR:   --   INR used for dosin.30 (11/15/2019)   Warfarin maintenance plan:   10 mg (5 mg x 2) every day   Weekly warfarin total:   70 mg   Plan last modified:   Jean Carlos Car, PharmD (2019)   Next INR check:   2019   Target end date:       Indications    Acute pulmonary embolism (HCC) [I26.99]  DVT (deep venous thrombosis) (HCC) [I82.409]             Anticoagulation Episode Summary     INR check location:       Preferred lab:       Send INR reminders to:       Comments:   PE while on Eliquis after diagnosis of DVT      Anticoagulation Care Providers     Provider Role Specialty Phone number    [facility]   [phone]    Ayaan Cormier M.D.  Family Medicine [phone]        Anticoagulation Patient Findings      HPI:  Regina Wheeler Kraig seen in clinic today for follow up on anticoagulation therapy in the presence of Acute PE.   Denies any changes to current medical/health status since last appointment.   Denies any medication or diet changes.   No current symptoms of bleeding or thrombosis reported.    A/P:   INR is therapeutic at 2.3.   Continue current regimen.     Follow up appointment in 1 week(s).    Next Appointment: 2019    Neelam Beaver, Pharm.D

## 2019-11-17 PROBLEM — Z09 HOSPITAL DISCHARGE FOLLOW-UP: Status: RESOLVED | Noted: 2019-11-14 | Resolved: 2019-11-17

## 2019-11-17 PROBLEM — R91.8 PULMONARY NODULES: Status: ACTIVE | Noted: 2019-11-17

## 2019-11-18 LAB — INR BLD: 2.3 (ref 0.9–1.2)

## 2019-11-18 NOTE — PROGRESS NOTES
Subjective:     Chief Complaint   Patient presents with   • Hospital Follow-up     PE, right lung     Regina Cornell is a 69 y.o. female who presents for Hospital Follow-up.    Discharge Date: 11/6/2019    HPI:   Hospital discharge follow-up  Acute pulmonary embolism  Acute DVT of left lower extremity  Pulmonary nodules  Patient reports that she had knee surgery on October 16 and developed a left lower extremity DVT for which she was started on Eliquis.  The patient was seen in urgent care on 11/5/2019 after having increased chest pain for 2 days prior.  The patient describes the chest pain as heavy, tightness, and pressure.  The patient was also experiencing shortness of breath.  An EKG was completed during her urgent care visit and it was recommended that she be seen in the emergency room to rule out cardiac cause of her chest pain or pulmonary embolism.  The patient was seen in the emergency room on 11/5/2019, admitted and discharged the next day on 11/6/2019.  Upon her arrival to the ER, it was found that the patient had a pulmonary embolism despite Eliquis therapy for left lower extremity DVT.  The patient was transitioned to Coumadin and Lovenox was used to bridge therapy.  The patient had an echocardiogram completed that showed no evidence of elevated right heart pressures.  The patient maintained good oxygen saturations on room air while admitted for observation.  The patient was discharged with follow-up directions to follow with the Coumadin clinic for INR.  The patient was advised that she will likely need to be on anticoagulation for at least 6 months due to provoked DVT/PE.  She was advised to continue outpatient physical therapy for her left knee, follow-up with PCP, and follow-up with orthopedic surgeon.  CT scan that was completed in the emergency room on 11/5/2019 noted an isolated pulmonary embolism and superior segment of the right lower pulmonary lobe.  There are also 2 right lung pulmonary  "nodules, largest measuring 9 mm in diameter with a follow-up CT recommended in 3-6 months.  Patient reports that she was not told that she had pulmonary nodules noted on her CT scan.  Patient reports that she has been compliant with Coumadin 10 mg/day until follow-up with Coumadin clinic on Friday.  The patient has a follow-up appointment with her orthopedic surgeon today, after this appointment.  Patient reports that she is not feeling much better, still experiencing \"heaviness on her chest\".  The patient is questioning why she was required to have a hospital discharge follow-up appointment and why her calcium supplement was discontinued on discharge from hospital.  She also wants to know if she will be on anticoagulation long-term and what the next steps are.    Current medicines (including reconciliation performed today)  Current Outpatient Medications   Medication Sig Dispense Refill   • warfarin (COUMADIN) 5 MG Tab Take 2 Tabs by mouth every day at 6 PM. 60 Tab 3   • vitamin D3, cholecalciferol, 1000 UNIT Tab Take 2,000 Units by mouth every day.     • Non Formulary Request Take 1 Tab by mouth every day. Indications: suppliment for hot flashes  \"ESTRAVEN\"     • Omega-3 Fatty Acids (EQL OMEGA 3 FISH OIL) 1200 MG Cap Take 1 Cap by mouth every day.     • Cyanocobalamin (VITAMIN B 12 PO) Take 1 Tab by mouth every day.     • Multiple Vitamin (MULTI-VITAMIN PO) Take 1 Tab by mouth every day.     • VITAMIN E PO Take 1 Cap by mouth every day.       No current facility-administered medications for this visit.      Allergies:   Iodine and Penicillins    Social History:  Social History     Socioeconomic History   • Marital status:      Spouse name: Not on file   • Number of children: Not on file   • Years of education: Not on file   • Highest education level: Not on file   Occupational History   • Not on file   Social Needs   • Financial resource strain: Not on file   • Food insecurity:     Worry: Not on file     " "Inability: Not on file   • Transportation needs:     Medical: Not on file     Non-medical: Not on file   Tobacco Use   • Smoking status: Former Smoker     Packs/day: 0.00     Types: Cigarettes     Last attempt to quit: 1977     Years since quittin.9   • Smokeless tobacco: Never Used   Substance and Sexual Activity   • Alcohol use: Yes     Drinks per session: 3 or 4     Binge frequency: Weekly     Comment: occasional   • Drug use: No   • Sexual activity: Yes     Partners: Male     Birth control/protection: Surgical     Comment: patient has had a hysterectomy for ovarian cysts.   Lifestyle   • Physical activity:     Days per week: Not on file     Minutes per session: Not on file   • Stress: Not on file   Relationships   • Social connections:     Talks on phone: Not on file     Gets together: Not on file     Attends [employer] service: Not on file     Active member of club or organization: Not on file     Attends meetings of clubs or organizations: Not on file     Relationship status: Not on file   • Intimate partner violence:     Fear of current or ex partner: Not on file     Emotionally abused: Not on file     Physically abused: Not on file     Forced sexual activity: Not on file   Other Topics Concern   • Not on file   Social History Narrative   • Not on file     ROS:   Constitutional:  Negative for fever, chills, unexpected weight change, night sweats, body aches, sleep issues, and fatigue/generalized weakness.   HEENT:  Negative for headaches, vision changes, hearing changes, ear pain, tinnitus, ear discharge, rhinorrhea, sinus congestion, sneezing, sore throat, and neck pain.    Respiratory: Positive for \"chest heaviness\" and difficulty taking deep breaths.  Negative for cough, shortness of breath, sputum production, hemoptysis, chest congestion, dyspnea, wheezing, and crackles.    Cardiovascular: Positive for \"chest heaviness\".  Negative for chest pain, palpitations, WILKERSON, paroxsymal nocturnal dyspnea, " "orthopnea, and bilateral lower extremity edema.   Musculoskeletal:  Negative for myalgias, back pain, and joint pain.   Skin:  Negative for rash, sores, lumps, itching, cyanotic skin color change.   Neurological:  Negative for dizziness, tingling, tremors, focal sensory deficit, focal weakness and headaches.   Psychiatric/Behavioral: Negative for depression, suicidal/homicidal ideation and memory loss.     Objective:     /78 (BP Location: Left arm, Patient Position: Sitting, BP Cuff Size: Adult)   Pulse 86   Temp 36.3 °C (97.3 °F) (Temporal)   Ht 1.575 m (5' 2\")   Wt 73 kg (161 lb)   SpO2 94%   Body mass index is 29.45 kg/m².    Physical Exam:  General:  Normal appearing. No distress.  HEENT:  Normocephalic. Eyes conjunctiva clear lids without ptosis, pupils equal and reactive to light accommodation, ears normal shape and contour.  Pulmonary:  Clear to ausculation.  Normal effort. No rales, ronchi, or wheezing.  Cardiovascular:  Regular rate and rhythm without murmur. Carotid and radial pulses are intact and equal bilaterally.  Neurologic:  Grossly nonfocal.  Skin:  Warm and dry.  No obvious lesions.  Musculoskeletal:  Normal gait. No extremity cyanosis, clubbing, or edema.  Psych: Irritable mood and affect. Alert and oriented x3. Judgment and insight is normal.    Assessment and Plan:   1. Hospital discharge follow-up  2. Acute pulmonary embolism, unspecified pulmonary embolism type, unspecified whether acute cor pulmonale present (HCC)  3. Acute deep vein thrombosis (DVT) of proximal vein of left lower extremity (HCC)  4. Pulmonary nodules  Advised patient to continue Coumadin as directed by anticoagulation clinic.  Patient will follow-up with PCP in 3 months to discuss pulmonary embolism, DVT, and pulmonary nodules noted on CT scan.  Patient states that she was told she would have a repeat CT scan in approximately 6 months, this will be addressed at her follow-up appointment with PCP.  Recommend that " the patient continue follow-up with orthopedic surgeon, next appointment today, and physical therapy as directed by orthopedic surgeon.    I have reviewed all hospital records including lab results, progress notes, and admission and discharge summaries. I have discussed with patient possible factors that contributed to hospitalization and how we can prevent patient from ending up in the hospital again. Educated patient on s/sx to observe for and what action to take when these occur. Reviewed current medications and educated on importance of compliance with these medications all appropriate follow-up visits are scheduled.     - Chart and discharge summary were reviewed.   - Hospitalization and results reviewed with patient.   - Medications reviewed including instructions regarding high risk medications, dosing and side effects.  - Recommended Services: Referral to AntiCoagulation Clinic  - Advance directive/POLST on file?  Yes    Patient was seen for at least 40 minutes total face-to-face time with greater than 50% of that time spent on counseling and care coordination.    Follow-up:Return in about 3 months (around 2/11/2020) for  with Dr. Cormier - PE follow up .

## 2019-11-22 ENCOUNTER — ANTICOAGULATION VISIT (OUTPATIENT)
Dept: VASCULAR LAB | Facility: MEDICAL CENTER | Age: 69
End: 2019-11-22
Attending: INTERNAL MEDICINE
Payer: MEDICARE

## 2019-11-22 DIAGNOSIS — I26.99 ACUTE PULMONARY EMBOLISM, UNSPECIFIED PULMONARY EMBOLISM TYPE, UNSPECIFIED WHETHER ACUTE COR PULMONALE PRESENT (HCC): ICD-10-CM

## 2019-11-22 LAB
INR BLD: 3.1 (ref 0.9–1.2)
INR PPP: 3.1 (ref 2–3.5)

## 2019-11-22 PROCEDURE — 99212 OFFICE O/P EST SF 10 MIN: CPT

## 2019-11-22 PROCEDURE — 85610 PROTHROMBIN TIME: CPT

## 2019-11-22 NOTE — PROGRESS NOTES
Anticoagulation Summary  As of 11/22/2019    INR goal:   2.0-3.0   TTR:   78.1 % (4 d)   INR used for dosing:   3.10! (11/22/2019)   Warfarin maintenance plan:   10 mg (5 mg x 2) every day   Weekly warfarin total:   70 mg   Plan last modified:   Jean Carlos Car, PharmD (11/13/2019)   Next INR check:   11/27/2019   Target end date:       Indications    Acute pulmonary embolism (HCC) [I26.99]  DVT (deep venous thrombosis) (HCC) [I82.409]             Anticoagulation Episode Summary     INR check location:       Preferred lab:       Send INR reminders to:       Comments:   PE while on Eliquis after diagnosis of DVT      Anticoagulation Care Providers     Provider Role Specialty Phone number    [facility]   [phone]    Ayaan Cormier M.D.  Family Medicine [phone]        Anticoagulation Patient Findings      HPI:  Regina Wheeler Kraig seen in clinic today for follow up on anticoagulation therapy in the presence of DVT and PE .   Denies any changes to current medical/health status since last appointment.   Denies any medication or diet changes.   No current symptoms of bleeding or thrombosis reported.    A/P:   INR is slightly supra-therapeutic at 3.1. Patient will like to increase vit k in diet, hence pt to only decrease dose tonight only to 7.5 mg then continue current regimen.     Follow up appointment in 5 days    Next Appointment: 11/27/2019    Neelam Beaver, Pharm.D

## 2019-11-27 ENCOUNTER — APPOINTMENT (OUTPATIENT)
Dept: VASCULAR LAB | Facility: MEDICAL CENTER | Age: 69
End: 2019-11-27
Attending: INTERNAL MEDICINE
Payer: MEDICARE

## 2019-12-05 ENCOUNTER — ANTICOAGULATION VISIT (OUTPATIENT)
Dept: VASCULAR LAB | Facility: MEDICAL CENTER | Age: 69
End: 2019-12-05
Attending: INTERNAL MEDICINE
Payer: MEDICARE

## 2019-12-05 VITALS — DIASTOLIC BLOOD PRESSURE: 77 MMHG | SYSTOLIC BLOOD PRESSURE: 138 MMHG | HEART RATE: 59 BPM

## 2019-12-05 DIAGNOSIS — Z79.01 CHRONIC ANTICOAGULATION: ICD-10-CM

## 2019-12-05 LAB
INR BLD: 2.3 (ref 0.9–1.2)
INR PPP: 2.3 (ref 2–3.5)

## 2019-12-05 PROCEDURE — 85610 PROTHROMBIN TIME: CPT

## 2019-12-05 PROCEDURE — 99211 OFF/OP EST MAY X REQ PHY/QHP: CPT

## 2019-12-05 NOTE — PROGRESS NOTES
Anticoagulation Summary  As of 2019    INR goal:   2.0-3.0   TTR:   85.3 % (2.4 wk)   INR used for dosin.30 (2019)   Warfarin maintenance plan:   10 mg (5 mg x 2) every day   Weekly warfarin total:   70 mg   Plan last modified:   Jean Carlos Car, PharmD (2019)   Next INR check:   2019   Target end date:       Indications    Acute pulmonary embolism (HCC) [I26.99]  DVT (deep venous thrombosis) (HCC) [I82.409]             Anticoagulation Episode Summary     INR check location:       Preferred lab:       Send INR reminders to:       Comments:   PE while on Eliquis after diagnosis of DVT      Anticoagulation Care Providers     Provider Role Specialty Phone number    [facility]   [phone]    Ayaan Cormier M.D.  Family Medicine [phone]        Anticoagulation Patient Findings          History of Present Illness: follow up appointment for chronic anticoagulation with the high risk medication, warfarin for Acute PE and DVT.    Last INR was out of range, dosage adjusted    Pt denies any unusual s/s of bleeding, bruising, clotting or any changes to diet or medications.     INR therapeutic  Pt is to continue with current warfarin dosing regimen.    Follow up in 2 weeks, to reduce the risk of adverse events related to this high risk medication, warfarin.    Mary Melgoza, Pharmacy Intern  I have reviewed and concur with the above plan on 2019.  Tarsha Herrera, Clinical Pharmacist, CDE, CACP

## 2019-12-20 ENCOUNTER — ANTICOAGULATION VISIT (OUTPATIENT)
Dept: VASCULAR LAB | Facility: MEDICAL CENTER | Age: 69
End: 2019-12-20
Attending: INTERNAL MEDICINE
Payer: MEDICARE

## 2019-12-20 DIAGNOSIS — Z79.01 CHRONIC ANTICOAGULATION: ICD-10-CM

## 2019-12-20 LAB
INR BLD: 2.2 (ref 0.9–1.2)
INR PPP: 2.2 (ref 2–3.5)

## 2019-12-20 PROCEDURE — 99211 OFF/OP EST MAY X REQ PHY/QHP: CPT

## 2019-12-20 PROCEDURE — 85610 PROTHROMBIN TIME: CPT

## 2019-12-20 NOTE — PROGRESS NOTES
OP Anticoagulation Service Note    Date: 2019    Anticoagulation Summary  As of 2019    INR goal:   2.0-3.0   TTR:   92.2 % (1.1 mo)   INR used for dosin.20 (2019)   Warfarin maintenance plan:   10 mg (5 mg x 2) every day   Weekly warfarin total:   70 mg   Plan last modified:   Jean Carlos Car, PharmD (2019)   Next INR check:   2020   Target end date:       Indications    Acute pulmonary embolism (HCC) [I26.99]  DVT (deep venous thrombosis) (HCC) [I82.409]             Anticoagulation Episode Summary     INR check location:       Preferred lab:       Send INR reminders to:       Comments:   PE while on Eliquis after diagnosis of DVT      Anticoagulation Care Providers     Provider Role Specialty Phone number    [facility]   [phone]    Ayaan Cormier M.D.  Family Medicine [phone]        Anticoagulation Patient Findings      HPI:   Regina Wheeler Kraig seen in clinic today, they are here today for a INR check on anticoagulation therapy     The reason for today's visit is to prevent morbidity and mortality from a blood clot or stroke and to reduce the risk of bleeding while on a anticoagulant.     Dose the patient in the medical group have a [facility] primary care provider and proper insurance?  Ayaan Cormier M.D.  [address] [place] [id number] [phone]      Additional education provided today regarding reducing bleed risk and dietary constraints:  About how vitamin K and foods work with warfarin and the bleeding risk on a anticoagulant     Any upcoming procedures:   none    Confirmed warfarin dosing regimen  Interval Changes with foods rich in vitamin K: No  Interval Changes in ETOH:   No  Interval Changes in smoking status:  No  Interval Changes in medication:  No   Cost restriction:  No  S/S of bleeding or bruising:  No  Signs/symptoms  thrombosis since the last appt:  No  Bleed risk is:  moderate,       Assessment:   INR   therapeutic.     Medications reviewed and updated--    Plan:  Continue weekly warfarin dose as noted      Follow up:  Follow up appointment in 4 week(s)       Other info:  Pt educated to contact our clinic with any changes in medications or s/s of bleeding or thrombosis    CHEST guidelines recommend frequent INR monitoring at regular intervals (a few days up to a max of 12 weeks) to ensure they are on the proper dose of warfarin and not having any complications from therapy.  INRs can dramatically change over a short time period due to diet, medications, and medical conditions.     Guilherme Wiley, PharmD, MS, BCACP, LCC    This note was created using voice recognition software (Dragon). The accuracy of the dictation is limited by the abilities of the software. I have reviewed the note prior to signing, however some errors in grammar and context are still possible. If you have any questions related to this note please do not hesitate to contact our office.

## 2020-01-17 ENCOUNTER — APPOINTMENT (OUTPATIENT)
Dept: VASCULAR LAB | Facility: MEDICAL CENTER | Age: 70
End: 2020-01-17
Payer: MEDICARE

## 2020-01-24 ENCOUNTER — ANTICOAGULATION VISIT (OUTPATIENT)
Dept: VASCULAR LAB | Facility: MEDICAL CENTER | Age: 70
End: 2020-01-24
Attending: INTERNAL MEDICINE
Payer: MEDICARE

## 2020-01-24 DIAGNOSIS — Z79.01 CHRONIC ANTICOAGULATION: ICD-10-CM

## 2020-01-24 LAB
INR BLD: 2.4 (ref 0.9–1.2)
INR PPP: 2.4 (ref 2–3.5)

## 2020-01-24 PROCEDURE — 85610 PROTHROMBIN TIME: CPT

## 2020-01-24 PROCEDURE — 99211 OFF/OP EST MAY X REQ PHY/QHP: CPT

## 2020-01-24 NOTE — PROGRESS NOTES
Anticoagulation Summary  As of 2020    INR goal:   2.0-3.0   TTR:   96.3 % (2.2 mo)   INR used for dosin.40 (2020)   Warfarin maintenance plan:   10 mg (5 mg x 2) every day   Weekly warfarin total:   70 mg   Plan last modified:   Jean Carlos Car, PharmD (2019)   Next INR check:   2020   Target end date:       Indications    Acute pulmonary embolism (HCC) [I26.99]  DVT (deep venous thrombosis) (HCC) [I82.409]             Anticoagulation Episode Summary     INR check location:       Preferred lab:       Send INR reminders to:       Comments:   PE while on Eliquis after diagnosis of DVT      Anticoagulation Care Providers     Provider Role Specialty Phone number    [facility]   [phone]    Ayaan Cormier M.D.  Family Medicine [phone]        Anticoagulation Patient Findings      HPI:  Regina Summer Kraig seen in clinic today for follow up on anticoagulation therapy in the presence of DVT and PE.   Denies any changes to current medical/health status since last appointment.   Denies any medication or diet changes.   No current symptoms of bleeding or thrombosis reported.    A/P:   INR is therapeutic.   Continue current regimen.     Follow up appointment in 4 week(s).    Next Appointment: 2020    Karen Llamas.D              '

## 2020-02-18 ENCOUNTER — OFFICE VISIT (OUTPATIENT)
Dept: MEDICAL GROUP | Facility: PHYSICIAN GROUP | Age: 70
End: 2020-02-18
Payer: MEDICARE

## 2020-02-18 VITALS
SYSTOLIC BLOOD PRESSURE: 142 MMHG | WEIGHT: 170 LBS | RESPIRATION RATE: 14 BRPM | HEIGHT: 62 IN | HEART RATE: 60 BPM | DIASTOLIC BLOOD PRESSURE: 82 MMHG | OXYGEN SATURATION: 94 % | BODY MASS INDEX: 31.28 KG/M2 | TEMPERATURE: 97.2 F

## 2020-02-18 DIAGNOSIS — I35.0 AORTIC STENOSIS, MILD: ICD-10-CM

## 2020-02-18 DIAGNOSIS — I26.99 OTHER ACUTE PULMONARY EMBOLISM WITHOUT ACUTE COR PULMONALE (HCC): ICD-10-CM

## 2020-02-18 DIAGNOSIS — I82.412 DVT OF DEEP FEMORAL VEIN, LEFT (HCC): ICD-10-CM

## 2020-02-18 DIAGNOSIS — R91.8 PULMONARY NODULES: ICD-10-CM

## 2020-02-18 PROCEDURE — 99214 OFFICE O/P EST MOD 30 MIN: CPT | Performed by: FAMILY MEDICINE

## 2020-02-18 RX ORDER — WARFARIN SODIUM 5 MG/1
10 TABLET ORAL DAILY
Qty: 60 TAB | Refills: 3 | Status: SHIPPED | OUTPATIENT
Start: 2020-02-18 | End: 2020-07-09 | Stop reason: SDUPTHER

## 2020-02-18 ASSESSMENT — PATIENT HEALTH QUESTIONNAIRE - PHQ9: CLINICAL INTERPRETATION OF PHQ2 SCORE: 0

## 2020-02-19 NOTE — PROGRESS NOTES
"Patient comes in for follow-up after she had left knee replacement surgery which was complicated unfortunately by DVT in her left leg.  She was placed on Xarelto but then in spite of that developed a pulmonary embolism.  This medicine was switched to Coumadin and she is taking 10 mg a day and is being supervised by our anticoagulation service.  She still has a \"heaviness\" in her chest from time to time and has a sensation of difficulty breathing but says it comes and goes.  It is hard for her to quantify exactly.  She also carries a history of mild aortic stenosis and needs to have follow-up with her cardiologist.  She had seen Dr. Lockhart in the past.  She was noted to have pulmonary nodules on her CT scan in November 2019.  There were 2 nodules in the right lung the largest measuring 9 mm in diameter.  A follow-up CT was recommended in 3 to 6 months.    I reviewed the following    Past Medical History:   Diagnosis Date   • Arthritis     knees   • Pain     knees        Past Surgical History:   Procedure Laterality Date   • KNEE ARTHROSCOPY Left 10/16/2019    Procedure: ARTHROSCOPY, KNEE;  Surgeon: Bobo Booth M.D.;  Location: [facility];  Service: Orthopedics   • MEDIAL MENISCECTOMY Left 10/16/2019    Procedure: MENISCECTOMY, KNEE, MEDIAL- PARTIAL;  Surgeon: Bobo Booth M.D.;  Location: [facility];  Service: Orthopedics   • CHONDROPLASTY Left 10/16/2019    Procedure: CHONDROPLASTY- RAMIREZ;  Surgeon: Bobo Booth M.D.;  Location: [facility];  Service: Orthopedics   • ACL RECONSTRUCTION  2002    With patella tendon for repair   • ABDOMINAL HYSTERECTOMY TOTAL  1989    Precancerous Changes in Ovarian Cyst   • OVARIAN CYSTECTOMY  1988    left sided Precancerous Cyst---was pregnant at the time  Dr Mckeon   • APPENDECTOMY  1976   • TONSILLECTOMY AND ADENOIDECTOMY  1954       Allergies   Allergen Reactions   • Iodine      IV Contrast   Anaphylaxis   Topical is OK   • " "Penicillins      Rash       Current Outpatient Medications   Medication Sig Dispense Refill   • warfarin (COUMADIN) 5 MG Tab Take 2 Tabs by mouth every day at 6 PM. 60 Tab 3   • vitamin D3, cholecalciferol, 1000 UNIT Tab Take 2,000 Units by mouth every day.     • Non Formulary Request Take 1 Tab by mouth every day. Indications: suppliment for hot flashes  \"ESTRAVEN\"     • Omega-3 Fatty Acids (EQL OMEGA 3 FISH OIL) 1200 MG Cap Take 1 Cap by mouth every day.     • Cyanocobalamin (VITAMIN B 12 PO) Take 1 Tab by mouth every day.     • Multiple Vitamin (MULTI-VITAMIN PO) Take 1 Tab by mouth every day.     • VITAMIN E PO Take 1 Cap by mouth every day.       No current facility-administered medications for this visit.         History reviewed. No pertinent family history.    Social History     Socioeconomic History   • Marital status:      Spouse name: Not on file   • Number of children: Not on file   • Years of education: Not on file   • Highest education level: Not on file   Occupational History   • Not on file   Social Needs   • Financial resource strain: Not on file   • Food insecurity     Worry: Not on file     Inability: Not on file   • Transportation needs     Medical: Not on file     Non-medical: Not on file   Tobacco Use   • Smoking status: Former Smoker     Packs/day: 0.00     Types: Cigarettes     Last attempt to quit: 1977     Years since quittin.1   • Smokeless tobacco: Never Used   Substance and Sexual Activity   • Alcohol use: Yes     Drinks per session: 3 or 4     Binge frequency: Weekly     Comment: occasional   • Drug use: No   • Sexual activity: Yes     Partners: Male     Birth control/protection: Surgical     Comment: patient has had a hysterectomy for ovarian cysts.   Lifestyle   • Physical activity     Days per week: Not on file     Minutes per session: Not on file   • Stress: Not on file   Relationships   • Social connections     Talks on phone: Not on file     Gets together: Not on file "     Attends [employer] service: Not on file     Active member of club or organization: Not on file     Attends meetings of clubs or organizations: Not on file     Relationship status: Not on file   • Intimate partner violence     Fear of current or ex partner: Not on file     Emotionally abused: Not on file     Physically abused: Not on file     Forced sexual activity: Not on file   Other Topics Concern   • Not on file   Social History Narrative   • Not on file      Physical Exam   Constitutional: She is oriented. She appears well-developed and well-nourished. No distress.   HENT:  Head: Normocephalic and atraumatic.   Right Ear: External ear normal. Ear canal and TM normal   Left Ear: External ear normal. Ear canal and TM normal  Nose: Nose normal.   Mouth/Throat: Oropharynx is clear and moist.   Eyes: Conjunctivae and extraocular motions are normal. Pupils are equal, round, and reactive to light. Fundi benign bilaterally   Neck: No thyromegaly present.   Cardiovascular: Normal rate, regular rhythm, normal heart sounds and intact distal pulses.  Exam reveals no gallop.    Grade 2/6 systolic murmur at the aortic area.  No S3 or S4.  Pulmonary/Chest: Effort normal and breath sounds normal. No respiratory distress. She has no wheezes. She has no rales.   Abdominal: Soft. Bowel sounds are normal. No hepatosplenomegaly She exhibits no distension. No tenderness. She has no rebound and no guarding.   Musculoskeletal: Normal range of motion. She exhibits no edema and negative Homans sign bilaterally  Lymphadenopathy:     She has no cervical adenopathy.   No supraclavicular adenopathy  Neurological: She is alert and oriented. She has normal reflexes.        Babinskis downgoing bilaterally   Skin: Skin is warm and dry. No rash noted. No erythema.   Psychiatric: She has a normal mood and appropriate affect. Her behavior is normal. Judgment and thought content normal.     1. Other acute pulmonary embolism without acute cor  pulmonale (HCC)  CT-CTA CHEST PULMONARY ARTERY W/ RECONS--to be done in mid May 2020    warfarin (COUMADIN) 5 MG Tab--refill.  Continue to see [facility] anticoagulation service   2. Aortic stenosis, mild  REFERRAL TO CARDIOLOGY--Dr. Qamar Verdugo   3. DVT of deep femoral vein, left (HCC)  US-EXTREMITY VENOUS LOWER UNILAT LEFT--to be done in mid May 2020   4. Pulmonary nodules   this will be followed up with her CT scan as mentioned above       Recheck with me PRN    Please note that this dictation was created using voice recognition software. I have worked with consultants from the vendor as well as technical experts from [facility] to optimize the interface. I have made every reasonable attempt to correct obvious errors, but I expect that there are errors of grammar and possibly content that I did not discover before finalizing the note.

## 2020-02-21 ENCOUNTER — ANTICOAGULATION VISIT (OUTPATIENT)
Dept: VASCULAR LAB | Facility: MEDICAL CENTER | Age: 70
End: 2020-02-21
Attending: INTERNAL MEDICINE
Payer: MEDICARE

## 2020-02-21 DIAGNOSIS — Z79.01 CHRONIC ANTICOAGULATION: ICD-10-CM

## 2020-02-21 LAB
INR BLD: 2.4 (ref 0.9–1.2)
INR PPP: 2.4 (ref 2–3.5)

## 2020-02-21 PROCEDURE — 99211 OFF/OP EST MAY X REQ PHY/QHP: CPT

## 2020-02-21 PROCEDURE — 85610 PROTHROMBIN TIME: CPT

## 2020-02-21 NOTE — PROGRESS NOTES
Anticoagulation Summary  As of 2020    INR goal:   2.0-3.0   TTR:   97.4 % (3.2 mo)   INR used for dosin.40 (2020)   Warfarin maintenance plan:   10 mg (5 mg x 2) every day   Weekly warfarin total:   70 mg   Plan last modified:   Jean Carlos Car, PharmD (2019)   Next INR check:   3/26/2020   Target end date:       Indications    Acute pulmonary embolism (HCC) [I26.99]  DVT (deep venous thrombosis) (HCC) [I82.409]             Anticoagulation Episode Summary     INR check location:       Preferred lab:       Send INR reminders to:       Comments:   PE while on Eliquis after diagnosis of DVT      Anticoagulation Care Providers     Provider Role Specialty Phone number    [facility]   [phone]    Ayaan Cormier M.D.  Family Medicine [phone]        Anticoagulation Patient Findings      HPI:  Regina Wheeler Kraig seen in clinic today for follow up on anticoagulation therapy in the presence of DVT and PE.   Denies any changes to current medical/health status since last appointment.   Denies any medication or diet changes.   No current symptoms of bleeding or thrombosis reported.    A/P:   INR is therapeutic at 2.4.   Continue current regimen.     Follow up appointment in 5 week(s).    Next Appointment: 2020,    Neelam Beaver, Pharm.D

## 2020-03-26 ENCOUNTER — ANTICOAGULATION VISIT (OUTPATIENT)
Dept: VASCULAR LAB | Facility: MEDICAL CENTER | Age: 70
End: 2020-03-26
Attending: INTERNAL MEDICINE
Payer: MEDICARE

## 2020-03-26 DIAGNOSIS — Z79.01 CHRONIC ANTICOAGULATION: ICD-10-CM

## 2020-03-26 LAB — INR PPP: 1.4 (ref 2–3.5)

## 2020-03-26 PROCEDURE — 85610 PROTHROMBIN TIME: CPT

## 2020-03-26 PROCEDURE — 99212 OFFICE O/P EST SF 10 MIN: CPT

## 2020-03-26 NOTE — PROGRESS NOTES
Anticoagulation Summary  As of 3/26/2020    INR goal:   2.0-3.0   TTR:   82.2 % (4.3 mo)   INR used for dosin.40! (3/26/2020)   Warfarin maintenance plan:   10 mg (5 mg x 2) every day   Weekly warfarin total:   70 mg   Plan last modified:   Jaen Carlos Car, PharmD (2019)   Next INR check:   2020   Target end date:       Indications    Acute pulmonary embolism (HCC) [I26.99]  DVT (deep venous thrombosis) (HCC) [I82.409]             Anticoagulation Episode Summary     INR check location:       Preferred lab:       Send INR reminders to:       Comments:   PE while on Eliquis after diagnosis of DVT      Anticoagulation Care Providers     Provider Role Specialty Phone number    [facility]   [phone]    Ayaan Cormier M.D.  Family Medicine [phone]        Anticoagulation Patient Findings    History of Present Illness: follow up appointment for chronic anticoagulation with the high risk medication, warfarin for DVT    Last INR was at goal, pt is now sub therapeutic.  Pt denies any change to diet or any missed doses.  Will bolus dose with 20mg po qhs x2 then resume current dosing regimen. Follow up in 2 weeks, to reduce the risk of adverse events related to this high risk medication, warfarin.    Pt declines vitals today      Tarsha Herrera, Clinical Pharmacist

## 2020-03-27 LAB — INR BLD: 1.4 (ref 0.9–1.2)

## 2020-04-10 ENCOUNTER — ANTICOAGULATION VISIT (OUTPATIENT)
Dept: VASCULAR LAB | Facility: MEDICAL CENTER | Age: 70
End: 2020-04-10
Attending: INTERNAL MEDICINE
Payer: MEDICARE

## 2020-04-10 DIAGNOSIS — Z79.01 CHRONIC ANTICOAGULATION: ICD-10-CM

## 2020-04-10 LAB
INR BLD: 2.3 (ref 0.9–1.2)
INR PPP: 2.3 (ref 2–3.5)

## 2020-04-10 PROCEDURE — 99211 OFF/OP EST MAY X REQ PHY/QHP: CPT | Performed by: PHARMACIST

## 2020-04-10 PROCEDURE — 85610 PROTHROMBIN TIME: CPT

## 2020-04-10 NOTE — PATIENT INSTRUCTIONS
You will be seen at our drive thru service at [facility] located on the west side of the building at  [address] [place] [address].  [place] [address]         Please call [phone] with any questions.

## 2020-04-10 NOTE — PROGRESS NOTES
Anticoagulation Summary  As of 4/10/2020    INR goal:   2.0-3.0   TTR:   77.2 % (4.8 mo)   INR used for dosin.30 (4/10/2020)   Warfarin maintenance plan:   10 mg (5 mg x 2) every day   Weekly warfarin total:   70 mg   Plan last modified:   Jean Carlos Car, PharmD (2019)   Next INR check:   2020   Target end date:       Indications    Acute pulmonary embolism (HCC) [I26.99]  DVT (deep venous thrombosis) (HCC) [I82.409]             Anticoagulation Episode Summary     INR check location:       Preferred lab:       Send INR reminders to:       Comments:   PE while on Eliquis after diagnosis of DVT      Anticoagulation Care Providers     Provider Role Specialty Phone number    [facility]   [phone]    Ayaan Cormier M.D.  Family Medicine [phone]                Anticoagulation Patient Findings      HPI:  Regina Wheeler Kraig seen in clinic today, on anticoagulation therapy with warfarin for PE  Changes to current medical/health status since last appt: denies  Denies signs/symptoms of bleeding and/or thrombosis since the last appt.    Denies any interval changes to diet  Denies any interval changes to medications since last appt.   Denies any complications or cost restrictions with current therapy.   Verified current warfarin dosing schedule.      A/P   INR  is-therapeutic.   Will continue with the same warfarin dosing.     Follow up appointment in 3 week(s) at  pharmacy.    Yissel Quiles, PharmD

## 2020-04-24 ENCOUNTER — TELEPHONE (OUTPATIENT)
Dept: HEALTH INFORMATION MANAGEMENT | Facility: OTHER | Age: 70
End: 2020-04-24

## 2020-04-24 NOTE — TELEPHONE ENCOUNTER
1. Caller Name: Regina Cornell                        Call Back Number: [phone]  [facility] PCP or Specialty Provider: Yes Pending a new PCP because Dr. Cormier will be retiring.         2.  Does patient have any active symptoms of respiratory illness (fever OR cough OR shortness of breath OR sore throat)? Yes, the patient reports the following respiratory symptoms: cough and shortness of breath.  She states she believes that this is related to her pulmonary embolism which she was diagnosed with in November.  Her cough and shortness of breath are stable and have not become worse.  Her cough is dry and not constant. She is needing to establish a PCP and have a follow up CT scan.     She denies any other symptoms such fever, chills, sore throat, muscle aches fatigue or generally a feeling of illness.     3.  Does patient have any comoribidities?   Pulmonary embolism.     4.  Has the patient traveled in the last 14 days OR had any known contact with someone who is suspected or confirmed to have COVID-19?  No.  She only travels to [place] for her INR's.  She lives in [place] and states that she has been staying home and not exposing herself to the general public.     5. Disposition: Cleared by RN Triage; OK to keep/schedule appointment    Note routed to [facility] Provider: ELLIOT only.

## 2020-05-01 ENCOUNTER — TELEMEDICINE (OUTPATIENT)
Dept: MEDICAL GROUP | Facility: MEDICAL CENTER | Age: 70
End: 2020-05-01
Payer: MEDICARE

## 2020-05-01 ENCOUNTER — ANTICOAGULATION VISIT (OUTPATIENT)
Dept: VASCULAR LAB | Facility: MEDICAL CENTER | Age: 70
End: 2020-05-01
Attending: INTERNAL MEDICINE
Payer: MEDICARE

## 2020-05-01 VITALS
DIASTOLIC BLOOD PRESSURE: 87 MMHG | SYSTOLIC BLOOD PRESSURE: 142 MMHG | HEIGHT: 62 IN | TEMPERATURE: 97.9 F | BODY MASS INDEX: 29.44 KG/M2 | WEIGHT: 160 LBS

## 2020-05-01 DIAGNOSIS — R91.8 PULMONARY NODULES: ICD-10-CM

## 2020-05-01 DIAGNOSIS — Z79.01 CHRONIC ANTICOAGULATION: ICD-10-CM

## 2020-05-01 DIAGNOSIS — Z12.31 ENCOUNTER FOR SCREENING MAMMOGRAM FOR MALIGNANT NEOPLASM OF BREAST: ICD-10-CM

## 2020-05-01 DIAGNOSIS — Z11.59 NEED FOR HEPATITIS C SCREENING TEST: ICD-10-CM

## 2020-05-01 DIAGNOSIS — R73.9 HYPERGLYCEMIA: ICD-10-CM

## 2020-05-01 DIAGNOSIS — Z12.39 BREAST CANCER SCREENING: ICD-10-CM

## 2020-05-01 DIAGNOSIS — M23.232 DERANGEMENT OF OTHER MEDIAL MENISCUS DUE TO OLD TEAR OR INJURY, LEFT KNEE: ICD-10-CM

## 2020-05-01 DIAGNOSIS — I82.412 DVT OF DEEP FEMORAL VEIN, LEFT (HCC): ICD-10-CM

## 2020-05-01 DIAGNOSIS — M81.0 AGE-RELATED OSTEOPOROSIS WITHOUT CURRENT PATHOLOGICAL FRACTURE: ICD-10-CM

## 2020-05-01 DIAGNOSIS — I26.99 OTHER ACUTE PULMONARY EMBOLISM WITHOUT ACUTE COR PULMONALE (HCC): ICD-10-CM

## 2020-05-01 DIAGNOSIS — E78.5 DYSLIPIDEMIA: ICD-10-CM

## 2020-05-01 DIAGNOSIS — M85.80 OSTEOPENIA, UNSPECIFIED LOCATION: ICD-10-CM

## 2020-05-01 PROBLEM — R93.1 ABNORMAL ECHOCARDIOGRAM: Status: RESOLVED | Noted: 2018-08-02 | Resolved: 2020-05-01

## 2020-05-01 PROBLEM — S83.232A COMPLEX TEAR OF MEDIAL MENISCUS, CURRENT INJURY, LEFT KNEE, INITIAL ENCOUNTER: Status: RESOLVED | Noted: 2019-10-16 | Resolved: 2020-05-01

## 2020-05-01 LAB — INR PPP: 3.3 (ref 2–3.5)

## 2020-05-01 PROCEDURE — 99212 OFFICE O/P EST SF 10 MIN: CPT

## 2020-05-01 PROCEDURE — 99214 OFFICE O/P EST MOD 30 MIN: CPT | Mod: 95,CR | Performed by: FAMILY MEDICINE

## 2020-05-01 PROCEDURE — 85610 PROTHROMBIN TIME: CPT

## 2020-05-01 ASSESSMENT — FIBROSIS 4 INDEX: FIB4 SCORE: 0.86

## 2020-05-01 NOTE — PROGRESS NOTES
Anticoagulation Summary  As of 5/1/2020    INR goal:   2.0-3.0   TTR:   76.2 % (5.5 mo)   INR used for dosing:   3.30! (5/1/2020)   Warfarin maintenance plan:   10 mg (5 mg x 2) every day   Weekly warfarin total:   70 mg   Plan last modified:   Jean Carlos Car, PharmD (11/13/2019)   Next INR check:   5/22/2020   Target end date:       Indications    Acute pulmonary embolism (HCC) [I26.99]  DVT (deep venous thrombosis) (HCC) [I82.409]             Anticoagulation Episode Summary     INR check location:       Preferred lab:       Send INR reminders to:       Comments:   PE while on Eliquis after diagnosis of DVT      Anticoagulation Care Providers     Provider Role Specialty Phone number    [facility]   [phone]    Ayaan Cormier M.D.  Family Medicine [phone]        Anticoagulation Patient Findings      HPI:  Regina Summer Kraig seen in clinic today for follow up on anticoagulation therapy in the presence of DVT and PE.   Denies any changes to current medical/health status since last appointment.   Denies any medication or diet changes.   No current symptoms of bleeding or thrombosis reported.    A/P:   INR is supra-therapeutic at 3.3 .   Patient to take decreased dose of 5 mg then continue current regimen.     Follow up appointment in 3 week(s).    Neelam Beaver Pharm.D

## 2020-05-02 NOTE — ASSESSMENT & PLAN NOTE
CTA 11/5/2019: 9 mm oval-shaped pulmonary nodule is identified in the right middle pulmonary lobe. 3 mm nodule is noted in the right upper lobe on image 94. Oval-shaped nodule along the left major fissure is likely an intrapulmonary lymph node. The patient quit smoking in 1977, minimal tobacco use prior to quitting.

## 2020-05-02 NOTE — ASSESSMENT & PLAN NOTE
This is a chronic problem. The patient had left knee arthroscopy and medial meniscectomy and chondroplasty by Dr. Booth on 10/16/20 which was complicated by a DVT and subsequently PE. Patient developed PE while on eliquis thus was transitioned to warfarin. The plan is for repeat LE US and CT chest this month with possible discontinuation of anticoagulation pending results.

## 2020-05-02 NOTE — ASSESSMENT & PLAN NOTE
DEXA scan 2017 demonstrated:  Major Osteoporotic     14.9%  Hip     1.7%    The patient has not been able to engage in weight bearing exercise for about a year secondary to knee pain.

## 2020-05-02 NOTE — PROGRESS NOTES
Telemedicine Visit: New Patient     This encounter was conducted via [employer]   Verbal consent was obtained. Patient's identity was verified.    Subjective:     CC:   Regina Cornell is a 69 y.o. female presenting to establish care and to discuss the evaluation and management of    Regina lives in [place] with her  Ishaan Cornell DDS. She works at their dental office. She has two sons. She is due for a mammogram and DEXA.    DVT of deep femoral vein, left (HCC)  This is a chronic problem. The patient had left knee arthroscopy and medial meniscectomy and chondroplasty by Dr. Booth on 10/16/20 which was complicated by a DVT and subsequently PE. Patient developed PE while on eliquis thus was transitioned to warfarin. The plan is for repeat LE US and CT chest this month with possible discontinuation of anticoagulation pending results.    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 Booth 10/16/20. She reports re-injuring her knee riding horses a couple months ago and has been working with PT which has been effective.    Osteopenia  DEXA scan 2017 demonstrated:  Major Osteoporotic     14.9%  Hip     1.7%    The patient has not been able to engage in weight bearing exercise for about a year secondary to knee pain.    Pulmonary nodules  CTA 11/5/2019: 9 mm oval-shaped pulmonary nodule is identified in the right middle pulmonary lobe. 3 mm nodule is noted in the right upper lobe on image 94. Oval-shaped nodule along the left major fissure is likely an intrapulmonary lymph node. The patient quit smoking in 1977, minimal tobacco use prior to quitting.      ROS  See HPI  Constitutional: Negative for fever, chills and malaise/fatigue.   HENT: Negative for congestion.    Eyes: Negative for pain.   Respiratory: Negative for cough and shortness of breath.    Cardiovascular: Negative for leg swelling.  "  Gastrointestinal: Negative for nausea, vomiting, abdominal pain and diarrhea.   Genitourinary: Negative for dysuria and hematuria.   Skin: Negative for rash.   Neurological: Negative for dizziness, focal weakness and headaches.   Endo/Heme/Allergies: Does not bruise/bleed easily.   Psychiatric/Behavioral: Negative for depression.  The patient is not nervous/anxious.      Allergies   Allergen Reactions   • Iodine      IV Contrast   Anaphylaxis   Topical is OK   • Penicillins      Rash       Current medicines (including changes today)  Current Outpatient Medications   Medication Sig Dispense Refill   • warfarin (COUMADIN) 5 MG Tab Take 2 Tabs by mouth every day at 6 PM. 60 Tab 3   • vitamin D3, cholecalciferol, 1000 UNIT Tab Take 2,000 Units by mouth every day.     • Non Formulary Request Take 1 Tab by mouth every day. Indications: suppliment for hot flashes  \"ESTRAVEN\"     • Omega-3 Fatty Acids (EQL OMEGA 3 FISH OIL) 1200 MG Cap Take 1 Cap by mouth every day.     • Cyanocobalamin (VITAMIN B 12 PO) Take 1 Tab by mouth every day.     • Multiple Vitamin (MULTI-VITAMIN PO) Take 1 Tab by mouth every day.     • VITAMIN E PO Take 1 Cap by mouth every day.       No current facility-administered medications for this visit.        She  has a past medical history of Arthritis and Pain.  She  has a past surgical history that includes abdominal hysterectomy total (); ovarian cystectomy (); appendectomy (); tonsillectomy and adenoidectomy (); acl reconstruction (); knee arthroscopy (Left, 10/16/2019); medial meniscectomy (Left, 10/16/2019); and chondroplasty (Left, 10/16/2019).      Family History   Problem Relation Age of Onset   • Cancer Brother         lymphoma   • Cancer Paternal Grandfather         glioblastoma     Family Status   Relation Name Status   • Mo  Alive   • Fa   at age 42        [place] [employer]   • Miguel  Alive   • Miguel  Alive   • Son  Alive   • Son  Alive   • Bro  (Not Specified) " "  • PGFa  (Not Specified)       Patient Active Problem List    Diagnosis Date Noted   • DVT of deep femoral vein, left ([facility]) 02/18/2020   • Pulmonary nodules 11/17/2019   • DVT (deep venous thrombosis) (Former[facility]) 11/05/2019   • Acute pulmonary embolism (HCC) 11/05/2019   • Osteophyte, right knee 06/03/2019   • Derangement of other medial meniscus due to old tear or injury, left knee 06/03/2019   • BMI 29.0-29.9,adult 06/27/2018   • Aortic stenosis, mild 05/22/2017   • Osteopenia 04/10/2017          Objective:   Vitals obtained by patient:  /87   Temp 36.6 °C (97.9 °F) (Temporal)   Ht 1.575 m (5' 2\")   Wt 72.6 kg (160 lb)   BMI 29.26 kg/m²     Physical Exam:  Constitutional: Alert, no distress, well-groomed.  Skin: No rashes in visible areas.  Eye: Round. Conjunctiva clear, lids normal. No icterus.   ENMT: Lips pink without lesions, good dentition, moist mucous membranes. Phonation normal.  CV: Pulse as reported by patient  Respiratory: Unlabored respiratory effort, no cough or audible wheeze  Psych: Alert and oriented x3, normal affect and mood.       Assessment and Plan:   The following treatment plan was discussed:     1. DVT of deep femoral vein, left ([facility])  2. Other acute pulmonary embolism without acute cor pulmonale ([facility])  This is a chronic problem. The patient developed a provoked DVT s/p left knee arthroscopy and subsequently a PE while on Eliquis. Transitioned to warfarin with which she reports compliance.  - Patient completing at least 6 months anticoagulation  - Repeat CTA and LE US this month  - Will consider stopping anticoagulation pending imaging results    3. Derangement of other medial meniscus due to old tear or injury, left knee  Patient is s/p surgical repair by Dr. Booth 10/16/20. She re-injured her knee and is working with PT currently.  - Continue PT    4. Osteopenia, unspecified location  This is a chronic problem. DEXA 2017 demonstrated osteopenia.  - Recommended weight bearing " "exercise, vit D 800IU daily and calcium 1,200mg daily preferably from dietary sources    5. Pulmonary nodules  CT 11/6/19: \"9 mm oval-shaped pulmonary nodule is identified in the right middle pulmonary lobe. 3 mm nodule is noted in the right upper lobe on image 94.   Oval-shaped nodule along the left major fissure is likely an intrapulmonary lymph node.\" Recommendation to repeat CT in 3-6 months  - Repeat CT ordered      Follow-up: Return in about 3 months (around 8/1/2020).       Please note this dictation was created using voice recognition software. I have made every reasonable attempt to correct obvious errors, but I expect there may be errors of grammar, and possibly content, that I did not discover before finalizing the note.   "

## 2020-05-02 NOTE — ASSESSMENT & PLAN NOTE
Patient is s/p left knee arthroscopy by Dr. Booth 10/16/20. She reports re-injuring her knee riding horses a couple months ago and has been working with PT which has been effective.

## 2020-05-22 ENCOUNTER — HOSPITAL ENCOUNTER (OUTPATIENT)
Dept: RADIOLOGY | Facility: MEDICAL CENTER | Age: 70
End: 2020-05-22
Attending: FAMILY MEDICINE | Admitting: FAMILY MEDICINE
Payer: MEDICARE

## 2020-05-22 ENCOUNTER — ANTICOAGULATION VISIT (OUTPATIENT)
Dept: VASCULAR LAB | Facility: MEDICAL CENTER | Age: 70
End: 2020-05-22
Attending: INTERNAL MEDICINE
Payer: MEDICARE

## 2020-05-22 DIAGNOSIS — I26.99 OTHER ACUTE PULMONARY EMBOLISM WITHOUT ACUTE COR PULMONALE (HCC): ICD-10-CM

## 2020-05-22 DIAGNOSIS — I82.412 DVT OF DEEP FEMORAL VEIN, LEFT (HCC): ICD-10-CM

## 2020-05-22 LAB — INR PPP: 2.6 (ref 2–3.5)

## 2020-05-22 PROCEDURE — 85610 PROTHROMBIN TIME: CPT

## 2020-05-22 PROCEDURE — 93971 EXTREMITY STUDY: CPT | Mod: LT

## 2020-05-22 PROCEDURE — 93971 EXTREMITY STUDY: CPT | Mod: 26 | Performed by: INTERNAL MEDICINE

## 2020-05-22 PROCEDURE — 99211 OFF/OP EST MAY X REQ PHY/QHP: CPT | Mod: 25

## 2020-05-22 NOTE — PROGRESS NOTES
Anticoagulation Summary  As of 2020    INR goal:   2.0-3.0   TTR:   74.1 % (6.2 mo)   INR used for dosin.60 (2020)   Warfarin maintenance plan:   10 mg (5 mg x 2) every day   Weekly warfarin total:   70 mg   Plan last modified:   Jean Carlos Car, PharmD (2019)   Next INR check:   2020   Target end date:       Indications    Acute pulmonary embolism (HCC) [I26.99]  DVT (deep venous thrombosis) (HCC) [I82.409]             Anticoagulation Episode Summary     INR check location:       Preferred lab:       Send INR reminders to:       Comments:   PE while on Eliquis after diagnosis of DVT      Anticoagulation Care Providers     Provider Role Specialty Phone number    [facility]   [phone]    Ayaan Cormier M.D.  Family Medicine [phone]        Anticoagulation Patient Findings      HPI:  Regina Summer Kraig seen in clinic today for follow up on anticoagulation therapy in the presence of DVT and PE.   Denies any changes to current medical/health status since last appointment.   Denies any medication or diet changes.   No current symptoms of bleeding or thrombosis reported.    A/P:   INR is therapeutic.   Continue current regimen.     Follow up appointment in 4 week(s).    Neelam Baever, Pharm.D

## 2020-06-19 ENCOUNTER — ANTICOAGULATION VISIT (OUTPATIENT)
Dept: VASCULAR LAB | Facility: MEDICAL CENTER | Age: 70
End: 2020-06-19
Attending: INTERNAL MEDICINE
Payer: MEDICARE

## 2020-06-19 DIAGNOSIS — I26.99 OTHER ACUTE PULMONARY EMBOLISM WITHOUT ACUTE COR PULMONALE (HCC): ICD-10-CM

## 2020-06-19 LAB — INR PPP: 1.8 (ref 2–3.5)

## 2020-06-19 PROCEDURE — 99212 OFFICE O/P EST SF 10 MIN: CPT | Performed by: PHARMACIST

## 2020-06-19 PROCEDURE — 85610 PROTHROMBIN TIME: CPT

## 2020-06-19 NOTE — PROGRESS NOTES
Anticoagulation Summary  As of 2020    INR goal:   2.0-3.0   TTR:   74.2 % (7.1 mo)   INR used for dosin.80! (2020)   Warfarin maintenance plan:   10 mg (5 mg x 2) every day   Weekly warfarin total:   70 mg   Plan last modified:   Jean Carlos Car, PharmD (2019)   Next INR check:   7/10/2020   Target end date:       Indications    Acute pulmonary embolism (HCC) [I26.99]  DVT (deep venous thrombosis) (HCC) [I82.409]             Anticoagulation Episode Summary     INR check location:       Preferred lab:       Send INR reminders to:       Comments:   PE while on Eliquis after diagnosis of DVT      Anticoagulation Care Providers     Provider Role Specialty Phone number    [facility]   [phone]    Ayaan Cormier M.D.  Family Medicine [phone]                Anticoagulation Patient Findings      HPI:  Regina Summer Kraig seen in clinic today, on anticoagulation therapy with warfarin for DVT and PE  Changes to current medical/health status since last appt: none  Denies signs/symptoms of bleeding and/or thrombosis since the last appt.    Denies any interval changes to diet  Denies any interval changes to medications since last appt.   Denies any complications or cost restrictions with current therapy.   Verified current warfarin dosing schedule.      A/P   INR  is slightly sub-therapeutic at 1.8  Patient is to bolus with 12.5 mg then continue on current regimen     Follow up appointment in 3 week(s).    Yissel Quiles, PharmD

## 2020-07-09 ENCOUNTER — ANTICOAGULATION VISIT (OUTPATIENT)
Dept: VASCULAR LAB | Facility: MEDICAL CENTER | Age: 70
End: 2020-07-09
Attending: INTERNAL MEDICINE
Payer: MEDICARE

## 2020-07-09 DIAGNOSIS — I26.99 OTHER ACUTE PULMONARY EMBOLISM WITHOUT ACUTE COR PULMONALE (HCC): Primary | ICD-10-CM

## 2020-07-09 LAB
INR BLD: 1.8 (ref 0.9–1.2)
INR PPP: 1.8 (ref 2–3.5)

## 2020-07-09 PROCEDURE — 99212 OFFICE O/P EST SF 10 MIN: CPT | Performed by: PHARMACIST

## 2020-07-09 PROCEDURE — 85610 PROTHROMBIN TIME: CPT

## 2020-07-09 RX ORDER — WARFARIN SODIUM 5 MG/1
10-15 TABLET ORAL DAILY
Qty: 235 TAB | Refills: 3 | Status: SHIPPED
Start: 2020-07-09 | End: 2021-02-19

## 2020-07-09 NOTE — PROGRESS NOTES
Anticoagulation Summary  As of 2020    INR goal:   2.0-3.0   TTR:   67.9 % (7.8 mo)   INR used for dosin.80! (2020)   Warfarin maintenance plan:   15 mg (5 mg x 3) every Thu; 10 mg (5 mg x 2) all other days   Weekly warfarin total:   75 mg   Plan last modified:   Yissel Quiles, PharmD (2020)   Next INR check:   2020   Target end date:       Indications    Acute pulmonary embolism (HCC) [I26.99]  DVT (deep venous thrombosis) (HCC) [I82.409]             Anticoagulation Episode Summary     INR check location:       Preferred lab:       Send INR reminders to:       Comments:   PE while on Eliquis after diagnosis of DVT      Anticoagulation Care Providers     Provider Role Specialty Phone number    [facility]   [phone]    Ayaan Cormier M.D.  Family Medicine [phone]                Anticoagulation Patient Findings      HPI:  Regina Cornell seen in clinic today, on anticoagulation therapy with warfarin for PE  Changes to current medical/health status since last appt: denies  Denies signs/symptoms of bleeding and/or thrombosis since the last appt.    Denies any interval changes to diet  Denies any interval changes to medications since last appt.   Denies any complications or cost restrictions with current therapy.   Verified current warfarin dosing schedule.   BP declined.      A/P   INR  is sub-therapeutic.   Will have pt start a 7% increased weekly warfarin dose.    Follow up appointment in 2 week(s).    Yissel Quiles, PharmD

## 2020-07-24 ENCOUNTER — ANTICOAGULATION VISIT (OUTPATIENT)
Dept: VASCULAR LAB | Facility: MEDICAL CENTER | Age: 70
End: 2020-07-24
Attending: INTERNAL MEDICINE
Payer: MEDICARE

## 2020-07-24 DIAGNOSIS — Z79.01 CHRONIC ANTICOAGULATION: ICD-10-CM

## 2020-07-24 LAB
INR BLD: 2.8 (ref 0.9–1.2)
INR PPP: 2.8 (ref 2–3.5)

## 2020-07-24 PROCEDURE — 99211 OFF/OP EST MAY X REQ PHY/QHP: CPT

## 2020-07-24 PROCEDURE — 85610 PROTHROMBIN TIME: CPT

## 2020-07-24 NOTE — PROGRESS NOTES
Anticoagulation Summary  As of 2020    INR goal:   2.0-3.0   TTR:   68.6 % (8.3 mo)   INR used for dosin.80 (2020)   Warfarin maintenance plan:   15 mg (5 mg x 3) every Thu; 10 mg (5 mg x 2) all other days   Weekly warfarin total:   75 mg   Plan last modified:   Karen RivasD (2020)   Next INR check:   2020   Target end date:       Indications    Acute pulmonary embolism (HCC) [I26.99]  DVT (deep venous thrombosis) (HCC) [I82.409]             Anticoagulation Episode Summary     INR check location:       Preferred lab:       Send INR reminders to:       Comments:   PE while on Eliquis after diagnosis of DVT      Anticoagulation Care Providers     Provider Role Specialty Phone number    [facility]   [phone]    Ayaan Cormier M.D.  Family Medicine [phone]        Anticoagulation Patient Findings      HPI:  Regina Alvarengaee Kraig seen in clinic today for follow up on anticoagulation therapy in the presence of PE and DVT.   Denies any changes to current medical/health status since last appointment.   Denies any medication or diet changes.   No current symptoms of bleeding or thrombosis reported.    A/P:   INR is therapeutic at 2.8. Patient took decreased dose yesterday as she was worried about INR being elevated today.   Patient is to take extra dose today as she missed it yesterday then is to continue current regimen.   Follow up appointment in 2 week(s).      Neelam Beaver, Karen.D

## 2020-08-07 ENCOUNTER — ANTICOAGULATION VISIT (OUTPATIENT)
Dept: VASCULAR LAB | Facility: MEDICAL CENTER | Age: 70
End: 2020-08-07
Attending: INTERNAL MEDICINE
Payer: MEDICARE

## 2020-08-07 DIAGNOSIS — Z79.01 CHRONIC ANTICOAGULATION: ICD-10-CM

## 2020-08-07 LAB
INR BLD: 4.9 (ref 0.9–1.2)
INR PPP: 4.9 (ref 2–3.5)

## 2020-08-07 PROCEDURE — 99212 OFFICE O/P EST SF 10 MIN: CPT

## 2020-08-07 PROCEDURE — 85610 PROTHROMBIN TIME: CPT

## 2020-08-07 NOTE — PROGRESS NOTES
Anticoagulation Summary  As of 2020    INR goal:  2.0-3.0   TTR:  65.4 % (8.8 mo)   INR used for dosin.90 (2020)   Warfarin maintenance plan:  10 mg (5 mg x 2) every day   Weekly warfarin total:  70 mg   Plan last modified:  Tarsha Herrera (2020)   Next INR check:  2020   Target end date:      Indications    Acute pulmonary embolism (HCC) [I26.99]  DVT (deep venous thrombosis) (HCC) [I82.409]             Anticoagulation Episode Summary     INR check location:      Preferred lab:      Send INR reminders to:      Comments:  PE while on Eliquis after diagnosis of DVT      Anticoagulation Care Providers     Provider Role Specialty Phone number    [facility]   [phone]    Ayaan Cormier M.D.  Family Medicine [phone]        Anticoagulation Patient Findings  Patient Findings     Negatives:  Signs/symptoms of thrombosis, Signs/symptoms of bleeding, Laboratory test error suspected, Change in health, Change in alcohol use, Change in activity, Upcoming invasive procedure, Emergency department visit, Upcoming dental procedure, Missed doses, Extra doses, Change in medications, Change in diet/appetite, Hospital admission, Bruising, Other complaints              History of Present Illness: follow up appointment for chronic anticoagulation with the high risk medication, warfarin for PE    Last INR was at goal, pt is now supra therapeutic today.  Will HOLD warfarin tonight, then reduce weekly dose to 70mg/week. Follow up in 2 weeks, to reduce the risk of adverse events related to this high risk medication, warfarin.  Pt declines vitals today      Tarsha Herrera, Clinical Pharmacist

## 2020-08-21 ENCOUNTER — ANTICOAGULATION VISIT (OUTPATIENT)
Dept: VASCULAR LAB | Facility: MEDICAL CENTER | Age: 70
End: 2020-08-21
Attending: INTERNAL MEDICINE
Payer: MEDICARE

## 2020-08-21 DIAGNOSIS — Z79.01 CHRONIC ANTICOAGULATION: ICD-10-CM

## 2020-08-21 LAB
INR BLD: 1.6 (ref 0.9–1.2)
INR PPP: 1.6 (ref 2–3.5)

## 2020-08-21 PROCEDURE — 99212 OFFICE O/P EST SF 10 MIN: CPT

## 2020-08-21 PROCEDURE — 85610 PROTHROMBIN TIME: CPT

## 2020-08-21 NOTE — PROGRESS NOTES
Anticoagulation Summary  As of 2020    INR goal:  2.0-3.0   TTR:  63.7 % (9.2 mo)   INR used for dosin.60 (2020)   Warfarin maintenance plan:  10 mg (5 mg x 2) every day   Weekly warfarin total:  70 mg   Plan last modified:  Tarsha SIMS Filter (2020)   Next INR check:  2020   Target end date:      Indications    Acute pulmonary embolism (HCC) [I26.99]  DVT (deep venous thrombosis) (HCC) [I82.409]             Anticoagulation Episode Summary     INR check location:      Preferred lab:      Send INR reminders to:      Comments:  PE while on Eliquis after diagnosis of DVT      Anticoagulation Care Providers     Provider Role Specialty Phone number    [facility]   [phone]    Ayaan Cormier M.D.  Family Medicine [phone]        Anticoagulation Patient Findings  Patient Findings     Positives:  Change in diet/appetite          HPI:  Regina Summer Kraig seen in clinic today for follow up on anticoagulation therapy in the presence of DVT and PE.   Denies any changes to current medical/health status since last appointment.   Denies any medication or diet changes.   No current symptoms of bleeding or thrombosis reported.    A/P:   INR is sub-therapeutic at 1.6. Patient states that she tried to eat more greens the past week as her last INR was elevated, she believes that she ate more than her usual servings. She states that she will decrease this back to her normal servings.    She is to bolus tonight with 15 mg then is to  continue current regimen.     Follow up appointment in 1 week(s).    Neelam Beaver, Pharm.D

## 2020-08-28 ENCOUNTER — APPOINTMENT (OUTPATIENT)
Dept: VASCULAR LAB | Facility: MEDICAL CENTER | Age: 70
End: 2020-08-28
Attending: INTERNAL MEDICINE
Payer: MEDICARE

## 2020-08-28 DIAGNOSIS — Z79.01 CHRONIC ANTICOAGULATION: ICD-10-CM

## 2020-08-28 LAB
INR BLD: 2.3 (ref 0.9–1.2)
INR PPP: 2.3 (ref 2–3.5)

## 2020-08-28 PROCEDURE — 99211 OFF/OP EST MAY X REQ PHY/QHP: CPT | Performed by: PHARMACIST

## 2020-08-28 PROCEDURE — 85610 PROTHROMBIN TIME: CPT

## 2020-08-28 NOTE — PROGRESS NOTES
Anticoagulation Summary  As of 2020    INR goal:  2.0-3.0   TTR:  63.2 % (9.5 mo)   INR used for dosin.30 (2020)   Warfarin maintenance plan:  10 mg (5 mg x 2) every day   Weekly warfarin total:  70 mg   Plan last modified:  Tarsha SIMS Filter (2020)   Next INR check:  2020   Target end date:      Indications    Acute pulmonary embolism (HCC) [I26.99]  DVT (deep venous thrombosis) (HCC) [I82.409]             Anticoagulation Episode Summary     INR check location:      Preferred lab:      Send INR reminders to:      Comments:  PE while on Eliquis after diagnosis of DVT      Anticoagulation Care Providers     Provider Role Specialty Phone number    [facility]   [phone]    Ayaan Cormier M.D.  Family Medicine [phone]                Anticoagulation Patient Findings      HPI:  Regina Wheeler Kraig seen in clinic today, on anticoagulation therapy with warfarin for PE  Changes to current medical/health status since last appt: denies  Denies signs/symptoms of bleeding and/or thrombosis since the last appt.    Denies any interval changes to diet  Denies any interval changes to medications since last appt.   Denies any complications or cost restrictions with current therapy.   Verified current warfarin dosing schedule.   BP declined      A/P   INR  is-therapeutic.   Discussed with pt, states that when she did 15mg once a week and 10mg all other days, INR jumped, so will have pt continue with warfarin 10mg daily.    Follow up appointment in 3 week(s).    Yissel Quiles, PharmD

## 2020-09-18 ENCOUNTER — ANTICOAGULATION VISIT (OUTPATIENT)
Dept: VASCULAR LAB | Facility: MEDICAL CENTER | Age: 70
End: 2020-09-18
Attending: INTERNAL MEDICINE
Payer: MEDICARE

## 2020-09-18 DIAGNOSIS — Z79.01 CHRONIC ANTICOAGULATION: ICD-10-CM

## 2020-09-18 LAB — INR PPP: 3.1 (ref 2–3.5)

## 2020-09-18 PROCEDURE — 85610 PROTHROMBIN TIME: CPT

## 2020-09-18 PROCEDURE — 99212 OFFICE O/P EST SF 10 MIN: CPT

## 2020-09-18 NOTE — PROGRESS NOTES
Anticoagulation Summary  As of 9/18/2020    INR goal:  2.0-3.0   TTR:  64.8 % (10.2 mo)   INR used for dosing:  3.10 (9/18/2020)   Warfarin maintenance plan:  10 mg (5 mg x 2) every day   Weekly warfarin total:  70 mg   Plan last modified:  Tarsha SIMS Filter (8/7/2020)   Next INR check:  10/2/2020   Target end date:      Indications    Acute pulmonary embolism (HCC) [I26.99]  DVT (deep venous thrombosis) (HCC) [I82.409]             Anticoagulation Episode Summary     INR check location:      Preferred lab:      Send INR reminders to:      Comments:  PE while on Eliquis after diagnosis of DVT      Anticoagulation Care Providers     Provider Role Specialty Phone number    [facility]   [phone]    Ayaan Cormier M.D.  Family Medicine [phone]        Anticoagulation Patient Findings      HPI:  Regina Wheeler Kraig seen in clinic today for follow up on anticoagulation therapy in the presence of DVT and hx of PE.   Denies any changes to current medical/health status since last appointment.   Denies any medication or diet changes.   No current symptoms of bleeding or thrombosis reported.    A/P:   INR is slightly supra-therapeutic at 3.1. Patient admits to not eating greens this past week. She plans on having having extra servings of green tonight hence patient is to continue on current regimen     Follow up appointment in 2 week(s).    Neelam Beaver, Pharm.D

## 2020-09-21 LAB — INR BLD: 3.1 (ref 0.9–1.2)

## 2020-10-02 ENCOUNTER — ANTICOAGULATION VISIT (OUTPATIENT)
Dept: VASCULAR LAB | Facility: MEDICAL CENTER | Age: 70
End: 2020-10-02
Attending: INTERNAL MEDICINE
Payer: MEDICARE

## 2020-10-02 DIAGNOSIS — Z79.01 CHRONIC ANTICOAGULATION: ICD-10-CM

## 2020-10-02 LAB — INR PPP: 2.2 (ref 2–3.5)

## 2020-10-02 PROCEDURE — 99211 OFF/OP EST MAY X REQ PHY/QHP: CPT

## 2020-10-02 PROCEDURE — 85610 PROTHROMBIN TIME: CPT

## 2020-10-02 NOTE — PROGRESS NOTES
Anticoagulation Summary  As of 10/2/2020    INR goal:  2.0-3.0   TTR:  65.9 % (10.6 mo)   INR used for dosin.20 (10/2/2020)   Warfarin maintenance plan:  10 mg (5 mg x 2) every day   Weekly warfarin total:  70 mg   Plan last modified:  Tarsha SIMS Filter (2020)   Next INR check:  10/23/2020   Target end date:      Indications    Acute pulmonary embolism (HCC) [I26.99]  DVT (deep venous thrombosis) (HCC) [I82.409]             Anticoagulation Episode Summary     INR check location:      Preferred lab:      Send INR reminders to:      Comments:  PE while on Eliquis after diagnosis of DVT      Anticoagulation Care Providers     Provider Role Specialty Phone number    [facility]   [phone]    Ayaan Cormier M.D.  Family Medicine [phone]        Anticoagulation Patient Findings      HPI:  Regina Wheeler Kraig seen in clinic today, on anticoagulation therapy with warfarin for hx of PE and DVT  Changes to current medical/health status since last appt: None  Denies signs/symptoms of bleeding and/or thrombosis since the last appt.    Denies any interval changes to diet.  Denies any interval changes to medications since last appt.   Denies any complications or cost restrictions with current therapy.   BP declined.    A/P   INR therapeutic.   Instructed pt to continue on with current regimen.    Follow up appointment in 3 week(s).    Srinivasan Sparks PharmD

## 2020-10-05 LAB — INR BLD: 2.2 (ref 0.9–1.2)

## 2020-10-05 NOTE — PROGRESS NOTES
Problem: Self Care Deficits Care Plan (Adult)  Goal: *Acute Goals and Plan of Care (Insert Text)  Description:   FUNCTIONAL STATUS PRIOR TO ADMISSION: Patient unable to provide history at OT evaluation. Per chart review, patient was fully independent    HOME SUPPORT: Per chart review, patient lived alone    Occupational Therapy Goals  ALL GOALS Reviewed and remain appropriate on 10/5/20  Initiated 9/14/2020, continued 9/21/2020, continued 9/28/2020  1. Patient will perform grooming with minimum assistance within 7 day(s). 2.  Patient will perform bathing with moderate assistance  within 7 day(s). 3.  Patient will perform upper body dressing with minimum assistance  within 7 day(s). 5.  Patient will perform lower body dressing with moderate assistance within 7 day(s). 6.  Patient will perform toilet transfers with moderate assistance  within 7 day(s). 7.  Patient will perform all aspects of toileting with moderate assistance  within 7 day(s). 8.  Patient will participate in upper extremity therapeutic exercise/activities with minimal assistance for 10 minutes within 7 day(s). 9.  Patient will utilize energy conservation techniques during functional activities with verbal cues within 7 day(s). Outcome: Progressing Towards Goal    OCCUPATIONAL THERAPY TREATMENT/WEEKLY RE-ASSESSMENT  Patient: Jack Miranda (89 y.o. male)  Date: 10/5/2020  Diagnosis: Acute respiratory failure ([facility] [address] [J96.00]   Pneumonia due to severe acute respiratory syndrome coronavirus 2 (SARS-CoV-2)  Procedure(s) (LRB):  ESOPHAGOGASTRODUODENOSCOPY (EGD) @ bedside (N/A)  ESOPHAGOGASTRODUODENAL (EGD) BIOPSY (N/A) 13 Days Post-Op  Precautions:  trach collar, peg, falls,   Chart, occupational therapy assessment, plan of care, and goals were reviewed. ASSESSMENT  Patient continues with skilled OT services and is progressing towards goals.   This patient is progressing nicely with improvements in trunk control EOB, new Subjective:      Regina Cornell is a 68 y.o. female who presents with Chest Pain (x2days )      Chest Pain    This is a new problem. The current episode started in the past 7 days (Started 2-3 days ago). The onset quality is gradual. The problem occurs constantly. The problem has been gradually worsening. The pain is present in the substernal region. The pain is moderate. The quality of the pain is described as heavy, tightness and pressure. The pain does not radiate. Associated symptoms include shortness of breath. Pertinent negatives include no abdominal pain, back pain, cough, diaphoresis, dizziness, fever, headaches, irregular heartbeat, lower extremity edema, nausea, palpitations, PND, vomiting or weakness. The pain is aggravated by nothing. She has tried nothing for the symptoms. Risk factors include being elderly (Patient had a knee on 10/16 and at her postop appointment she was found to have a DVT in her left lower extremity.  She was placed on Eliquis at that time.).   Her past medical history is significant for DVT.       Review of Systems   Constitutional: Negative for chills, diaphoresis and fever.   Eyes: Negative for blurred vision and pain.   Respiratory: Positive for shortness of breath. Negative for cough.    Cardiovascular: Positive for chest pain. Negative for palpitations and PND.   Gastrointestinal: Negative for abdominal pain, nausea and vomiting.   Musculoskeletal: Negative for back pain.   Neurological: Negative for dizziness, weakness and headaches.   Endo/Heme/Allergies: Bruises/bleeds easily (Patient on Eliquis).       PMH:  has a past medical history of Arthritis and Pain.  MEDS:   Current Outpatient Medications:   •  ELIQUIS STARTER PACK 5 MG Tab, , Disp: , Rfl:   •  ondansetron (ZOFRAN) 4 MG Tab tablet, Take 1 Tab by mouth every four hours as needed., Disp: 6 Tab, Rfl: 0  •  ibuprofen (MOTRIN) 200 MG Tab, Take 600 mg by mouth 2 times a day., Disp: , Rfl:   •  Non Formulary Request,  "Indications: suppliment for hot flashes  \"ESTRAVEN\", Disp: , Rfl:   •  Omega-3 Fatty Acids (EQL OMEGA 3 FISH OIL) 1200 MG Cap, Take  by mouth., Disp: , Rfl:   •  vitamin D3, cholecalciferol, 1000 UNIT Tab, Take 3 Tabs by mouth every day., Disp: 100 Tab, Rfl: 3  •  Cyanocobalamin (VITAMIN B 12 PO), Take 1,000 mcg by mouth every day., Disp: , Rfl:   •  Multiple Vitamin (MULTI-VITAMIN PO), Take  by mouth., Disp: , Rfl:   •  Calcium 200 MG Tab, Take 1,200 mg by mouth., Disp: , Rfl:   •  NON SPECIFIED, esratavan OTC, Disp: , Rfl:   •  VITAMIN E PO, Take 180 mg by mouth., Disp: , Rfl:   •  tetanus-dipth-acell pertussis (ADACEL) 5-2-15.5 LF-MCG/0.5 Suspension, 0.5 mL by Intramuscular route Once PRN (Give 1 Vial Tdap IM  Z23)., Disp: 1 Vial, Rfl: 0  ALLERGIES:   Allergies   Allergen Reactions   • Iodine      IV Contrast   Anaphylaxis   Topical is OK   • Penicillins      Rash     SURGHX:   Past Surgical History:   Procedure Laterality Date   • KNEE ARTHROSCOPY Left 10/16/2019    Procedure: ARTHROSCOPY, KNEE;  Surgeon: Bobo Booth M.D.;  Location: [facility];  Service: Orthopedics   • MEDIAL MENISCECTOMY Left 10/16/2019    Procedure: MENISCECTOMY, KNEE, MEDIAL- PARTIAL;  Surgeon: Bobo Booth M.D.;  Location: [facility];  Service: Orthopedics   • CHONDROPLASTY Left 10/16/2019    Procedure: CHONDROPLASTY- RAMIREZ;  Surgeon: Bobo Booth M.D.;  Location: [facility];  Service: Orthopedics   • ACL RECONSTRUCTION  2002    With patella tendon for repair   • ABDOMINAL HYSTERECTOMY TOTAL  1989    Precancerous Changes in Ovarian Cyst   • OVARIAN CYSTECTOMY  1988    left sided Precancerous Cyst---was pregnant at the time  Dr Mckeon   • APPENDECTOMY  1976   • TONSILLECTOMY AND ADENOIDECTOMY  1954     SOCHX:  reports that she quit smoking about 42 years ago. Her smoking use included cigarettes. She has never used smokeless tobacco. She reports current alcohol use. She reports that she " SPT today with RW with stabilization of R wrist on RW and no L knee blocking, improved OOB with bed mobility with min A, provided R hand AROm exercises and lined paper with cylindrical built up foam on pencil for handwriting exercises. EH continues to exhibit general weakness, L LE weakness, R UE weakness (>proximal than distal) affecting all tasks. Patient is eager and motivated, still requires significant A for ADL as session has been limited to EOB, donning socks, transfers, then some R UE exercise /handwriting as he requires rest breaks during these task for HR, RR, and deep breathing. Anticipate he will be able to tolerate participation in bathing/dressing tasks this week as cardiopulmonary status allows. Patient's goals all remain appropriate at this time. He will benefit from IP rehab when appropriate--he was I and working PTA with [employer]. Continue per POC. Current Level of Function Impacting Discharge (ADLs): D to mod A/min of 2 for transfers    Other factors to consider for discharge: was I PTA         PLAN :  Goals have been updated based on progression since last assessment. Patient continues to benefit from skilled intervention to address the above impairments. Continue to follow patient 5 times a week to address goals. Recommend with staff: up to chair for meals with RW    Recommend next OT session: ADL in chair (bathing/grooming)    Recommendation for discharge: (in order for the patient to meet his/her long term goals)  Therapy 3 hours per day 5-7 days per week    This discharge recommendation:  Has not yet been discussed the attending provider and/or case management    IF patient discharges home will need the following DME: TBD--defer to rehab       SUBJECTIVE:   Patient stated I feel like shit. I was vomiting. I feel like I am having withdrawals from some of the medicine they were giving me.     OBJECTIVE DATA SUMMARY:   Cognitive/Behavioral Status:  Neurologic State: "does not use drugs.  FH: Family history was reviewed, no pertinent findings to report     Objective:     /80   Pulse 74   Temp 36.1 °C (97 °F) (Temporal)   Resp 16   Ht 1.575 m (5' 2\")   Wt 68 kg (150 lb)   SpO2 98%   BMI 27.44 kg/m²      Physical Exam  Constitutional:       Appearance: She is well-developed.   HENT:      Head: Normocephalic and atraumatic.      Right Ear: External ear normal.      Left Ear: External ear normal.   Eyes:      Conjunctiva/sclera: Conjunctivae normal.      Pupils: Pupils are equal, round, and reactive to light.   Neck:      Musculoskeletal: Normal range of motion.   Cardiovascular:      Rate and Rhythm: Normal rate and regular rhythm.      Chest Wall: PMI is not displaced.      Pulses: Normal pulses.      Heart sounds: Normal heart sounds. No murmur.   Pulmonary:      Effort: Pulmonary effort is normal.      Breath sounds: Normal breath sounds. No wheezing.   Lymphadenopathy:      Cervical: No cervical adenopathy.   Skin:     General: Skin is warm and dry.      Capillary Refill: Capillary refill takes less than 2 seconds.   Neurological:      Mental Status: She is alert and oriented to person, place, and time.   Psychiatric:         Behavior: Behavior normal.         Judgment: Judgment normal.         EKG Interpretation -  Normal EKG, normal sinus rhythm, unchanged from previous tracings           Assessment/Plan:     1. Chest pain, unspecified type  - EKG - Clinic Performed    2. History of DVT (deep vein thrombosis)  - EKG - Clinic Performed        Patient has 2 to 3 days of substernal chest pain/pressure.  She describes it as a \"heaviness\" and says that it feels like something is sitting on her.  Recent diagnosis of left lower extremity DVT for which she was placed on Eliquis.  Patient needs to have further work-up in the ER to rule out cardiac cause or PE.  Patient was stable at time of discharge from the urgent care.  She understood and agreed with the plan.  " Alert  Orientation Level: Oriented X4  Cognition: Appropriate for age attention/concentration             Functional Mobility and Transfers for ADLs:  Bed Mobility:  Supine to Sit: Minimum assistance    Transfers:  Sit to Stand: Minimum assistance; Moderate assistance;Assist x2; Additional time     Bed to Chair: Assist x2; Additional time; Moderate assistance;Minimum assistance; Other (comment)(with RW today! ! )    Balance:  Sitting: Impaired; Without support  Sitting - Static: Fair (occasional)  Sitting - Dynamic: Poor (constant support)  Standing: Impaired;Pull to stand; With support  Standing - Static: Constant support; Fair  Standing - Dynamic : Constant support;Poor    ADL Intervention:                           Lower Body Dressing Assistance  Socks: Moderate assistance(x 2 EOB, A for trunk support in addition to sock)  Leg Crossed Method Used: Yes  Position Performed: Seated edge of bed  Cues: Don;Physical assistance; Tactile cues provided;Verbal cues provided;Visual cues provided              Therapeutic Exercises:     EXERCISE   Sets   Reps   Active Active Assist   Passive   Comments   Shoulder flexion 1 5 []           [x]           []           Max A   Elbow flexion with R hand to mouth 3 1 []           [x]           []           Min-mod A, varies   R forearm supination/pronation 1 10 []           []           []              R wrist extension 1 10 X           []           []              Active hand 6 pack exercises 1 3 [x]           []           []           Issued handout and reviewed with patient--ed on 30 sec hold for hook finger stretch   handwriting   x           []           []           Wrote \"Zheng\" with increased time and micrographia--issued lined paper--OT to bring cylindrical foam to increase  on pencil today.      []           []           []                 []           []           []                 []           []           []                 []           []           []                 [] []           []                   Pain:  C/o feeling crummy    Activity Tolerance:   Fair, desaturates with exertion and requires oxygen, requires frequent rest breaks, and tachycardia into 120's and RR into 30's intermittently requiring rest breaks during session  Please refer to the flowsheet for vital signs taken during this treatment. After treatment patient left in no apparent distress:   Sitting in chair and Call bell within reach    COMMUNICATION/COLLABORATION:   The patients plan of care was discussed with: Physical therapist and Registered nurse.      Hedwig Libman, OTR/L  Time Calculation: 46 mins

## 2020-10-22 DIAGNOSIS — Z79.01 CHRONIC ANTICOAGULATION: ICD-10-CM

## 2020-10-23 ENCOUNTER — ANTICOAGULATION VISIT (OUTPATIENT)
Dept: VASCULAR LAB | Facility: MEDICAL CENTER | Age: 70
End: 2020-10-23
Attending: INTERNAL MEDICINE
Payer: MEDICARE

## 2020-10-23 DIAGNOSIS — Z79.01 CHRONIC ANTICOAGULATION: ICD-10-CM

## 2020-10-23 LAB — INR PPP: 2.6 (ref 2–3.5)

## 2020-10-23 PROCEDURE — 85610 PROTHROMBIN TIME: CPT

## 2020-10-23 PROCEDURE — 99211 OFF/OP EST MAY X REQ PHY/QHP: CPT

## 2020-10-23 NOTE — PROGRESS NOTES
Anticoagulation Summary  As of 10/23/2020    INR goal:  2.0-3.0   TTR:  68.0 % (11.3 mo)   INR used for dosin.60 (10/23/2020)   Warfarin maintenance plan:  10 mg (5 mg x 2) every day   Weekly warfarin total:  70 mg   Plan last modified:  Tarsha Herrera (2020)   Next INR check:  2020   Target end date:      Indications    Acute pulmonary embolism (HCC) [I26.99]  DVT (deep venous thrombosis) (HCC) [I82.409]             Anticoagulation Episode Summary     INR check location:      Preferred lab:      Send INR reminders to:      Comments:  PE while on Eliquis after diagnosis of DVT      Anticoagulation Care Providers     Provider Role Specialty Phone number    [facility]   [phone]    Ayaan Cormier M.D.  Family Medicine [phone]        Anticoagulation Patient Findings          History of Present Illness: follow up appointment for chronic anticoagulation with the high risk medication, warfarin for DVT    Pt remains therapeutic today. Continue current dosing regimen.  Follow up in 4 weeks, to reduce the risk of adverse events related to this high risk medication, warfarin.    Pt declines vitals today    Tarsha Herrera, Clinical Pharmacist

## 2020-10-24 ENCOUNTER — IMMUNIZATION (OUTPATIENT)
Dept: SOCIAL WORK | Facility: CLINIC | Age: 70
End: 2020-10-24
Payer: MEDICARE

## 2020-10-24 DIAGNOSIS — Z23 NEED FOR VACCINATION: Primary | ICD-10-CM

## 2020-10-24 PROCEDURE — G0008 ADMIN INFLUENZA VIRUS VAC: HCPCS | Performed by: REGISTERED NURSE

## 2020-10-24 PROCEDURE — 90662 IIV NO PRSV INCREASED AG IM: CPT | Performed by: REGISTERED NURSE

## 2020-11-20 ENCOUNTER — ANTICOAGULATION VISIT (OUTPATIENT)
Dept: VASCULAR LAB | Facility: MEDICAL CENTER | Age: 70
End: 2020-11-20
Attending: INTERNAL MEDICINE
Payer: MEDICARE

## 2020-11-20 DIAGNOSIS — Z79.01 CHRONIC ANTICOAGULATION: ICD-10-CM

## 2020-11-20 LAB
INR BLD: 3.8 (ref 0.9–1.2)
INR PPP: 3.8 (ref 2–3.5)

## 2020-11-20 PROCEDURE — 99212 OFFICE O/P EST SF 10 MIN: CPT

## 2020-11-20 PROCEDURE — 85610 PROTHROMBIN TIME: CPT

## 2020-11-20 NOTE — PROGRESS NOTES
Anticoagulation Summary  As of 11/20/2020    INR goal:  2.0-3.0   TTR:  65.4 % (1 y)   INR used for dosing:  3.80 (11/20/2020)   Warfarin maintenance plan:  10 mg (5 mg x 2) every day   Weekly warfarin total:  70 mg   Plan last modified:  Tarsha Herrera (8/7/2020)   Next INR check:  12/4/2020   Target end date:      Indications    Acute pulmonary embolism (HCC) [I26.99]  DVT (deep venous thrombosis) (HCC) [I82.409]             Anticoagulation Episode Summary     INR check location:      Preferred lab:      Send INR reminders to:      Comments:  PE while on Eliquis after diagnosis of DVT      Anticoagulation Care Providers     Provider Role Specialty Phone number    [facility]   [phone]    Ayaan Cormier M.D.  Family Medicine [phone]        Anticoagulation Patient Findings  Patient Findings     Negatives:  Signs/symptoms of thrombosis, Signs/symptoms of bleeding, Laboratory test error suspected, Change in health, Change in alcohol use, Change in activity, Upcoming invasive procedure, Emergency department visit, Upcoming dental procedure, Missed doses, Extra doses, Change in medications, Change in diet/appetite, Hospital admission, Bruising, Other complaints              History of Present Illness: follow up appointment for chronic anticoagulation with the high risk medication, warfarin for PE    Last INR was at goal, pt is now supra therapeutic today.  Pt did enjoy ETOH last night.  Will HOLD dose tonight, then resume current dosing regimen. Follow up in 2 weeks, to reduce the risk of adverse events related to this high risk medication, warfarin.    Pt declines vitals today    Tarsha Herrera, Clinical Pharmacist

## 2020-12-03 ENCOUNTER — ANTICOAGULATION VISIT (OUTPATIENT)
Dept: VASCULAR LAB | Facility: MEDICAL CENTER | Age: 70
End: 2020-12-03
Attending: INTERNAL MEDICINE
Payer: MEDICARE

## 2020-12-03 DIAGNOSIS — Z79.01 CHRONIC ANTICOAGULATION: ICD-10-CM

## 2020-12-03 LAB — INR PPP: 1.2 (ref 2–3.5)

## 2020-12-03 PROCEDURE — 99212 OFFICE O/P EST SF 10 MIN: CPT

## 2020-12-03 PROCEDURE — 85610 PROTHROMBIN TIME: CPT

## 2020-12-03 NOTE — PROGRESS NOTES
Anticoagulation Summary  As of 12/3/2020    INR goal:  2.0-3.0   TTR:  64.4 % (1 y)   INR used for dosin.20 (12/3/2020)   Warfarin maintenance plan:  10 mg (5 mg x 2) every day   Weekly warfarin total:  70 mg   Plan last modified:  Tarsha SIMS Filter (2020)   Next INR check:  12/10/2020   Target end date:      Indications    Acute pulmonary embolism (HCC) [I26.99]  DVT (deep venous thrombosis) (HCC) [I82.409]             Anticoagulation Episode Summary     INR check location:      Preferred lab:      Send INR reminders to:      Comments:  PE while on Eliquis after diagnosis of DVT      Anticoagulation Care Providers     Provider Role Specialty Phone number    [facility]   [phone]    Ayaan Cormier M.D.  Family Medicine [phone]        Anticoagulation Patient Findings      HPI:  Regina Cornell seen in clinic today, on anticoagulation therapy with warfarin for hx of DVT/PE  Changes to current medical/health status since last appt: None  Denies signs/symptoms of bleeding and/or thrombosis since the last appt.    Pt states she's been eating inconsistent amounts of veggies and less EtOH.  Denies any interval changes to medications since last appt.   Denies any complications or cost restrictions with current therapy.   BP declined.    A/P   INR  SUB-therapeutic.   Instructed pt to bolus today with 15 mg and tomorrow w/ 12.5 mg and to then continue on with her current regimen.   Counseled pt on diet and EtOH consistency as she states she has been sporadic w/ both - pt plans to get back on track w/ her consistency.    Follow up appointment in 1 week(s) per pt.    Srinivasan Sparks, KarenD

## 2020-12-04 ENCOUNTER — APPOINTMENT (OUTPATIENT)
Dept: VASCULAR LAB | Facility: MEDICAL CENTER | Age: 70
End: 2020-12-04
Payer: MEDICARE

## 2020-12-09 LAB — INR BLD: 1.2 (ref 0.9–1.2)

## 2020-12-10 ENCOUNTER — ANTICOAGULATION VISIT (OUTPATIENT)
Dept: VASCULAR LAB | Facility: MEDICAL CENTER | Age: 70
End: 2020-12-10
Attending: INTERNAL MEDICINE
Payer: MEDICARE

## 2020-12-10 DIAGNOSIS — Z79.01 CHRONIC ANTICOAGULATION: ICD-10-CM

## 2020-12-10 LAB — INR PPP: 3 (ref 2–3.5)

## 2020-12-10 PROCEDURE — 85610 PROTHROMBIN TIME: CPT

## 2020-12-10 PROCEDURE — 99211 OFF/OP EST MAY X REQ PHY/QHP: CPT

## 2020-12-10 NOTE — PROGRESS NOTES
Anticoagulation Summary  As of 12/10/2020    INR goal:  2.0-3.0   TTR:  64.3 % (1.1 y)   INR used for dosing:  3.00 (12/10/2020)   Warfarin maintenance plan:  10 mg (5 mg x 2) every day   Weekly warfarin total:  70 mg   Plan last modified:  Tarsha SIMS Filter (8/7/2020)   Next INR check:  12/23/2020   Target end date:      Indications    Acute pulmonary embolism (HCC) [I26.99]  DVT (deep venous thrombosis) (HCC) [I82.409]             Anticoagulation Episode Summary     INR check location:      Preferred lab:      Send INR reminders to:      Comments:  PE while on Eliquis after diagnosis of DVT      Anticoagulation Care Providers     Provider Role Specialty Phone number    [facility]   [phone]    Ayaan Cormier M.D.  Family Medicine [phone]                Anticoagulation Patient Findings  Patient Findings     Negatives:  Signs/symptoms of thrombosis, Signs/symptoms of bleeding, Laboratory test error suspected, Change in health, Change in alcohol use, Change in activity, Upcoming invasive procedure, Emergency department visit, Upcoming dental procedure, Missed doses, Extra doses, Change in medications, Change in diet/appetite, Hospital admission, Bruising, Other complaints          HPI:  Regina Cornell seen in clinic today, on anticoagulation therapy with warfarin for PE/DVT  Changes to current medical/health status since last appt: none  Denies signs/symptoms of bleeding and/or thrombosis since the last appt.    Denies any interval changes to diet  Denies any interval changes to medications since last appt.   Denies any complications or cost restrictions with current therapy.   Verified current warfarin dosing schedule.   BP declined  Medications reconciled       A/P   INR  therapeutic.   Pt is to continue with current warfarin dosing regimen.    Upon chart review after this visit, there was no LOT date documented. Per Dr. Bloch's note on 11/2019, 6 months of therapy was  "recommended. Per imaging results ordered by PCP Dr Cormier (now retired) on 5/22/2020: \"Your Ultrasound shows partial opening of the clot behind your left knee and continued clot in the vein highed up. Best to stay on the blood thinner--at least another 3 months, I'd say\"    Will need to address LOT at next visit.    Follow up appointment in 2 week(s).    Arianne Mata, PharmD          "

## 2020-12-11 LAB — INR BLD: 3 (ref 0.9–1.2)

## 2020-12-23 ENCOUNTER — ANTICOAGULATION VISIT (OUTPATIENT)
Dept: VASCULAR LAB | Facility: MEDICAL CENTER | Age: 70
End: 2020-12-23
Attending: INTERNAL MEDICINE
Payer: MEDICARE

## 2020-12-23 DIAGNOSIS — I82.4Y9 DEEP VEIN THROMBOSIS (DVT) OF PROXIMAL LOWER EXTREMITY, UNSPECIFIED CHRONICITY, UNSPECIFIED LATERALITY (HCC): ICD-10-CM

## 2020-12-23 DIAGNOSIS — I26.99 OTHER ACUTE PULMONARY EMBOLISM WITHOUT ACUTE COR PULMONALE (HCC): ICD-10-CM

## 2020-12-23 LAB
INR BLD: 2.9 (ref 0.9–1.2)
INR PPP: 2.9 (ref 2–3.5)

## 2020-12-23 PROCEDURE — 85610 PROTHROMBIN TIME: CPT

## 2020-12-23 PROCEDURE — 99211 OFF/OP EST MAY X REQ PHY/QHP: CPT | Performed by: NURSE PRACTITIONER

## 2020-12-23 NOTE — Clinical Note
Hi,   I tried to schedule this pt for LOT with me. She feels strongly about seeing a vascular physician. Soonest opening I could find is for Dr Chapman 2/4/21.  Thanks,  Daly

## 2020-12-23 NOTE — PROGRESS NOTES
Anticoagulation Summary  As of 2020    INR goal:  2.0-3.0   TTR:  65.4 % (1.1 y)   INR used for dosin.90 (2020)   Warfarin maintenance plan:  10 mg (5 mg x 2) every day   Weekly warfarin total:  70 mg   Plan last modified:  Tarsha SIMS Filter (2020)   Next INR check:  2021   Target end date:      Indications    Acute pulmonary embolism (HCC) [I26.99]  DVT (deep venous thrombosis) (HCC) [I82.409]             Anticoagulation Episode Summary     INR check location:      Preferred lab:      Send INR reminders to:      Comments:  PE while on Eliquis after diagnosis of DVT      Anticoagulation Care Providers     Provider Role Specialty Phone number    [facility]   [phone]    Ayaan Cormier M.D.  Family Medicine [phone]        Anticoagulation Patient Findings      HPI:  Regina Wheeler Kraig seen in clinic today for follow up on anticoagulation therapy in the presence of DVT, PE hx.   Denies any changes to current medical/health status since last appointment.   Denies any medication or diet changes.   No current symptoms of bleeding or thrombosis reported.    Pt on antiplatelet therapy - none    Discussion with patient about LOT. She states her PCP recommended repeat chest CTA in May/ but due to pandemic, patient did not feel comfortable. She wants to discuss LOT with a vascular physician. Offered [facility] LOT visit but she declines. Will schedule with Dr Ari apodaca opening . Patient feels comfortable staying on warfarin until then.    A/P:   INR therapeutic.   Continue current regimen.   BP declined.    Follow up appointment in 3 week(s).    Next Appointment:  at 8:45 am.    Daly LARSEN

## 2021-01-14 ENCOUNTER — ANTICOAGULATION VISIT (OUTPATIENT)
Dept: VASCULAR LAB | Facility: MEDICAL CENTER | Age: 71
End: 2021-01-14
Attending: INTERNAL MEDICINE
Payer: MEDICARE

## 2021-01-14 DIAGNOSIS — I82.4Y9 DEEP VEIN THROMBOSIS (DVT) OF PROXIMAL LOWER EXTREMITY, UNSPECIFIED CHRONICITY, UNSPECIFIED LATERALITY (HCC): ICD-10-CM

## 2021-01-14 DIAGNOSIS — I26.99 OTHER ACUTE PULMONARY EMBOLISM WITHOUT ACUTE COR PULMONALE (HCC): ICD-10-CM

## 2021-01-14 LAB
INR BLD: 2.8 (ref 0.9–1.2)
INR PPP: 2.8 (ref 2–3.5)

## 2021-01-14 PROCEDURE — 85610 PROTHROMBIN TIME: CPT

## 2021-01-14 PROCEDURE — 99211 OFF/OP EST MAY X REQ PHY/QHP: CPT | Performed by: NURSE PRACTITIONER

## 2021-01-14 NOTE — PROGRESS NOTES
Anticoagulation Summary  As of 2021    INR goal:  2.0-3.0   TTR:  67.2 % (1.2 y)   INR used for dosin.80 (2021)   Warfarin maintenance plan:  10 mg (5 mg x 2) every day   Weekly warfarin total:  70 mg   Plan last modified:  Tarsha SIMS Filter (2020)   Next INR check:     Target end date:      Indications    Acute pulmonary embolism (HCC) [I26.99]  DVT (deep venous thrombosis) (HCC) [I82.409]             Anticoagulation Episode Summary     INR check location:      Preferred lab:      Send INR reminders to:      Comments:  PE while on Eliquis after diagnosis of DVT      Anticoagulation Care Providers     Provider Role Specialty Phone number    [facility]   [phone]    Ayaan Cormier M.D.  Family Medicine [phone]        Anticoagulation Patient Findings      HPI:  Regina Summer Kraig seen in clinic today for follow up on anticoagulation therapy in the presence of DVT, PE hx.   Denies any changes to current medical/health status since last appointment.   Denies any medication or diet changes.   No current symptoms of bleeding or thrombosis reported.    Pt on antiplatelet therapy - none.    A/P:   INR therapeutic.   Continue current regimen.   BP recorded in vitals.    Follow up appointment in 3 week(s) at vascular visit with Dr Chapman.    Daly LARSEN

## 2021-02-04 ENCOUNTER — OFFICE VISIT (OUTPATIENT)
Dept: VASCULAR LAB | Facility: MEDICAL CENTER | Age: 71
End: 2021-02-04
Attending: FAMILY MEDICINE
Payer: MEDICARE

## 2021-02-04 VITALS
HEART RATE: 54 BPM | DIASTOLIC BLOOD PRESSURE: 68 MMHG | HEIGHT: 62 IN | WEIGHT: 172.8 LBS | SYSTOLIC BLOOD PRESSURE: 140 MMHG | BODY MASS INDEX: 31.8 KG/M2

## 2021-02-04 DIAGNOSIS — Z11.59 SCREENING FOR VIRAL DISEASE: ICD-10-CM

## 2021-02-04 DIAGNOSIS — I26.99 OTHER ACUTE PULMONARY EMBOLISM WITHOUT ACUTE COR PULMONALE (HCC): ICD-10-CM

## 2021-02-04 DIAGNOSIS — R07.89 OTHER CHEST PAIN: ICD-10-CM

## 2021-02-04 DIAGNOSIS — Z79.01 CHRONIC ANTICOAGULATION: ICD-10-CM

## 2021-02-04 DIAGNOSIS — I83.892 VARICOSE VEINS OF LEG WITH SWELLING, LEFT: ICD-10-CM

## 2021-02-04 DIAGNOSIS — I51.7 LEFT VENTRICULAR HYPERTROPHY BY ELECTROCARDIOGRAM: ICD-10-CM

## 2021-02-04 DIAGNOSIS — E78.5 DYSLIPIDEMIA: ICD-10-CM

## 2021-02-04 DIAGNOSIS — I82.4Y9 DEEP VEIN THROMBOSIS (DVT) OF PROXIMAL LOWER EXTREMITY, UNSPECIFIED CHRONICITY, UNSPECIFIED LATERALITY (HCC): ICD-10-CM

## 2021-02-04 DIAGNOSIS — Z86.718 HISTORY OF DVT (DEEP VEIN THROMBOSIS): ICD-10-CM

## 2021-02-04 DIAGNOSIS — Z86.711 HISTORY OF PULMONARY EMBOLUS (PE): ICD-10-CM

## 2021-02-04 DIAGNOSIS — I77.810 MILD DILATION OF ASCENDING AORTA (HCC): ICD-10-CM

## 2021-02-04 DIAGNOSIS — R91.8 PULMONARY NODULES: ICD-10-CM

## 2021-02-04 LAB — INR PPP: 2.8 (ref 2–3.5)

## 2021-02-04 PROCEDURE — 99212 OFFICE O/P EST SF 10 MIN: CPT

## 2021-02-04 PROCEDURE — 85610 PROTHROMBIN TIME: CPT | Performed by: FAMILY MEDICINE

## 2021-02-04 PROCEDURE — 99204 OFFICE O/P NEW MOD 45 MIN: CPT | Performed by: FAMILY MEDICINE

## 2021-02-04 ASSESSMENT — ENCOUNTER SYMPTOMS
SEIZURES: 0
DIZZINESS: 0
HEMOPTYSIS: 0
WHEEZING: 0
SHORTNESS OF BREATH: 1
HEADACHES: 0
FEVER: 0
ABDOMINAL PAIN: 0
CHILLS: 0
BRUISES/BLEEDS EASILY: 0
MYALGIAS: 0
NAUSEA: 0
DIARRHEA: 0
FOCAL WEAKNESS: 0
PALPITATIONS: 0
WEAKNESS: 0
VOMITING: 0
TREMORS: 0
SPUTUM PRODUCTION: 0
CLAUDICATION: 0
COUGH: 1

## 2021-02-04 ASSESSMENT — FIBROSIS 4 INDEX: FIB4 SCORE: 0.87

## 2021-02-04 NOTE — PROGRESS NOTES
INITIAL VASCULAR ANTICOAGULATION VISIT  Subjective:   Regina Cornell is a 70 y.o. female who presents today 20 for   Chief Complaint   Patient presents with   • Office Visit     initial- LOT, PE/DVT     Initially referred by Sia Davis MD for length of therapy (LOT) determination and management of anticoagulation in context of acute venothromboembolic disease    HPI:    VTE disease / Anticoagulation:   Current symptoms:  Reports ongoing mid-sternal CP and intermittent cough with SOB that is not associated with exertion since original dx with PE in 2019.  Has not improved.  No prior VQ or repeat CTPA.  Echo normal but showed mild RVSP elevation in past, no recent CXR.  No sputum.  No functional stress testing.  No restrictions on ADLs.  Current antithrombotic agent: VKA, managed by [facility] clinic   Complications: none, tolerating well, no bleeding or bruising   Date of initiation of anticoagulation: 2019  Adherence: reports complete, no missed doses    As of 2021    INR goal:  2.0-3.0   TTR:  67.2 % (1.2 y)   INR used for dosin.80 (2021)   Warfarin maintenance plan:  10 mg (5 mg x 2) every day   Weekly warfarin total:  70 mg   Plan last modified:  Tarsha M Filter (2020)   Next INR check:     Target end date:      Indications    Acute pulmonary embolism (HCC) [I26.99]  DVT (deep venous thrombosis) (HCC) [I82.409]            Pertinent VTE pmhx:   Date of Diagnosis: cannot recall exact date 10/30/19  Type of Venous thromboembolic disease (VTE): acute LLE DVT  Type of imaging: duplex, CTPA - repeated 2020  Preceding/presenting symptoms: LLE swelling during post-op period after knee surgery - approximately 2 weeks postop had initial duplex, started on eliquis and, despite compliant therapy, then developed acute SOB, CP and found to have PE, switched to VKA as deemed eliquis failure   Antithrombotic therapy at time of VTE event: no  VTE tx course: initiated on Eliquis but then noted to have  new onset SOB with CTPA showing possible new PE, transitioned to VKA since   Any personal VTE hx? No  Any family VTE hx? No    UNPROVOKED VS PROVOKED:   Recent surgery ? Yes, Details: 10/16/20 - L knee athroscopy with Dr. Booth  Recent trauma ? No  Smoker?  reports that she quit smoking about 44 years ago. Her smoking use included cigarettes. She smoked 0.00 packs per day. She has never used smokeless tobacco.    Extended travel? No  Other periods of immobility? Yes, Details: post-op   Other risk factors for VTE disease:  no  WOMEN: Colorectal CA screening: yes       Cervical CA screening: yes       Breast CA screening: yes       Any estrogen, testosterone HRT, E2 birth control, tamoxifene, raloxifene: no       Hx of recurrent miscarriages: no  Hypercoaguability work-up completed? no    Hyperlipidemia:   No current meds   Current treatment: none   Myalgias? Not applicable  Other adverse drug reactions? Not applicable  Last lipid profile: reviewed with patient, as noted below   Hx of clinical ASCVD events: None     HTN:  Current HTN concerns: No specific concerns since last visit   ADRs: No  HTN sx:  No current blurred or changed vision, chest pain, shortness of breath, headache, nausea, dizziness/vertigo   Home BP log: does not check routinely   24h ABPM completed: not tested   Adherence to current HTN meds: compliant all of the time      Thoracic Aortic Aneurysm  Type: ascending   Current size: 4.0cm per echo in 2019  Current/interval concerns: none   Current sx: denies chest, back, abdominal pain, SOB, dysphagia, worsening cough, hemoptysis.    Past Medical History:   Diagnosis Date   • Acute deep vein thrombosis (DVT) of left lower extremity (HCC) 11/2019   • Arthritis     knees   • Ascending aorta dilation (HCC)    • Pain     knees   • Pulmonary embolism (HCC) 11/2019     Past Surgical History:   Procedure Laterality Date   • KNEE ARTHROSCOPY Left 10/16/2019    Procedure: ARTHROSCOPY, KNEE;  Surgeon: Bobo ROB  DEENA Booth;  Location: [facility];  Service: Orthopedics   • MEDIAL MENISCECTOMY Left 10/16/2019    Procedure: MENISCECTOMY, KNEE, MEDIAL- PARTIAL;  Surgeon: Bobo Booth M.D.;  Location: [facility];  Service: Orthopedics   • CHONDROPLASTY Left 10/16/2019    Procedure: CHONDROPLASTY- RAMIREZ;  Surgeon: Bobo Booth M.D.;  Location: [facility];  Service: Orthopedics   • ACL RECONSTRUCTION      With patella tendon for repair   • ABDOMINAL HYSTERECTOMY TOTAL      Precancerous Changes in Ovarian Cyst   • OVARIAN CYSTECTOMY      left sided Precancerous Cyst---was pregnant at the time  Dr Mckeon   • APPENDECTOMY     • TONSILLECTOMY AND ADENOIDECTOMY         Current Outpatient Medications:   •  warfarin, 10-15 mg, Oral, DAILY AT 1800, Taking     Allergies   Allergen Reactions   • Iodine      IV Contrast   Anaphylaxis   Topical is OK   • Penicillins      Rash     Family History   Problem Relation Age of Onset   • Cancer Brother         lymphoma   • Other Brother         VTE    • Cancer Paternal Grandfather         glioblastoma     Social History     Tobacco Use   • Smoking status: Former Smoker     Packs/day: 0.00     Types: Cigarettes     Quit date:      Years since quittin.1   • Smokeless tobacco: Never Used   Substance Use Topics   • Alcohol use: Yes     Drinks per session: 3 or 4     Binge frequency: Weekly     Comment: occasional   • Drug use: No     DIET AND EXERCISE:  Weight Change:stable   BMI Readings from Last 5 Encounters:   21 31.61 kg/m²   20 29.26 kg/m²   20 31.09 kg/m²   19 29.45 kg/m²   19 29.07 kg/m²     Diet: common adult  Exercise: no regular exercise program     Review of Systems   Constitutional: Negative for chills, fever and malaise/fatigue.   Respiratory: Positive for cough and shortness of breath (chronic since  - not worsening ). Negative for hemoptysis, sputum production and wheezing.   "  Cardiovascular: Positive for chest pain (chronic since PE in 2019). Negative for palpitations, claudication and leg swelling.   Gastrointestinal: Negative for abdominal pain, diarrhea, nausea and vomiting.   Musculoskeletal: Negative for joint pain and myalgias.   Skin: Negative for itching and rash.   Neurological: Negative for dizziness, tremors, focal weakness, seizures, weakness and headaches.   Endo/Heme/Allergies: Does not bruise/bleed easily.        Objective:     Vitals:    02/04/21 0827 02/04/21 0831   BP: 142/68 140/68   BP Location: Left arm Left arm   Patient Position: Sitting Sitting   BP Cuff Size: Adult Adult   Pulse: (!) 56 (!) 54   Weight: 78.4 kg (172 lb 12.8 oz)    Height: 1.575 m (5' 2\")       BP Readings from Last 5 Encounters:   02/04/21 140/68   05/01/20 142/87   02/18/20 142/82   12/05/19 138/77   11/14/19 130/78      Body mass index is 31.61 kg/m².     Physical Exam  Vitals signs reviewed.   Constitutional:       General: She is not in acute distress.     Appearance: Normal appearance.   HENT:      Head: Normocephalic and atraumatic.      Nose: Nose normal.      Mouth/Throat:      Mouth: Mucous membranes are dry.      Pharynx: Oropharynx is clear.   Eyes:      General: Lids are normal.      Extraocular Movements: Extraocular movements intact.      Conjunctiva/sclera: Conjunctivae normal.   Neck:      Musculoskeletal: Full passive range of motion without pain and neck supple.      Thyroid: No thyroid mass.      Vascular: Normal carotid pulses. No carotid bruit.      Trachea: Trachea normal.   Cardiovascular:      Rate and Rhythm: Normal rate and regular rhythm.      Chest Wall: PMI is not displaced.      Pulses: Normal pulses.           Carotid pulses are 2+ on the right side and 2+ on the left side.       Radial pulses are 2+ on the right side and 2+ on the left side.        Dorsalis pedis pulses are 2+ on the right side and 2+ on the left side.        Posterior tibial pulses are 2+ on the " right side and 2+ on the left side.      Heart sounds: Normal heart sounds.      Comments:    Spider telangectasia:       RLE:  None      LLE: none   Varicosities:           RLE: none      LLE: none   Corona phlebectatica:      RLE:  None        LLE:  None   Cording:         RLE:  None     LLE: None     Pulmonary:      Effort: Pulmonary effort is normal.      Breath sounds: Normal breath sounds.   Abdominal:      General: Abdomen is flat. Bowel sounds are normal.      Palpations: Abdomen is soft.   Musculoskeletal:      Right lower leg: No edema.      Left lower leg: No edema.   Skin:     General: Skin is warm and dry.      Capillary Refill: Capillary refill takes less than 2 seconds.      Coloration: Skin is not cyanotic.      Nails: There is no clubbing.     Neurological:      General: No focal deficit present.      Mental Status: She is alert and oriented to person, place, and time. Mental status is at baseline.      Cranial Nerves: Cranial nerves are intact.      Coordination: Coordination is intact.      Gait: Gait is intact.   Psychiatric:         Mood and Affect: Mood normal.         Behavior: Behavior normal.       Lab Results   Component Value Date/Time    CHOLSTRLTOT 209 (H) 03/26/2018 09:04 AM     (H) 03/26/2018 09:04 AM    HDL 71 03/26/2018 09:04 AM    TRIGLYCERIDE 48 03/26/2018 09:04 AM       Lab Results   Component Value Date/Time    SODIUM 137 11/05/2019 06:14 PM    POTASSIUM 3.8 11/05/2019 06:14 PM    CHLORIDE 100 11/05/2019 06:14 PM    CO2 23 11/05/2019 06:14 PM    GLUCOSE 91 11/05/2019 06:14 PM    BUN 14 11/05/2019 06:14 PM    CREATININE 0.63 11/05/2019 06:14 PM    CREATININE 0.9 10/05/2007 12:50 PM     Lab Results   Component Value Date/Time    ALKPHOSPHAT 80 11/05/2019 06:14 PM    ASTSGOT 18 11/05/2019 06:14 PM    ALTSGPT 22 11/05/2019 06:14 PM    TBILIRUBIN 0.4 11/05/2019 06:14 PM           Lab Results   Component Value Date    PROTHROMBTM 13.1 11/06/2019    INR 2.80 02/04/2021          Lab Results   Component Value Date    SODIUM 137 2019    POTASSIUM 3.8 2019    CHLORIDE 100 2019    CO2 23 2019    GLUCOSE 91 2019    BUN 14 2019    CREATININE 0.63 2019    IFAFRICA >60 2019    IFNOTAFR >60 2019        Lab Results   Component Value Date    WBC 8.4 2019    RBC 4.81 2019    HEMOGLOBIN 14.8 2019    HEMATOCRIT 45.1 2019    MCV 93.8 2019    MCH 30.8 2019    MCHC 32.8 (L) 2019    MPV 9.3 2019      VASCULAR IMAGING:     Last EKG:   Results for orders placed or performed during the hospital encounter of 19   EKG   Result Value Ref Range    Report       [facility] Emergency Dept.    Test Date:  2019  Pt Name:    MICHELET BARNES            Department: [facility]  MRN:        [id number]                      Room:  Gender:     Female                       Technician: [id number]  :        [id number]                   Requested By:ER TRIAGE PROTOCOL  Order #:    [id number]                    Reading MD:    Measurements  Intervals                                Axis  Rate:       54                           P:          41  GA:         160                          QRS:        -4  QRSD:       87                           T:          10  QT:         437  QTc:        415    Interpretive Statements  Sinus rhythm  Probable left atrial enlargement  Probable left ventricular hypertrophy  Borderline T abnormalities, anterior leads  Compared to ECG 10/03/2019 14:34:29  T-wave abnormality now present       Echo 2017  No prior study is available for comparison.   Normal left ventricular systolic function.  Normal left ventricular wall thickness.  Mild aortic stenosis.  Mild tricuspid regurgitation.  Estimated right ventricular systolic pressure is 45 mmHg.  No prior study is available for comparison.   Aorta  Ascending aorta is dilated with a diameter of 4.1 cm.    LLE venous 2019    Venous  duplex imaging was performed in only the left lower extremity    including serial compressions and spectral Doppler flow evaluation.   Partially compressible left femoral vein mid & distal with subacute    appearing softly echogenic material (thrombus) in the lumen. Incompressible    left popliteal and gastrocnemius veins with subacute appearing mixed    echogenic material (thrombus) in the lumen.    Partially compressible left peroneal vein with subacute appearing thrombus    in the lumen.    Echo 11/6/19  Prior Echo - 5/19/17.  Normal left ventricular systolic function.   No evidence of valvular abnormality based on Doppler evaluation.   Compared to the images of the prior study done -  there has been no   significant change.   Ascending aorta is dilated with a diameter of 4.0 cm.    CTPA 11/5/19  1.  Isolated pulmonary embolus is identified in superior segment right lower pulmonary lobe.   2.  RV LV ratio is elevated.   3.  There are 2 right lung pulmonary nodules. Largest measures 9 mm in diameter. Recommend follow-up CT in 3-6 months.    LLE venous 5/22/20    Chronic, partial thrombus with flow recanalization in the LEFT popliteal    vein. Chronic, occlusive thrombus in the LEFT peroneal trunk vein.     Compared to the images of the duplex dated 11/6/19 - there has been an    evolution of the DVT.    Medical Decision Making:  Today's Assessment / Status / Plan:     1. Mild dilation of ascending aorta (HCC)  EC-ECHOCARDIOGRAM COMPLETE W/O CONT    POCT Protime   2. Chronic anticoagulation  NM-LUNG VENT/PERF IMAGING    POCT Protime   3. Pulmonary nodules  POCT Protime   4. History of DVT (deep vein thrombosis)  POCT Protime   5. History of pulmonary embolus (PE)  NM-LUNG VENT/PERF IMAGING    CBC WITH DIFF/PLATELET    D-DIMER    POCT Protime   6. Other chest pain  NM-LUNG VENT/PERF IMAGING    CBC WITH DIFF/PLATELET    D-DIMER    POCT Protime    DX-CHEST-2 VIEWS   7. Dyslipidemia  Comp Metabolic Panel    Lipid  "Profile    POCT Protime   8. Varicose veins of leg with swelling, left  POCT Protime   9. Other acute pulmonary embolism without acute cor pulmonale (HCC)  POCT Protime   10. Deep vein thrombosis (DVT) of proximal lower extremity, unspecified chronicity, unspecified laterality (HCC)  POCT Protime   11. Left ventricular hypertrophy by electrocardiogram       PATIENT TYPE: Primary Prevention    Etiology of Established CVD if Present:   1) acute LLE DVT, 11/2019  2) acute PE, 11/2019  3) dilation of the ascending aorta - unclear if athero-related     ANTITHROMBOTIC THERAPY:  Anti-Platelet/Anti-Coagulant Tx recommended: yes  Indication: hx of surg-associated DVT/PE - 11/2019   Date of initiation: 11/2019   HAS-BLED bleeding risk calc ([email or web address]): 1 pt, 3.4%, low risk  Thrombophilia/hypercoag evaluation:  not recommended  Factors to consider for indefinite OAC: None   Last CBC, BMP: 2019  Expected duration: as reviewed with patient, she has completed the recommended 3-6mo of OAC as per current guidelines for surgery-associated VTE.  Since she has ongoing CP and SOB, cough, I recommend continuation for now until we complete further evaluation   Unlikely she will need to continue indefinite VKA   Antithrombotic therapy plan:  - check CXR, VQ scan, echo due to ongoing cough - unable to do CTPA due to anaphylaxis with contrast dye in 1980s   - continue routine INR care with [facility] clinic   -- start/continue knee-high compression socks, 20-30mmHg, as much as tolerated    - increase walking, avoid prolonged standing   - elevated legs while sitting and sleeping above heart level  - reduce sodium (\"salt\") in diet to less than 2,000mg daily   - counseled on signs and symptoms of acute VTE that require seeking prompt attention to include shortness of breath, chest pain, pain with deep inhalation, acute leg swelling and/or pain in calf or leg   - elevate legs as much as possible, use compression stockings/socks if directed by your " "provider  - Avoid hormonal therapies including estrogen or testosterone-containing meds, or raloxifene or tamoxifene (commonly used for osteoporosis)  - Avoid sedentary periods  - continue complete avoidance of tobacco products  - if having any invasive procedure,please make sure the doctor knows of your history of blood clots and current anticoagulation status  - avoid Aspirin and anti-inflammatories (eg. Advil, ibuprofen, Aleve, naproxen, etc) while anticoagulated   - avoid skiing or other dangerous activities to reduce risk of head injury and brain bleeds  - recommended to see your PCP to discuss if you need age-appropriate cancer screenings as a small % of blood clots may be caused by an underlying malignancy  - if any bleeding lasting 30min without stopping, please seek care with your PCP, urgent care, or ED  - reversal agents for most blood thinners are now available and used if you have major bleeding    Ascending aortic aneurysm:   4.0cm on most recent echo  Unlikely cause of current symptoms   No comments about size on most recent CTPA   Presumed cystic medial degeneration with sporadic development.   Echo shows no biscupid Ao valve  No fhx of pmhx of connective tissue disease  - reviewed anatomy, risks, emergency s/s requiring immediate attention and gave handouts.   - recommend 1st degree relatives get screened with echo for TAA  - check echo to r/o expansion  - if normal exams, then repeat annual echo surveillance, if indications of rapid expansion then consider MRA thorax (avoid CT based contrast dye)   - focus on ARB and BB for BP control  - activity restrictions including no extreme endurance activities, smoking, stimulants, and extreme weight lifting >20lbs     LIPID MANAGEMENT:   Qualifies for Statin Therapy Based on 2018 ACC/AHA Guidelines: yes, Primary Prevention - 40-74yo, LDLc >70, <190 w/o DM  10-yr ASCVD risk score:12.1% =  7.5 - <20% \"intermediate risk\" .   Major ASCVD events: None  High-risk " conditions: >64yr old   Risk-enhancers: None  Currently on Statin: No  Treatment goals: LDL-C <100 (consider non-HDL-C <130, apoB <90)  At goal? No per 2019 labs   Plan:   - reinforced ongoing TLC measures as noted   - update labs   Meds:   - low threshold to start at least moderate-intensity statin     BLOOD PRESSURE MANAGEMENT:  ACC/AHA (2017) goal <130/80  Home BP at goal: ???  Office BP at goal:  No, mild high SBP   Indications of end organ damage: dilated Ascending Ao, LVH per EKG   Device candidate? no  Plan:   Monitoring:   - start/continue home BP monitoring, reviewed correct technique, provide BP log and instructions  - order 24h ABPM:  UNDECIDED  - monitor lytes/gfr routinely   - contact office if BP consistently >140/>90 to discussion of tx adjustments   Indicated medications for reduction of expansion rate of TAA:   -Beta-blocker (decreases pulsatile wall stress)   -Losartan (inhibits tissue growth factor beta, decreasing aneurysmal growth rate or rate of expansion)  Medications:  ACEi/ARB:  Use losartan as 1st line   DHP-CCB: none   Thiazide: none   Tom-receptor Antagonist: not indicated at this time   BB:  Metoprolol as 2nd line     GLYCEMIC STATUS:  Normal    LIFESTYLE RECOMMENDATIONS:     Smoking:  reports that she quit smoking about 44 years ago. Her smoking use included cigarettes. She smoked 0.00 packs per day. She has never used smokeless tobacco.   - continued complete avoidance of all tobacco products     Physical Activity: continue healthy activity to improve CV fitness, see care instructions for additional details     Weight Management and Nutrition: Dietary plan was discussed with patient at this visit including DASH, low sodium and/or as outlined in care instructions     OTHER:    1) chest pain, chronic cough   Unlikely cardiac in nature based upon chronicity and prior normal echo except for elevated RVSP   ddx would include CTEPH, other PH, GERD, other   - check CXR, VQ scan, echo   -  update labs   - could consider eval with cardiology and stress testing     Instructed to follow-up with PCP for remainder of adult medical needs: yes  We will partner with other providers in the management of established vascular disease and cardiometabolic risk factors.    Studies to Be Obtained: cxr, vq, echo    Labs to Be Obtained: as noted above     Follow up in: 1 months    Aravind Chapman M.D.  [facility]   [facility]   [phone]

## 2021-02-04 NOTE — NON-PROVIDER
Anticoagulation Summary  As of 2021    INR goal:  2.0-3.0   TTR:  68.8 % (1.2 y)   INR used for dosin.80 (2021)   Warfarin maintenance plan:  10 mg (5 mg x 2) every day   Weekly warfarin total:  70 mg   Plan last modified:  Tarsha Herrera (2020)   Next INR check:  3/11/2021   Target end date:      Indications    Acute pulmonary embolism (HCC) [I26.99]  DVT (deep venous thrombosis) (HCC) [I82.409]             Anticoagulation Episode Summary     INR check location:      Preferred lab:      Send INR reminders to:      Comments:  PE while on Eliquis after diagnosis of DVT      Anticoagulation Care Providers     Provider Role Specialty Phone number    [facility]   [phone]    Ayaan Cormier M.D.  Family Medicine [phone]        Anticoagulation Patient Findings  Patient Findings     Negatives:  Signs/symptoms of thrombosis, Signs/symptoms of bleeding, Laboratory test error suspected, Change in health, Change in alcohol use, Change in activity, Upcoming invasive procedure, Emergency department visit, Upcoming dental procedure, Missed doses, Extra doses, Change in medications, Change in diet/appetite, Hospital admission, Bruising, Other complaints              History of Present Illness: follow up appointment for chronic anticoagulation with the high risk medication, warfarin for PE/DVT    Pt remains therapeutic today. Continue current dosing regimen.  Follow up in 5 weeks, to reduce the risk of adverse events related to this high risk medication, warfarin.    Tarsha Herrera, Clinical Pharmacist

## 2021-02-10 ENCOUNTER — HOSPITAL ENCOUNTER (OUTPATIENT)
Dept: CARDIOLOGY | Facility: MEDICAL CENTER | Age: 71
End: 2021-02-10
Attending: FAMILY MEDICINE
Payer: MEDICARE

## 2021-02-10 DIAGNOSIS — I77.810 MILD DILATION OF ASCENDING AORTA (HCC): ICD-10-CM

## 2021-02-10 LAB
LV EJECT FRACT  99904: 65
LV EJECT FRACT MOD 2C 99903: 80.1
LV EJECT FRACT MOD 4C 99902: 65.98
LV EJECT FRACT MOD BP 99901: 73.85

## 2021-02-10 PROCEDURE — 93306 TTE W/DOPPLER COMPLETE: CPT | Mod: 26 | Performed by: INTERNAL MEDICINE

## 2021-02-10 PROCEDURE — 93306 TTE W/DOPPLER COMPLETE: CPT

## 2021-02-12 ENCOUNTER — HOSPITAL ENCOUNTER (OUTPATIENT)
Dept: LAB | Facility: MEDICAL CENTER | Age: 71
End: 2021-02-12
Attending: FAMILY MEDICINE
Payer: MEDICARE

## 2021-02-12 DIAGNOSIS — Z86.711 HISTORY OF PULMONARY EMBOLUS (PE): ICD-10-CM

## 2021-02-12 DIAGNOSIS — R07.89 OTHER CHEST PAIN: ICD-10-CM

## 2021-02-12 DIAGNOSIS — E78.5 DYSLIPIDEMIA: ICD-10-CM

## 2021-02-12 DIAGNOSIS — Z11.59 SCREENING FOR VIRAL DISEASE: ICD-10-CM

## 2021-02-12 LAB
ALBUMIN SERPL BCP-MCNC: 4.1 G/DL (ref 3.2–4.9)
ALBUMIN/GLOB SERPL: 1.6 G/DL
ALP SERPL-CCNC: 84 U/L (ref 30–99)
ALT SERPL-CCNC: 26 U/L (ref 2–50)
ANION GAP SERPL CALC-SCNC: 9 MMOL/L (ref 7–16)
AST SERPL-CCNC: 20 U/L (ref 12–45)
BASOPHILS # BLD AUTO: 0.8 % (ref 0–1.8)
BASOPHILS # BLD: 0.04 K/UL (ref 0–0.12)
BILIRUB SERPL-MCNC: 0.2 MG/DL (ref 0.1–1.5)
BUN SERPL-MCNC: 19 MG/DL (ref 8–22)
CALCIUM SERPL-MCNC: 9.1 MG/DL (ref 8.5–10.5)
CHLORIDE SERPL-SCNC: 108 MMOL/L (ref 96–112)
CHOLEST SERPL-MCNC: 205 MG/DL (ref 100–199)
CO2 SERPL-SCNC: 25 MMOL/L (ref 20–33)
COVID ORDER STATUS COVID19: NORMAL
CREAT SERPL-MCNC: 0.72 MG/DL (ref 0.5–1.4)
D DIMER PPP IA.FEU-MCNC: <0.27 UG/ML (FEU) (ref 0–0.5)
EOSINOPHIL # BLD AUTO: 0.13 K/UL (ref 0–0.51)
EOSINOPHIL NFR BLD: 2.5 % (ref 0–6.9)
ERYTHROCYTE [DISTWIDTH] IN BLOOD BY AUTOMATED COUNT: 51.6 FL (ref 35.9–50)
GLOBULIN SER CALC-MCNC: 2.5 G/DL (ref 1.9–3.5)
GLUCOSE SERPL-MCNC: 98 MG/DL (ref 65–99)
HCT VFR BLD AUTO: 48.6 % (ref 37–47)
HDLC SERPL-MCNC: 65 MG/DL
HGB BLD-MCNC: 15.4 G/DL (ref 12–16)
IMM GRANULOCYTES # BLD AUTO: 0.02 K/UL (ref 0–0.11)
IMM GRANULOCYTES NFR BLD AUTO: 0.4 % (ref 0–0.9)
LDLC SERPL CALC-MCNC: 127 MG/DL
LYMPHOCYTES # BLD AUTO: 2.11 K/UL (ref 1–4.8)
LYMPHOCYTES NFR BLD: 40.9 % (ref 22–41)
MCH RBC QN AUTO: 29.9 PG (ref 27–33)
MCHC RBC AUTO-ENTMCNC: 31.7 G/DL (ref 33.6–35)
MCV RBC AUTO: 94.4 FL (ref 81.4–97.8)
MONOCYTES # BLD AUTO: 0.44 K/UL (ref 0–0.85)
MONOCYTES NFR BLD AUTO: 8.5 % (ref 0–13.4)
NEUTROPHILS # BLD AUTO: 2.42 K/UL (ref 2–7.15)
NEUTROPHILS NFR BLD: 46.9 % (ref 44–72)
NRBC # BLD AUTO: 0 K/UL
NRBC BLD-RTO: 0 /100 WBC
PLATELET # BLD AUTO: 240 K/UL (ref 164–446)
PMV BLD AUTO: 10.7 FL (ref 9–12.9)
POTASSIUM SERPL-SCNC: 4.5 MMOL/L (ref 3.6–5.5)
PROT SERPL-MCNC: 6.6 G/DL (ref 6–8.2)
RBC # BLD AUTO: 5.15 M/UL (ref 4.2–5.4)
SARS-COV-2 RNA RESP QL NAA+PROBE: NOTDETECTED
SODIUM SERPL-SCNC: 142 MMOL/L (ref 135–145)
SPECIMEN SOURCE: NORMAL
TRIGL SERPL-MCNC: 67 MG/DL (ref 0–149)
WBC # BLD AUTO: 5.2 K/UL (ref 4.8–10.8)

## 2021-02-12 PROCEDURE — U0005 INFEC AGEN DETEC AMPLI PROBE: HCPCS

## 2021-02-12 PROCEDURE — 85379 FIBRIN DEGRADATION QUANT: CPT

## 2021-02-12 PROCEDURE — C9803 HOPD COVID-19 SPEC COLLECT: HCPCS

## 2021-02-12 PROCEDURE — U0003 INFECTIOUS AGENT DETECTION BY NUCLEIC ACID (DNA OR RNA); SEVERE ACUTE RESPIRATORY SYNDROME CORONAVIRUS 2 (SARS-COV-2) (CORONAVIRUS DISEASE [COVID-19]), AMPLIFIED PROBE TECHNIQUE, MAKING USE OF HIGH THROUGHPUT TECHNOLOGIES AS DESCRIBED BY CMS-2020-01-R: HCPCS

## 2021-02-12 PROCEDURE — 80061 LIPID PANEL: CPT

## 2021-02-12 PROCEDURE — 80053 COMPREHEN METABOLIC PANEL: CPT

## 2021-02-12 PROCEDURE — 85025 COMPLETE CBC W/AUTO DIFF WBC: CPT

## 2021-02-12 PROCEDURE — 36415 COLL VENOUS BLD VENIPUNCTURE: CPT

## 2021-02-18 ENCOUNTER — HOSPITAL ENCOUNTER (OUTPATIENT)
Dept: RADIOLOGY | Facility: MEDICAL CENTER | Age: 71
End: 2021-02-18
Attending: FAMILY MEDICINE
Payer: MEDICARE

## 2021-02-18 DIAGNOSIS — R07.89 OTHER CHEST PAIN: ICD-10-CM

## 2021-02-18 PROCEDURE — 71046 X-RAY EXAM CHEST 2 VIEWS: CPT

## 2021-02-19 ENCOUNTER — TELEPHONE (OUTPATIENT)
Dept: VASCULAR LAB | Facility: MEDICAL CENTER | Age: 71
End: 2021-02-19

## 2021-02-19 ENCOUNTER — ANTICOAGULATION MONITORING (OUTPATIENT)
Dept: VASCULAR LAB | Facility: MEDICAL CENTER | Age: 71
End: 2021-02-19

## 2021-02-19 ENCOUNTER — HOSPITAL ENCOUNTER (OUTPATIENT)
Dept: RADIOLOGY | Facility: MEDICAL CENTER | Age: 71
End: 2021-02-19
Attending: FAMILY MEDICINE
Payer: MEDICARE

## 2021-02-19 DIAGNOSIS — I82.4Y9 DEEP VEIN THROMBOSIS (DVT) OF PROXIMAL LOWER EXTREMITY, UNSPECIFIED CHRONICITY, UNSPECIFIED LATERALITY (HCC): ICD-10-CM

## 2021-02-19 DIAGNOSIS — Z79.01 CHRONIC ANTICOAGULATION: ICD-10-CM

## 2021-02-19 DIAGNOSIS — R07.89 OTHER CHEST PAIN: ICD-10-CM

## 2021-02-19 DIAGNOSIS — Z86.711 HISTORY OF PULMONARY EMBOLUS (PE): ICD-10-CM

## 2021-02-19 DIAGNOSIS — I26.99 OTHER ACUTE PULMONARY EMBOLISM WITHOUT ACUTE COR PULMONALE (HCC): ICD-10-CM

## 2021-02-19 PROCEDURE — A9540 TC99M MAA: HCPCS

## 2021-02-20 NOTE — PROGRESS NOTES
Discharged from [facility].  Pt has been transitioned to ASA by REGINE Chapman.  Tarsha Herrera, Clinical Pharmacist, CDE, CACP

## 2021-02-20 NOTE — TELEPHONE ENCOUNTER
VQ scan is negative for PE.  MA to inform patient ok to stop warfarin and we will cancel any upcoming INR visits.  She is to start aspirin 81mg 2 tabs daily.  Has pending f/u with me 3/11 to discuss further.

## 2021-03-11 ENCOUNTER — OFFICE VISIT (OUTPATIENT)
Dept: VASCULAR LAB | Facility: MEDICAL CENTER | Age: 71
End: 2021-03-11
Attending: FAMILY MEDICINE
Payer: MEDICARE

## 2021-03-11 VITALS
WEIGHT: 172 LBS | BODY MASS INDEX: 31.65 KG/M2 | HEIGHT: 62 IN | HEART RATE: 56 BPM | DIASTOLIC BLOOD PRESSURE: 69 MMHG | SYSTOLIC BLOOD PRESSURE: 137 MMHG

## 2021-03-11 DIAGNOSIS — I83.892 VARICOSE VEINS OF LEG WITH SWELLING, LEFT: ICD-10-CM

## 2021-03-11 DIAGNOSIS — Z86.718 HISTORY OF DVT (DEEP VEIN THROMBOSIS): ICD-10-CM

## 2021-03-11 DIAGNOSIS — Z86.711 HISTORY OF PULMONARY EMBOLUS (PE): ICD-10-CM

## 2021-03-11 DIAGNOSIS — R91.8 PULMONARY NODULES: ICD-10-CM

## 2021-03-11 DIAGNOSIS — R06.09 DOE (DYSPNEA ON EXERTION): ICD-10-CM

## 2021-03-11 DIAGNOSIS — E78.5 DYSLIPIDEMIA: ICD-10-CM

## 2021-03-11 DIAGNOSIS — I27.20 PULMONARY HYPERTENSION (HCC): ICD-10-CM

## 2021-03-11 DIAGNOSIS — R07.89 OTHER CHEST PAIN: ICD-10-CM

## 2021-03-11 DIAGNOSIS — I07.9 TRICUSPID VALVULAR DISEASE: ICD-10-CM

## 2021-03-11 DIAGNOSIS — I51.7 LEFT VENTRICULAR HYPERTROPHY BY ELECTROCARDIOGRAM: ICD-10-CM

## 2021-03-11 DIAGNOSIS — I77.810 MILD DILATION OF ASCENDING AORTA (HCC): ICD-10-CM

## 2021-03-11 PROCEDURE — 99212 OFFICE O/P EST SF 10 MIN: CPT

## 2021-03-11 PROCEDURE — 99214 OFFICE O/P EST MOD 30 MIN: CPT | Performed by: FAMILY MEDICINE

## 2021-03-11 ASSESSMENT — ENCOUNTER SYMPTOMS
FEVER: 0
NAUSEA: 0
DIZZINESS: 0
CLAUDICATION: 0
TREMORS: 0
SHORTNESS OF BREATH: 1
CHILLS: 0
HEMOPTYSIS: 0
DIARRHEA: 0
SPUTUM PRODUCTION: 0
WEAKNESS: 0
HEADACHES: 0
ABDOMINAL PAIN: 0
MYALGIAS: 0
BRUISES/BLEEDS EASILY: 0
VOMITING: 0
SEIZURES: 0
FOCAL WEAKNESS: 0
WHEEZING: 0
COUGH: 1
PALPITATIONS: 0

## 2021-03-11 ASSESSMENT — FIBROSIS 4 INDEX: FIB4 SCORE: 1.16

## 2021-03-11 NOTE — PROGRESS NOTES
Follow- up VASCULAR ANTICOAGULATION VISIT  Subjective:   Regina Cornell is a 70 y.o. female who presents today 03/11/21  for   Chief Complaint   Patient presents with   • Follow-Up     Initially referred by Sia Davis MD for length of therapy (LOT) determination and management of anticoagulation in context of acute venothromboembolic disease    HPI:    VTE disease / Anticoagulation:   Current symptoms: still with ongoing chest heaviness and perception of SOB.  Had normal VQ, echo with mild high RVSP some mild valvular insuff normal LVEF.  All labs normal including Ddimer. No prior PFTs or cardio vs pulm eval.  Had seen Dr. Lockhart (cardio) in the past.   Current antithrombotic agent: stopped VKA at last visit, currently ASA 162mg daily   Complications: none, tolerating well, no bleeding or bruising   Date of initiation of anticoagulation: 11/2019, cessation 2/2021   Adherence: reports complete, no missed doses      Pertinent VTE pmhx:   Date of Diagnosis: cannot recall exact date 10/30/19  Type of Venous thromboembolic disease (VTE): acute LLE DVT  Type of imaging: duplex, CTPA - repeated 5/2020  Preceding/presenting symptoms: LLE swelling during post-op period after knee surgery - approximately 2 weeks postop had initial duplex, started on eliquis and, despite compliant therapy, then developed acute SOB, CP and found to have PE, switched to VKA as deemed eliquis failure   Antithrombotic therapy at time of VTE event: no  VTE tx course: initiated on Eliquis but then noted to have new onset SOB with CTPA showing possible new PE, transitioned to VKA since   Any personal VTE hx? No  Any family VTE hx? No    UNPROVOKED VS PROVOKED:   Recent surgery ? Yes, Details: 10/16/20 - L knee athroscopy with Dr. Booth  Recent trauma ? No  Smoker?  reports that she quit smoking about 44 years ago. Her smoking use included cigarettes. She smoked 0.00 packs per day. She has never used smokeless tobacco.    Extended travel?  No  Other periods of immobility? Yes, Details: post-op   Other risk factors for VTE disease:  no  WOMEN: Colorectal CA screening: yes       Cervical CA screening: yes       Breast CA screening: yes       Any estrogen, testosterone HRT, E2 birth control, tamoxifene, raloxifene: no       Hx of recurrent miscarriages: no  Hypercoaguability work-up completed? no    Hyperlipidemia:   No current meds   Current treatment: none   Myalgias? Not applicable  Other adverse drug reactions? Not applicable  Last lipid profile: reviewed with patient, as noted below   Hx of clinical ASCVD events: None     HTN:  Current HTN concerns: No specific concerns since last visit   ADRs: No  HTN sx:  No current blurred or changed vision, chest pain, shortness of breath, headache, nausea, dizziness/vertigo   Home BP log: does not check routinely   24h ABPM completed: not tested   Adherence to current HTN meds: compliant all of the time      Thoracic Aortic Aneurysm  Type: ascending   Current size: 4.0cm per echo in 2019  Current/interval concerns: none   Current sx: denies chest, back, abdominal pain, SOB, dysphagia, worsening cough, hemoptysis.      Current Outpatient Medications:   •  aspirin EC, 162 mg, Oral, DAILY, Taking     Social History     Tobacco Use   • Smoking status: Former Smoker     Packs/day: 0.00     Types: Cigarettes     Quit date:      Years since quittin.2   • Smokeless tobacco: Never Used   Substance Use Topics   • Alcohol use: Yes     Comment: occasional   • Drug use: No     DIET AND EXERCISE:  Weight Change:stable   BMI Readings from Last 5 Encounters:   21 31.46 kg/m²   21 31.61 kg/m²   20 29.26 kg/m²   20 31.09 kg/m²   19 29.45 kg/m²     Diet: common adult  Exercise: no regular exercise program     Review of Systems   Constitutional: Negative for chills, fever and malaise/fatigue.   Respiratory: Positive for cough and shortness of breath (chronic since 2019 - not worsening ).  "Negative for hemoptysis, sputum production and wheezing.    Cardiovascular: Positive for chest pain (chronic since PE in 2019). Negative for palpitations, claudication and leg swelling.   Gastrointestinal: Negative for abdominal pain, diarrhea, nausea and vomiting.   Musculoskeletal: Negative for joint pain and myalgias.   Skin: Negative for itching and rash.   Neurological: Negative for dizziness, tremors, focal weakness, seizures, weakness and headaches.   Endo/Heme/Allergies: Does not bruise/bleed easily.        Objective:     Vitals:    03/11/21 0810 03/11/21 0813   BP: 132/70 137/69   BP Location: Left arm Left arm   Patient Position: Sitting Sitting   BP Cuff Size: Adult Adult   Pulse: (!) 55 (!) 56   Weight: 78 kg (172 lb)    Height: 1.575 m (5' 2\")       BP Readings from Last 5 Encounters:   03/11/21 137/69   02/04/21 140/68   05/01/20 142/87   02/18/20 142/82   12/05/19 138/77      Body mass index is 31.46 kg/m².     Physical Exam  Vitals reviewed.   Constitutional:       General: She is not in acute distress.     Appearance: Normal appearance.   HENT:      Head: Normocephalic and atraumatic.      Nose: Nose normal.      Mouth/Throat:      Mouth: Mucous membranes are dry.      Pharynx: Oropharynx is clear.   Eyes:      General: Lids are normal.      Extraocular Movements: Extraocular movements intact.      Conjunctiva/sclera: Conjunctivae normal.   Neck:      Thyroid: No thyroid mass.      Vascular: Normal carotid pulses. No carotid bruit.      Trachea: Trachea normal.   Cardiovascular:      Rate and Rhythm: Normal rate and regular rhythm.      Chest Wall: PMI is not displaced.      Pulses: Normal pulses.           Carotid pulses are 2+ on the right side and 2+ on the left side.       Radial pulses are 2+ on the right side and 2+ on the left side.        Dorsalis pedis pulses are 2+ on the right side and 2+ on the left side.        Posterior tibial pulses are 2+ on the right side and 2+ on the left side.    "   Heart sounds: Normal heart sounds.      Comments:    Spider telangectasia:       RLE:  None      LLE: none   Varicosities:           RLE: none      LLE: none   Corona phlebectatica:      RLE:  None        LLE:  None   Cording:         RLE:  None     LLE: None     Pulmonary:      Effort: Pulmonary effort is normal.      Breath sounds: Normal breath sounds.   Abdominal:      General: Abdomen is flat. Bowel sounds are normal.      Palpations: Abdomen is soft.   Musculoskeletal:      Cervical back: Full passive range of motion without pain and neck supple.      Right lower leg: No edema.      Left lower leg: No edema.   Skin:     General: Skin is warm and dry.      Capillary Refill: Capillary refill takes less than 2 seconds.      Coloration: Skin is not cyanotic.      Nails: There is no clubbing.   Neurological:      General: No focal deficit present.      Mental Status: She is alert and oriented to person, place, and time. Mental status is at baseline.      Cranial Nerves: Cranial nerves are intact.      Coordination: Coordination is intact.      Gait: Gait is intact.   Psychiatric:         Mood and Affect: Mood normal.         Behavior: Behavior normal.       Lab Results   Component Value Date/Time    CHOLSTRLTOT 205 (H) 02/12/2021 07:35 AM     (H) 02/12/2021 07:35 AM    HDL 65 02/12/2021 07:35 AM    TRIGLYCERIDE 67 02/12/2021 07:35 AM       Lab Results   Component Value Date/Time    SODIUM 142 02/12/2021 07:35 AM    POTASSIUM 4.5 02/12/2021 07:35 AM    CHLORIDE 108 02/12/2021 07:35 AM    CO2 25 02/12/2021 07:35 AM    GLUCOSE 98 02/12/2021 07:35 AM    BUN 19 02/12/2021 07:35 AM    CREATININE 0.72 02/12/2021 07:35 AM    CREATININE 0.9 10/05/2007 12:50 PM     Lab Results   Component Value Date/Time    ALKPHOSPHAT 84 02/12/2021 07:35 AM    ASTSGOT 20 02/12/2021 07:35 AM    ALTSGPT 26 02/12/2021 07:35 AM    TBILIRUBIN 0.2 02/12/2021 07:35 AM       Lab Results   Component Value Date    CHOLSTRLTOT 205 (H)  2021     (H) 2021    HDL 65 2021    TRIGLYCERIDE 67 2021      Lab Results   Component Value Date    PROTHROMBTM 13.1 2019    INR 2.80 2021         Lab Results   Component Value Date    SODIUM 142 2021    POTASSIUM 4.5 2021    CHLORIDE 108 2021    CO2 25 2021    GLUCOSE 98 2021    BUN 19 2021    CREATININE 0.72 2021    IFAFRICA >60 2021    IFNOTAFR >60 2021        Lab Results   Component Value Date    WBC 5.2 2021    RBC 5.15 2021    HEMOGLOBIN 15.4 2021    HEMATOCRIT 48.6 (H) 2021    MCV 94.4 2021    MCH 29.9 2021    MCHC 31.7 (L) 2021    MPV 10.7 2021      VASCULAR IMAGING:     Last EKG:   Results for orders placed or performed during the hospital encounter of 19   EKG   Result Value Ref Range    Report       [facility] Emergency Dept.    Test Date:  2019  Pt Name:    MICHELET BARNES            Department: [facility]  MRN:        [id number]                      Room:  Gender:     Female                       Technician: [id number]  :        1950                   Requested By:ER TRIAGE PROTOCOL  Order #:    [id number]                    Reading MD:    Measurements  Intervals                                Axis  Rate:       54                           P:          41  VT:         160                          QRS:        -4  QRSD:       87                           T:          10  QT:         437  QTc:        415    Interpretive Statements  Sinus rhythm  Probable left atrial enlargement  Probable left ventricular hypertrophy  Borderline T abnormalities, anterior leads  Compared to ECG 10/03/2019 14:34:29  T-wave abnormality now present       Echo 2017  No prior study is available for comparison.   Normal left ventricular systolic function.  Normal left ventricular wall thickness.  Mild aortic stenosis.  Mild tricuspid  regurgitation.  Estimated right ventricular systolic pressure is 45 mmHg.  No prior study is available for comparison.   Aorta  Ascending aorta is dilated with a diameter of 4.1 cm.    [facility] venous 11/6/2019    Venous duplex imaging was performed in only the left lower extremity    including serial compressions and spectral Doppler flow evaluation.   Partially compressible left femoral vein mid & distal with subacute    appearing softly echogenic material (thrombus) in the lumen. Incompressible    left popliteal and gastrocnemius veins with subacute appearing mixed    echogenic material (thrombus) in the lumen.    Partially compressible left peroneal vein with subacute appearing thrombus    in the lumen.    Echo 11/6/19  Prior Echo - 5/19/17.  Normal left ventricular systolic function.   No evidence of valvular abnormality based on Doppler evaluation.   Compared to the images of the prior study done -  there has been no   significant change.   Ascending aorta is dilated with a diameter of 4.0 cm.    CTPA 11/5/19  1.  Isolated pulmonary embolus is identified in superior segment right lower pulmonary lobe.   2.  RV LV ratio is elevated.   3.  There are 2 right lung pulmonary nodules. Largest measures 9 mm in diameter. Recommend follow-up CT in 3-6 months.    [facility] venous 5/22/20    Chronic, partial thrombus with flow recanalization in the LEFT popliteal    vein. Chronic, occlusive thrombus in the LEFT peroneal trunk vein.     Compared to the images of the duplex dated 11/6/19 - there has been an    evolution of the DVT.    CXR 2/2021   No acute cardiopulmonary findings.    VQ scan 2/2021    Low probability of pulmonary embolism.    Echo 2/2021   Compared to the images of the prior study done 11/6/2019 - the estimate   of right ventricular systolic pressure is increased, previously 25   mmHg.  Normal left ventricular systolic function.  Left ventricular ejection fraction is visually estimated to be 65%.  Normal right  "ventricular size and systolic function.  Mild mitral regurgitation.  Mild aortic insufficiency.  Mild tricuspid regurgitation.  Estimated right ventricular systolic pressure is 40 mmHg.  Ascending aorta is borderline dilated with a diameter of 4.0 cm.    Medical Decision Making:  Today's Assessment / Status / Plan:     1. Mild dilation of ascending aorta (HCC)     2. Pulmonary nodules     3. History of DVT (deep vein thrombosis)     4. History of pulmonary embolus (PE)  PULMONARY FUNCTION TESTS -Test requested: Complete Pulmonary Function Test; Include MIPS/MEPS? No   5. Other chest pain  REFERRAL TO CARDIOLOGY   6. Dyslipidemia  Comp Metabolic Panel    Lipid Profile   7. Varicose veins of leg with swelling, left     8. Left ventricular hypertrophy by electrocardiogram     9. Tricuspid valvular disease  REFERRAL TO CARDIOLOGY   10. Pulmonary hypertension (HCC)  REFERRAL TO CARDIOLOGY   11. WILKERSON (dyspnea on exertion)  PULMONARY FUNCTION TESTS -Test requested: Complete Pulmonary Function Test; Include MIPS/MEPS? No     PATIENT TYPE: Primary Prevention    Etiology of Established CVD if Present:   1) acute LLE DVT, 11/2019  2) acute PE, 11/2019  3) dilation of the ascending aorta - unclear if athero-related     ANTITHROMBOTIC THERAPY:  Anti-Platelet/Anti-Coagulant Tx recommended: yes  Indication: hx of surg-associated DVT/PE - 11/2019   Date of initiation: 11/2019   HAS-BLED bleeding risk calc ([email or web address]): 1 pt, 3.4%, low risk  Thrombophilia/hypercoag evaluation:  not recommended  Factors to consider for indefinite OAC: None   Last CBC, BMP: 2019  Expected duration: completed VKA 2/2021   CXR, echo, VQ, DDimer negative 2/2021   Antithrombotic therapy plan:  - continue ASA 162mg indefinitely   -- start/continue knee-high compression socks, 20-30mmHg, as much as tolerated    - increase walking, avoid prolonged standing   - elevated legs while sitting and sleeping above heart level  - reduce sodium (\"salt\") in diet to less " than 2,000mg daily   - counseled on signs and symptoms of acute VTE that require seeking prompt attention to include shortness of breath, chest pain, pain with deep inhalation, acute leg swelling and/or pain in calf or leg   - elevate legs as much as possible, use compression stockings/socks if directed by your provider  - Avoid hormonal therapies including estrogen or testosterone-containing meds, or raloxifene or tamoxifene (commonly used for osteoporosis)  - Avoid sedentary periods  - continue complete avoidance of tobacco products  - if having any invasive procedure,please make sure the doctor knows of your history of blood clots and current anticoagulation status  - avoid Aspirin and anti-inflammatories (eg. Advil, ibuprofen, Aleve, naproxen, etc) while anticoagulated   - avoid skiing or other dangerous activities to reduce risk of head injury and brain bleeds  - recommended to see your PCP to discuss if you need age-appropriate cancer screenings as a small % of blood clots may be caused by an underlying malignancy  - if any bleeding lasting 30min without stopping, please seek care with your PCP, urgent care, or ED  - reversal agents for most blood thinners are now available and used if you have major bleeding    Ascending aortic aneurysm:   4.0cm on most recent echo, 2/2021   Unlikely cause of current symptoms   No comments about size on most recent CTPA   Presumed cystic medial degeneration with sporadic development.   Echo shows no biscupid Ao valve  No fhx of pmhx of connective tissue disease  - reviewed anatomy, risks, emergency s/s requiring immediate attention and gave handouts.   -  repeat annual echo surveillance (Feb), if indications of rapid expansion then consider MRA thorax (avoid CT based contrast dye)   - focus on ARB and BB for BP control if HTN developed   - activity restrictions including no extreme endurance activities, smoking, stimulants, and extreme weight lifting >20lbs     LIPID  "MANAGEMENT:   Qualifies for Statin Therapy Based on 2018 ACC/AHA Guidelines: yes, Primary Prevention - 40-76yo, LDLc >70, <190 w/o DM  10-yr ASCVD risk score:12.1% =  7.5 - <20% \"intermediate risk\" .   Major ASCVD events: None  High-risk conditions: >64yr old   Risk-enhancers: None  Currently on Statin: No  Treatment goals: LDL-C <100 (consider non-HDL-C <130, apoB <90)  At goal? No per 2019 labs   Plan:   - reinforced ongoing TLC measures as noted   - update labs   Meds:   - low threshold to start at least moderate-intensity statin    BLOOD PRESSURE MANAGEMENT:  ACC/AHA (2017) goal <130/80  Home BP at goal: ???  Office BP at goal:  No, mild high SBP   Indications of end organ damage: dilated Ascending Ao, LVH per EKG   Device candidate? no  Plan:   Monitoring:   - start/continue home BP monitoring, reviewed correct technique, provide BP log and instructions  - order 24h ABPM:  UNDECIDED  - monitor lytes/gfr routinely   - contact office if BP consistently >140/>90 to discussion of tx adjustments   Indicated medications for reduction of expansion rate of TAA:   -Beta-blocker (decreases pulsatile wall stress)   -Losartan (inhibits tissue growth factor beta, decreasing aneurysmal growth rate or rate of expansion)  Medications:  ACEi/ARB:  Use losartan as 1st line   DHP-CCB: none   Thiazide: none   Tom-receptor Antagonist: not indicated at this time   BB:  Metoprolol as 2nd line     GLYCEMIC STATUS:  Normal    LIFESTYLE RECOMMENDATIONS:     Smoking:  reports that she quit smoking about 44 years ago. Her smoking use included cigarettes. She smoked 0.00 packs per day. She has never used smokeless tobacco.   - continued complete avoidance of all tobacco products     Physical Activity: continue healthy activity to improve CV fitness, see care instructions for additional details     Weight Management and Nutrition: Dietary plan was discussed with patient at this visit including DASH, low sodium and/or as outlined in care " instructions     OTHER:    1) chest pain, chronic cough   Unclear etiology, less likely cardiogenic.  Possibly primary pulm in origin though CXR normal.     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   Neg echo, CXR, VQ scan, Ddimer    - recommend cardiology eval and PFTs   - consider pulm MD donovan     Instructed to follow-up with PCP for remainder of adult medical needs: yes  We will partner with other providers in the management of established vascular disease and cardiometabolic risk factors.    Studies to Be Obtained: echo 2/2022 - not ordered, aprn to track   Labs to Be Obtained: as noted above     Follow up in:  July with me     Aravind Chapman M.D.  [facility]   [facility]   [phone]

## 2021-03-11 NOTE — PATIENT INSTRUCTIONS
MEDITERRANEAN DIET PATIENT GUIDES:   1) MEDITERRANEAN LIVING GUIDE: https://www.mediterraneanliving.com/  2) MED PLATE METHOD: https://www.nutrition.va.gov/docs/UpdatedPatientEd/Mediterraneandiet.pdf  3) SAMPLE MENU: https://www.[employer]/wp-content/uploads/2013/06/The-Mediterranean-Diet.pdf    7 SIMPLE STEPS TO FOLLOW THE MEDITERRANEAN DIET:   https://[employer].com/7-simple-steps-to-follow-the-mediterranean-diet/    Eating like those who live in the Mediterranean region has been shown to promote health and decrease risk of many chronic diseases. Following a traditional Mediterranean-style eating pattern has been shown to decrease some forms of cancer, protect against cognitive decline, improve eye health, decrease the risk of type 2 diabetes, help manage blood pressure, reduce cardiovascular disease, and is more effective than a low-fat diet for weight loss. Eating the Mediterranean way is not only healthy, it is delicious and satisfying. Foods that you once thought of as too high in fat or unhealthy, including nuts, olive oil, olives, and whole grains, become an everyday part of your diet. The following simple steps will help you eat the Med Way every day.    1. CHANGE YOUR PROTEINS     Replace some of the meat in your dish with plant proteins such as beans, nuts, and seeds often.    Eat fish and seafood at least two to three times per week. Include fatty fish, such as mackerel or salmon at least once a week. Eat fried fish only occasionally.    Choose white-meat poultry such as turkey or chicken breast.    Limit red meat and/or choose lean red meat.    Greatly limit or eliminate processed meats.    Mediterranean diet    2. SWAP YOUR FATS     Choose olive oil.    Replace solid fats such as butter and margarine with olive oil or canola oil.    Use olive oil for cooking, in dressings, and marinades.    Aim to consume at least four tablespoons of olive oil a day, while keeping within your calorie  budget.    mediterranean diet    3. EAT MORE VEGETABLES     Get at least three servings (three cups) of vegetables per day.    Choose a variety of colors.    Eat more dark green leafy vegetables such as collards, kale, spinach, chard, and turnip greens.    mediterranean diet    4. EAT MORE FRUITS     Get at least two servings (two cups) of fruits per day.    Choose a variety of colors.    Include berries often.    med diet    5. SNACK ON NUTS AND SEEDS     Choose at least three ounces (three small handfuls) of nuts and seeds per week, while keeping within your calorie budget.    Avoid candied, honey-roasted, and heavily salted nuts and seeds.    mediterranean diet    6. MAKE YOUR GRAINS WHOLE     Eat grains as grains    Choose whole grains such as oatmeal, quinoa, brown rice, and popcorn.    Look for “whole” in the first ingredient on the ingredient list (e.g., “whole wheat”) when choosing bread, pasta, and other grain-based foods.    mediterranean diet recipes    7.  RETHINK SWEETS     Limit your sugar intake    Choose no more than three servings per week of high-sugar foods and drinks such as sugar-sweetened snacks, candies, desserts, or beverages.    For more information about eating the Med Way, visit [employer].

## 2021-04-02 ENCOUNTER — HOSPITAL ENCOUNTER (OUTPATIENT)
Dept: PULMONOLOGY | Facility: MEDICAL CENTER | Age: 71
End: 2021-04-02
Attending: FAMILY MEDICINE
Payer: MEDICARE

## 2021-04-02 PROCEDURE — 94726 PLETHYSMOGRAPHY LUNG VOLUMES: CPT

## 2021-04-02 PROCEDURE — 94060 EVALUATION OF WHEEZING: CPT

## 2021-04-02 PROCEDURE — 94729 DIFFUSING CAPACITY: CPT

## 2021-04-02 RX ORDER — ALBUTEROL SULFATE 90 UG/1
2 AEROSOL, METERED RESPIRATORY (INHALATION)
Status: DISCONTINUED | OUTPATIENT
Start: 2021-04-02 | End: 2021-04-03 | Stop reason: HOSPADM

## 2021-04-02 ASSESSMENT — PULMONARY FUNCTION TESTS
FEV1/FVC_PERCENT_PREDICTED: 106
FEV1/FVC: 82
FEV1_LLN: 1.70
FVC_PREDICTED: 2.62
FEV1/FVC: 85.19
FEV1/FVC_PERCENT_PREDICTED: 108
FEV1_PERCENT_PREDICTED: 111
FVC: 2.77
FVC_PERCENT_PREDICTED: 105
FEV1/FVC_PERCENT_LLN: 65.36
FVC_LLN: 2.19
FEV1/FVC_PERCENT_PREDICTED: 78
FEV1_PERCENT_CHANGE: 0
FEV1: 2.26
FEV1/FVC_PERCENT_PREDICTED: 110
FEV1/FVC_PERCENT_LLN: 65.36
FEV1_PERCENT_PREDICTED: 111
FEV1_PERCENT_CHANGE: -4
FEV1/FVC_PERCENT_CHANGE: 0
FEV1/FVC_PREDICTED: 78.28
FEV1/FVC: 81.69
FVC_LLN: 2.19
FEV1_LLN: 1.70
FVC: 2.66
FEV1: 2.26
FEV1/FVC_PERCENT_PREDICTED: 104
FEV1_PREDICTED: 2.04
FEV1/FVC_PERCENT_CHANGE: 4
FVC_PERCENT_PREDICTED: 101
FEV1/FVC: 84.96

## 2021-04-05 PROCEDURE — 94726 PLETHYSMOGRAPHY LUNG VOLUMES: CPT | Mod: 26 | Performed by: INTERNAL MEDICINE

## 2021-04-05 PROCEDURE — 94060 EVALUATION OF WHEEZING: CPT | Mod: 26 | Performed by: INTERNAL MEDICINE

## 2021-04-05 PROCEDURE — 94729 DIFFUSING CAPACITY: CPT | Mod: 26 | Performed by: INTERNAL MEDICINE

## 2021-04-05 NOTE — PROCEDURES
DATE OF SERVICE:  04/02/2021     PULMONARY FUNCTION TEST INTERPRETATION     REQUESTING PROVIDER:  Aravind Chapman MD     REASON FOR REQUEST:  PE and dyspnea on exertion.     INTERPRETATION:  1.  Acceptable and reproducible.  2.  FEV1 2.29 liters (111%), FVC 2.77 liters (105%), ratio of 81%.  3.  Flow volume loops normal appearing.  4.  TLC 4.34 liters (91%).  5.  DLCO 20.61 mL/min/mmHg (113%).     IMPRESSION:  Normal spirometry with no response to bronchodilator.  Normal   total lung capacity, reduced expiratory reserve volume, which is due to   elevated weight to height ratio.  Normal gas transfer.        ______________________________  MD RITU Nunez/JANETH/GIGI    DD:  04/04/2021 18:20  DT:  04/04/2021 19:37    Job#:  [id number]    CC:Aravind Chapman MD

## 2021-12-13 ENCOUNTER — DOCUMENTATION (OUTPATIENT)
Dept: VASCULAR LAB | Facility: MEDICAL CENTER | Age: 71
End: 2021-12-13

## 2021-12-13 DIAGNOSIS — I71.21 ASCENDING AORTIC ANEURYSM (HCC): ICD-10-CM

## 2022-01-12 ENCOUNTER — OFFICE VISIT (OUTPATIENT)
Dept: MEDICAL GROUP | Facility: MEDICAL CENTER | Age: 72
End: 2022-01-12
Payer: MEDICARE

## 2022-01-12 VITALS
DIASTOLIC BLOOD PRESSURE: 68 MMHG | TEMPERATURE: 97.6 F | HEIGHT: 62 IN | BODY MASS INDEX: 31.65 KG/M2 | WEIGHT: 172 LBS | SYSTOLIC BLOOD PRESSURE: 124 MMHG | HEART RATE: 74 BPM | OXYGEN SATURATION: 95 %

## 2022-01-12 DIAGNOSIS — Z12.11 COLON CANCER SCREENING: ICD-10-CM

## 2022-01-12 DIAGNOSIS — R73.9 HYPERGLYCEMIA: ICD-10-CM

## 2022-01-12 DIAGNOSIS — E78.5 DYSLIPIDEMIA: ICD-10-CM

## 2022-01-12 DIAGNOSIS — Z12.31 ENCOUNTER FOR SCREENING MAMMOGRAM FOR MALIGNANT NEOPLASM OF BREAST: ICD-10-CM

## 2022-01-12 DIAGNOSIS — G56.02 CARPAL TUNNEL SYNDROME OF LEFT WRIST: ICD-10-CM

## 2022-01-12 DIAGNOSIS — Z23 NEED FOR VACCINATION: ICD-10-CM

## 2022-01-12 DIAGNOSIS — M25.562 CHRONIC PAIN OF LEFT KNEE: ICD-10-CM

## 2022-01-12 DIAGNOSIS — Z86.711 HISTORY OF PULMONARY EMBOLUS (PE): ICD-10-CM

## 2022-01-12 DIAGNOSIS — I35.8 AORTIC VALVE SCLEROSIS: ICD-10-CM

## 2022-01-12 DIAGNOSIS — G89.29 CHRONIC PAIN OF LEFT KNEE: ICD-10-CM

## 2022-01-12 DIAGNOSIS — Z00.00 MEDICARE ANNUAL WELLNESS VISIT, SUBSEQUENT: Primary | ICD-10-CM

## 2022-01-12 PROBLEM — I82.409 DVT (DEEP VENOUS THROMBOSIS) (HCC): Status: RESOLVED | Noted: 2019-11-05 | Resolved: 2022-01-12

## 2022-01-12 PROCEDURE — G0439 PPPS, SUBSEQ VISIT: HCPCS | Mod: 25 | Performed by: FAMILY MEDICINE

## 2022-01-12 PROCEDURE — G0008 ADMIN INFLUENZA VIRUS VAC: HCPCS | Performed by: FAMILY MEDICINE

## 2022-01-12 PROCEDURE — 90662 IIV NO PRSV INCREASED AG IM: CPT | Performed by: FAMILY MEDICINE

## 2022-01-12 RX ORDER — CHOLECALCIFEROL (VITAMIN D3) 25 MCG
TABLET,CHEWABLE ORAL
COMMUNITY
End: 2023-10-13

## 2022-01-12 RX ORDER — COVID-19 ANTIGEN TEST
KIT MISCELLANEOUS
COMMUNITY
End: 2023-03-21

## 2022-01-12 RX ORDER — DICLOFENAC SODIUM 30 MG/G
1 GEL TOPICAL 4 TIMES DAILY PRN
Qty: 100 G | Refills: 3 | Status: SHIPPED | OUTPATIENT
Start: 2022-01-12 | End: 2022-01-26

## 2022-01-12 ASSESSMENT — ACTIVITIES OF DAILY LIVING (ADL): BATHING_REQUIRES_ASSISTANCE: 0

## 2022-01-12 ASSESSMENT — PATIENT HEALTH QUESTIONNAIRE - PHQ9: CLINICAL INTERPRETATION OF PHQ2 SCORE: 0

## 2022-01-12 ASSESSMENT — ENCOUNTER SYMPTOMS: GENERAL WELL-BEING: GOOD

## 2022-01-12 ASSESSMENT — FIBROSIS 4 INDEX: FIB4 SCORE: 1.176696810829104192

## 2022-01-12 NOTE — PROGRESS NOTES
Chief Complaint   Patient presents with   • Annual Wellness Visit         HPI:  Regina is a 71 y.o. here for Medicare Annual Wellness Visit      Patient Active Problem List    Diagnosis Date Noted   • Mild dilation of ascending aorta (HCC) 02/04/2021   • Chronic anticoagulation 02/04/2021   • History of pulmonary embolus (PE) 02/04/2021   • History of DVT (deep vein thrombosis) 02/04/2021   • Varicose veins of leg with swelling, left 02/04/2021   • Dyslipidemia 02/04/2021   • Left ventricular hypertrophy by electrocardiogram 02/04/2021   • DVT of deep femoral vein, left (HCC) 02/18/2020   • Pulmonary nodules 11/17/2019   • History of pulmonary embolism 11/05/2019   • Osteophyte, right knee 06/03/2019   • Derangement of other medial meniscus due to old tear or injury, left knee 06/03/2019   • BMI 29.0-29.9,adult 06/27/2018   • Aortic valve sclerosis 05/22/2017   • Osteopenia 04/10/2017       Current Outpatient Medications   Medication Sig Dispense Refill   • Cyanocobalamin (B-12) 1000 MCG Cap Take  by mouth.     • Naproxen Sodium (ALEVE) 220 MG Cap Take  by mouth.     • Plant Sterol Stanol-Pantethine (CHOLESTOFF COMPLETE PO) Take  by mouth.     • Non Formulary Request wobenzym-joint relief one a day     • diclofenac sodium 3 % Gel Apply 1 Application topically 4 times a day as needed. 100 g 3   • aspirin EC (ECOTRIN) 81 MG Tablet Delayed Response Take 2 Tablets by mouth every day. 30 tablet      No current facility-administered medications for this visit.        Patient is taking medications as noted in medication list.  Current supplements as per medication list.     Allergies: Iodine and Penicillins    Current social contact/activities: Family/friends     Is patient current with immunizations? No, due for PNEUMOVAX (PPSV23). Patient is interested in receiving flu or pneumonia  today.    She  reports that she quit smoking about 45 years ago. Her smoking use included cigarettes. She smoked 0.00 packs per day. She  has never used smokeless tobacco. She reports current alcohol use. She reports that she does not use drugs.  Counseling given: Not Answered        DPA/Advanced directive: Patient has Advanced Directive on file.     ROS:    Gait: Uses no assistive device   Ostomy: No   Other tubes: No   Amputations: No   Chronic oxygen use No   Last eye exam: 1 year ago   Wears hearing aids: No   : Denies any urinary leakage during the last 6 months      Screening:        Depression Screening    Little interest or pleasure in doing things?  0 - not at all  Feeling down, depressed, or hopeless? 0 - not at all  Patient Health Questionnaire Score: 0    If depressive symptoms identified deferred to follow up visit unless specifically addressed in assessment and plan.    Interpretation of PHQ-9 Total Score   Score Severity   1-4 No Depression   5-9 Mild Depression   10-14 Moderate Depression   15-19 Moderately Severe Depression   20-27 Severe Depression    Screening for Cognitive Impairment    Three Minute Recall (captain, garden, picture)  3/3    Oswaldo clock face with all 12 numbers and set the hands to show 5 past 8.  Yes    If cognitive concerns identified, deferred for follow up unless specifically addressed in assessment and plan.    Fall Risk Assessment    Has the patient had two or more falls in the last year or any fall with injury in the last year?  No  If fall risk identified, deferred for follow up unless specifically addressed in assessment and plan.    Safety Assessment    Throw rugs on floor.  No  Handrails on all stairs.  Yes  Good lighting in all hallways.  Yes  Difficulty hearing.  No  Patient counseled about all safety risks that were identified.    Functional Assessment ADLs    Are there any barriers preventing you from cooking for yourself or meeting nutritional needs?  No.    Are there any barriers preventing you from driving safely or obtaining transportation?  No.    Are there any barriers preventing you from using  a telephone or calling for help?  No.    Are there any barriers preventing you from shopping?  No.    Are there any barriers preventing you from taking care of your own finances?  No.    Are there any barriers preventing you from managing your medications?  No.    Are there any barriers preventing you from showering, bathing or dressing yourself?  No.    Are you currently engaging in any exercise or physical activity?  Yes.  Walk, exercise bike   What is your perception of your health?  Good.    Health Maintenance Summary          Overdue - HEPATITIS C SCREENING (Once) Overdue - never done    No completion history exists for this topic.          Ordered - COLORECTAL CANCER SCREENING (COLON CANCER SCREENING ANNUAL FIT - Yearly) Ordered on 1/12/2022 02/01/2016  OCCULT BLOOD FECES IMMUNOASSAY          Ordered - MAMMOGRAM (Yearly) Ordered on 1/12/2022 04/07/2017  MA-MAMMO SCREENING BILAT W/VICTOR M W/CAD          Overdue - IMM PNEUMOCOCCAL VACCINE: 65+ Years (2 of 2 - PPSV23) Overdue since 3/21/2019    03/21/2018  Imm Admin: Pneumococcal Conjugate Vaccine (Prevnar/PCV-13)          Postponed - IMM ZOSTER VACCINES (2 of 2) Postponed until 6/12/2022    11/15/2019  Imm Admin: Zoster Vaccine Recombinant (RZV) (SHINGRIX)          BONE DENSITY (Every 5 Years) Tentatively due on 4/7/2022 04/07/2017  DS-BONE DENSITY STUDY (DEXA)          IMM DTaP/Tdap/Td Vaccine (4 - Td or Tdap) Next due on 3/26/2028    03/26/2018  Imm Admin: Tdap Vaccine    04/17/2009  Imm Admin: Tdap Vaccine    08/02/2007  Imm Admin: Tdap Vaccine          IMM HEP B VACCINE (Series Information) Aged Out    04/17/2009  Imm Admin: Hepatitis B Vaccine (Adol/Adult)          Annual Wellness Visit  Completed    01/12/2022  Visit Dx: Medicare annual wellness visit, subsequent    01/12/2022  Level of Service: ANNUAL WELLNESS VISIT-INCLUDES PPPS SUBSEQUE*          IMM INFLUENZA (Series Information) Completed    01/12/2022  Imm Admin: Influenza Vaccine Adult HD     10/24/2020  Imm Admin: Influenza Vaccine Adult HD    2019  Imm Admin: Influenza Vaccine Adult HD    10/14/2018  Imm Admin: Influenza Vaccine Adult HD    2012  Imm Admin: INFLUENZA TIV (IM)          IMM MENINGOCOCCAL VACCINE (MCV4) (Series Information) Aged Out    No completion history exists for this topic.          Discontinued - PAP SMEAR  Discontinued    No completion history exists for this topic.          Discontinued - COVID-19 Vaccine  Discontinued    No completion history exists for this topic.                Patient Care Team:  Sia Davis M.D. as PCP - General (Family Medicine)    Social History     Tobacco Use   • Smoking status: Former Smoker     Packs/day: 0.00     Types: Cigarettes     Quit date:      Years since quittin.0   • Smokeless tobacco: Never Used   Vaping Use   • Vaping Use: Never used   Substance Use Topics   • Alcohol use: Yes     Comment: occasional   • Drug use: No     Family History   Problem Relation Age of Onset   • Cancer Brother         lymphoma   • Other Brother         VTE    • Cancer Paternal Grandfather         glioblastoma     She  has a past medical history of Acute deep vein thrombosis (DVT) of left lower extremity (HCC) (2019), Arthritis, Ascending aorta dilation (HCC), Pain, and Pulmonary embolism (HCC) (2019).   Past Surgical History:   Procedure Laterality Date   • KNEE ARTHROSCOPY Left 10/16/2019    Procedure: ARTHROSCOPY, KNEE;  Surgeon: Bobo Booth M.D.;  Location: [facility];  Service: Orthopedics   • MENISCECTOMY, KNEE, MEDIAL Left 10/16/2019    Procedure: MENISCECTOMY, KNEE, MEDIAL- PARTIAL;  Surgeon: Bobo Booth M.D.;  Location: [facility];  Service: Orthopedics   • CHONDROPLASTY Left 10/16/2019    Procedure: CHONDROPLASTY- RAMIREZ;  Surgeon: Bobo Booth M.D.;  Location: [facility];  Service: Orthopedics   • RECONSTRUCTION, KNEE, ACL  2002    With patella tendon for repair   •  "ABDOMINAL HYSTERECTOMY TOTAL  1989    Precancerous Changes in Ovarian Cyst   • OVARIAN CYSTECTOMY  1988    left sided Precancerous Cyst---was pregnant at the time  Dr Mckeon   • APPENDECTOMY  1976   • TONSILLECTOMY AND ADENOIDECTOMY  1954           Exam:     /68 (BP Location: Right arm, Patient Position: Sitting, BP Cuff Size: Adult long)   Pulse 74   Temp 36.4 °C (97.6 °F) (Temporal)   Ht 1.575 m (5' 2\")   Wt 78 kg (172 lb)   SpO2 95%  Body mass index is 31.46 kg/m².    Hearing good.    Dentition good  Alert, oriented in no acute distress.  Eye contact is good, speech goal directed, affect calm      Assessment and Plan. The following treatment and monitoring plan is recommended:    1. Medicare annual wellness visit, subsequent     2. Carpal tunnel syndrome of left wrist  Pain, tingling, numbness left first three digits; brace at night has provided minimal relief, patient would like to discuss options with Dr. Perales  Referral to Hand Surgery   3. Chronic pain of left knee  S/p arthroscopic meniscal repair 2019, completed PT, recurrent pain, interested in steroid injection, scheduled 1/26/2022  DX-KNEE 3 VIEWS LEFT   4. Colon cancer screening  COLOGUARD COLON CANCER SCREENING    CANCELED: Referral to GI for Colonoscopy   5. Encounter for screening mammogram for malignant neoplasm of breast  MA-SCREENING MAMMO BILAT W/TOMOSYNTHESIS W/CAD   6. Dyslipidemia  Continue Cholest-off, Mediterranean type diet  Comp Metabolic Panel    Lipid Profile   7. Hyperglycemia  HEMOGLOBIN A1C   8. Aortic valve sclerosis  2/6 systolic murmur noted on exam; echo 2/2021   9. Need for vaccination  Influenza Vaccine, High Dose (65+ Only)   10. History of pulmonary embolus (PE)  DVT and PE following knee surgery; s/p 6 months anticoagulation         Services suggested: No services needed at this time  Health Care Screening recommendations as per orders if indicated.  Referrals offered: PT/OT/Nutrition counseling/Behavioral " Health/Smoking cessation as per orders if indicated.    Discussion today about general wellness and lifestyle habits:    · Prevent falls and reduce trip hazards; Cautioned about securing or removing rugs.  · Have a working fire alarm and carbon monoxide detector;   · Engage in regular physical activity and social activities       Follow-up: Return in about 1 week (around 1/19/2022) for steroid injection left knee.

## 2022-01-13 ENCOUNTER — DOCUMENTATION (OUTPATIENT)
Dept: VASCULAR LAB | Facility: MEDICAL CENTER | Age: 72
End: 2022-01-13

## 2022-01-13 NOTE — PROGRESS NOTES
Called pt to remind that the  echo  is due for surveillance. Scheduling office and our office phone numbers provided. JENNIFER Buckner.

## 2022-01-15 ENCOUNTER — PATIENT MESSAGE (OUTPATIENT)
Dept: MEDICAL GROUP | Facility: MEDICAL CENTER | Age: 72
End: 2022-01-15

## 2022-01-15 NOTE — LETTER
[facility]  Sia Davis M.D.  [address]  [place] [address]  Fax: [phone]   Authorization for Release/Disclosure of   Protected Health Information   Name: MICHELET BARNES : 1950 SSN: [id number]   Address: [address] [place] [address] Phone:    [phone] (home) [phone] (work)   I authorize the entity listed below to release/disclose the PHI below to:   [facility]/Sia Davis M.D. and Sia Davis M.D.   Provider or Entity Name:[facility]     Address   City, [facility], [facility]   Phone:      Fax: [phone]     Reason for request: continuity of care   Information to be released:    [  ] LAST COLONOSCOPY,  including any PATH REPORT and follow-up  [  ] LAST FIT/COLOGUARD RESULT [  ] LAST DEXA  [  ] LAST MAMMOGRAM  [  ] LAST PAP  [  ] LAST LABS [  ] RETINA EXAM REPORT  [  ] IMMUNIZATION RECORDS  [ xxx ]Recent Knee X-Ray      [  ] Check here and initial the line next to each item to release ALL health information INCLUDING  _____ Care and treatment for drug and / or alcohol abuse  _____ HIV testing, infection status, or AIDS  _____ Genetic Testing    DATES OF SERVICE OR TIME PERIOD TO BE DISCLOSED: _____________  I understand and acknowledge that:  * This Authorization may be revoked at any time by you in writing, except if your health information has already been used or disclosed.  * Your health information that will be used or disclosed as a result of you signing this authorization could be re-disclosed by the recipient. If this occurs, your re-disclosed health information may no longer be protected by State or Federal laws.  * You may refuse to sign this Authorization. Your refusal will not affect your ability to obtain treatment.  * This Authorization becomes effective upon signing and will  on (date) __________.      If no date is indicated, this Authorization will  one (1) year from the signature date.    Name: Michelet  Summer Cornell    Signature:Continuation of Care   Date:     1/17/2022       PLEASE FAX REQUESTED RECORDS BACK TO: [phone]

## 2022-01-17 ENCOUNTER — TELEPHONE (OUTPATIENT)
Dept: MEDICAL GROUP | Facility: MEDICAL CENTER | Age: 72
End: 2022-01-17

## 2022-01-17 NOTE — TELEPHONE ENCOUNTER
Pt. Pharmacy called to confirm prescription for   diclofenac sodium 3 % Gel. They want to confirm it is for the 3% and not 1%. Please advise...

## 2022-01-18 ENCOUNTER — TELEPHONE (OUTPATIENT)
Dept: MEDICAL GROUP | Facility: MEDICAL CENTER | Age: 72
End: 2022-01-18

## 2022-01-19 NOTE — TELEPHONE ENCOUNTER
MEDICATION PRIOR AUTHORIZATION NEEDED:    1. Name of Medication: diclofenac sodium 3 % Gel    2. Requested By (Name of Pharmacy): [facility]     3. Is insurance on file current? yes    4. What is the name & phone number of the 3rd party payor? [phone]    PAR Submitted 01/18/2022

## 2022-01-20 ENCOUNTER — PATIENT MESSAGE (OUTPATIENT)
Dept: MEDICAL GROUP | Facility: MEDICAL CENTER | Age: 72
End: 2022-01-20

## 2022-01-26 ENCOUNTER — OFFICE VISIT (OUTPATIENT)
Dept: MEDICAL GROUP | Facility: MEDICAL CENTER | Age: 72
End: 2022-01-26
Payer: MEDICARE

## 2022-01-26 VITALS
RESPIRATION RATE: 16 BRPM | BODY MASS INDEX: 31.81 KG/M2 | OXYGEN SATURATION: 95 % | TEMPERATURE: 97.8 F | HEIGHT: 62 IN | DIASTOLIC BLOOD PRESSURE: 64 MMHG | HEART RATE: 56 BPM | WEIGHT: 172.84 LBS | SYSTOLIC BLOOD PRESSURE: 116 MMHG

## 2022-01-26 DIAGNOSIS — Z23 NEED FOR VACCINATION: ICD-10-CM

## 2022-01-26 DIAGNOSIS — G89.29 CHRONIC PAIN OF LEFT KNEE: ICD-10-CM

## 2022-01-26 DIAGNOSIS — M25.562 CHRONIC PAIN OF LEFT KNEE: ICD-10-CM

## 2022-01-26 DIAGNOSIS — I77.810 MILD DILATION OF ASCENDING AORTA (HCC): ICD-10-CM

## 2022-01-26 PROCEDURE — G0009 ADMIN PNEUMOCOCCAL VACCINE: HCPCS | Performed by: FAMILY MEDICINE

## 2022-01-26 PROCEDURE — 90732 PPSV23 VACC 2 YRS+ SUBQ/IM: CPT | Performed by: FAMILY MEDICINE

## 2022-01-26 PROCEDURE — 99213 OFFICE O/P EST LOW 20 MIN: CPT | Mod: 25 | Performed by: FAMILY MEDICINE

## 2022-01-26 PROCEDURE — 20610 DRAIN/INJ JOINT/BURSA W/O US: CPT | Mod: 59 | Performed by: FAMILY MEDICINE

## 2022-01-26 RX ORDER — TRIAMCINOLONE ACETONIDE 40 MG/ML
40 INJECTION, SUSPENSION INTRA-ARTICULAR; INTRAMUSCULAR ONCE
Status: COMPLETED | OUTPATIENT
Start: 2022-01-26 | End: 2022-01-26

## 2022-01-26 RX ORDER — LIDOCAINE HYDROCHLORIDE 20 MG/ML
4 INJECTION, SOLUTION EPIDURAL; INFILTRATION; INTRACAUDAL; PERINEURAL ONCE
Status: COMPLETED | OUTPATIENT
Start: 2022-01-26 | End: 2022-01-26

## 2022-01-26 RX ADMIN — LIDOCAINE HYDROCHLORIDE 4 ML: 20 INJECTION, SOLUTION EPIDURAL; INFILTRATION; INTRACAUDAL; PERINEURAL at 14:14

## 2022-01-26 RX ADMIN — TRIAMCINOLONE ACETONIDE 40 MG: 40 INJECTION, SUSPENSION INTRA-ARTICULAR; INTRAMUSCULAR at 14:13

## 2022-01-26 ASSESSMENT — FIBROSIS 4 INDEX: FIB4 SCORE: 1.176696810829104192

## 2022-01-26 NOTE — ASSESSMENT & PLAN NOTE
Echocardiogram 2/10/21:   Compared to the images of the prior study done 11/6/2019 - the estimate   of right ventricular systolic pressure is increased, previously 25   mmHg.  Normal left ventricular systolic function.  Left ventricular ejection fraction is visually estimated to be 65%.  Normal right ventricular size and systolic function.  Mild mitral regurgitation.  Mild aortic insufficiency.  Mild tricuspid regurgitation.  Estimated right ventricular systolic pressure is 40 mmHg.  Ascending aorta is borderline dilated with a diameter of 4.0 cm.    One year surveillance echocardiogram ordered by Dr. Chapman, discussed with patient today, recommend patient proceed with echocardiogram.

## 2022-01-26 NOTE — ASSESSMENT & PLAN NOTE
Patient is s/p left knee arthroscopy by Dr. Booth 10/16/20. She completed physical therapy. She continues to have chronic pain in left knee and is interested in steroid injection.      X-ray completed at [facility] January 14, 2022: Tricompartmental degenerative changes, moderate medial femoral-tibial level    PROCEDURE NOTE:    Verbal and writtenconsent was obtained. Xray reviewed, risks, benefits, alternatives discussed.    Left knee: The joint was prepped and draped in usual sterile fashion. A 25 gauge needle was inserted into the anterior lateral aspect of the joint. 4 cc of 2% lidocaine without epi and 40 mg of kenalog (1 cc) was then injected into the joint. The area was cleaned with alcohol swab and band-aid was applied. Patient tolerated procedure well with no complications.

## 2022-01-26 NOTE — PROGRESS NOTES
Subjective:     CC: left knee injection    HPI:   Regina presents today with:    Chronic pain of left knee  Patient is s/p left knee arthroscopy by Dr. Booth 10/16/20. She completed physical therapy. She continues to have chronic pain in left knee and is interested in steroid injection.      X-ray completed at [facility] January 14, 2022: Tricompartmental degenerative changes, moderate medial femoral-tibial level    PROCEDURE NOTE:    Verbal and writtenconsent was obtained. Xray reviewed, risks, benefits, alternatives discussed.    Left knee: The joint was prepped and draped in usual sterile fashion. A 25 gauge needle was inserted into the anterior lateral aspect of the joint. 4 cc of 2% lidocaine without epi and 40 mg of kenalog (1 cc) was then injected into the joint. The area was cleaned with alcohol swab and band-aid was applied. Patient tolerated procedure well with no complications.     Mild dilation of ascending aorta (HCC)  Echocardiogram 2/10/21:   Compared to the images of the prior study done 11/6/2019 - the estimate   of right ventricular systolic pressure is increased, previously 25   mmHg.  Normal left ventricular systolic function.  Left ventricular ejection fraction is visually estimated to be 65%.  Normal right ventricular size and systolic function.  Mild mitral regurgitation.  Mild aortic insufficiency.  Mild tricuspid regurgitation.  Estimated right ventricular systolic pressure is 40 mmHg.  Ascending aorta is borderline dilated with a diameter of 4.0 cm.    One year surveillance echocardiogram ordered by Dr. Chapman, discussed with patient today, recommend patient proceed with echocardiogram.       Past Medical History:   Diagnosis Date   • Acute deep vein thrombosis (DVT) of left lower extremity (HCC) 11/2019   • Arthritis     knees   • Ascending aorta dilation (HCC)    • Pain     knees   • Pulmonary embolism (HCC) 11/2019       Social History     Tobacco Use   • Smoking  "status: Former Smoker     Packs/day: 0.00     Types: Cigarettes     Quit date:      Years since quittin.0   • Smokeless tobacco: Never Used   Vaping Use   • Vaping Use: Never used   Substance Use Topics   • Alcohol use: Yes     Comment: occasional   • Drug use: No       Current Outpatient Medications Ordered in Epic   Medication Sig Dispense Refill   • Diclofenac Sodium 1 % Cream Apply 1 Application topically 4 times a day. 120 g 3   • Cyanocobalamin (B-12) 1000 MCG Cap Take  by mouth.     • Naproxen Sodium (ALEVE) 220 MG Cap Take  by mouth.     • Plant Sterol Stanol-Pantethine (CHOLESTOFF COMPLETE PO) Take  by mouth.     • Non Formulary Request wobenzym-joint relief one a day     • aspirin EC (ECOTRIN) 81 MG Tablet Delayed Response Take 2 Tablets by mouth every day. 30 tablet      No current Epic-ordered facility-administered medications on file.       Allergies:  Iodine and Penicillins    Health Maintenance: UTD, hep C screening with next labs    ROS:  Gen: no fevers/chills, no changes in weight  Eyes: no changes in vision  ENT: no sore throat, no hearing loss, no bloody nose  Pulm: no SOB  CV: no chest pain      Objective:       Exam:  /64 (BP Location: Left arm, Patient Position: Sitting, BP Cuff Size: Adult long)   Pulse (!) 56   Temp 36.6 °C (97.8 °F) (Temporal)   Resp 16   Ht 1.575 m (5' 2\")   Wt 78.4 kg (172 lb 13.5 oz)   SpO2 95%   BMI 31.61 kg/m²  Body mass index is 31.61 kg/m².    Gen: Alert and oriented, No apparent distress  Lungs: Normal effort, CTA bilaterally, no wheezes, rhonchi, or rales  CV: Regular rate and rhythm, 2/6 systolic murmur      Assessment & Plan:     71 y.o. female with the following -     1. Chronic pain of left knee  - Consent for all Surgical, Special Diagnostic or Therapeutic Procedures  - triamcinolone acetonide (KENALOG-40) injection 40 mg  - lidocaine PF (XYLOCAINE-MPF) 2 % injection PF 4 mL    2. Need for vaccination  - Pneumococal Polysaccharide Vaccine " 23-Valent =>1YO SQ/IM    3. Mild dilation of ascending aorta (HCC)  - recommended patient proceed with surveillance echocardiogram, active order in Epic confirmed, patient has imaging scheduling phone number    Return in about 6 months (around 7/26/2022).    Please note this dictation was created using voice recognition software. I have made every reasonable attempt to correct obvious errors, but I expect there may be errors of grammar, and possibly content, that I did not discover before finalizing the note.

## 2022-03-24 ENCOUNTER — HOSPITAL ENCOUNTER (OUTPATIENT)
Dept: CARDIOLOGY | Facility: MEDICAL CENTER | Age: 72
End: 2022-03-24
Attending: NURSE PRACTITIONER
Payer: MEDICARE

## 2022-03-24 DIAGNOSIS — I71.21 ASCENDING AORTIC ANEURYSM (HCC): ICD-10-CM

## 2022-03-24 LAB
LV EJECT FRACT  99904: 65
LV EJECT FRACT MOD 2C 99903: 68.35
LV EJECT FRACT MOD 4C 99902: 75.66
LV EJECT FRACT MOD BP 99901: 71.91

## 2022-03-24 PROCEDURE — 93306 TTE W/DOPPLER COMPLETE: CPT

## 2022-03-24 PROCEDURE — 93306 TTE W/DOPPLER COMPLETE: CPT | Mod: 26 | Performed by: INTERNAL MEDICINE

## 2022-03-28 ENCOUNTER — DOCUMENTATION (OUTPATIENT)
Dept: VASCULAR LAB | Facility: MEDICAL CENTER | Age: 72
End: 2022-03-28
Payer: MEDICARE

## 2022-03-29 NOTE — PROGRESS NOTES
Echo with difficult views of ascending aorta but appears to be stable at 4 to 4.2 cm.  Normal EF  No significant aortic valve disease    Patient overdue for vascular stent follow-up.  We will ask MA to contact patient to schedule  Anticipate repeat CTA chest in 1 year  APN to update surveillance calendar    Michael Bloch, MD  Vascular Medicine

## 2022-04-05 ENCOUNTER — TELEPHONE (OUTPATIENT)
Dept: VASCULAR LAB | Facility: MEDICAL CENTER | Age: 72
End: 2022-04-05
Payer: MEDICARE

## 2022-04-05 NOTE — TELEPHONE ENCOUNTER
Called patient and relayed information below. Scheduled a f/u visit with Dr. Chapman.    ----- Message from Michael J Bloch, M.D. sent at 3/28/2022  6:41 PM PDT -----  Regarding: RE: deon Mckay-Dhaval echo was done.  Put on calendar for CTA chest in 1 year    Gulshan -let patient know that the echo is somewhat technically limited but her aneurysm appears stable.  We will keep an eye on it.  She is overdue for vascular follow-up.  Please make follow-up appointment to see Ari      ----- Message -----  From: QUINTON Buckner  Sent: 3/28/2022   2:38 PM PDT  To: Michael J Bloch, M.D.  Subject: echo                                             Scan is in the chart. Thanks QUINTON Buckner

## 2022-04-13 ENCOUNTER — APPOINTMENT (RX ONLY)
Dept: URBAN - METROPOLITAN AREA CLINIC 6 | Facility: CLINIC | Age: 72
Setting detail: DERMATOLOGY
End: 2022-04-13

## 2022-04-13 DIAGNOSIS — L81.4 OTHER MELANIN HYPERPIGMENTATION: ICD-10-CM

## 2022-04-13 DIAGNOSIS — L57.0 ACTINIC KERATOSIS: ICD-10-CM

## 2022-04-13 DIAGNOSIS — D485 NEOPLASM OF UNCERTAIN BEHAVIOR OF SKIN: ICD-10-CM

## 2022-04-13 DIAGNOSIS — Z71.89 OTHER SPECIFIED COUNSELING: ICD-10-CM

## 2022-04-13 DIAGNOSIS — L82.1 OTHER SEBORRHEIC KERATOSIS: ICD-10-CM

## 2022-04-13 DIAGNOSIS — D18.0 HEMANGIOMA: ICD-10-CM

## 2022-04-13 DIAGNOSIS — D22 MELANOCYTIC NEVI: ICD-10-CM

## 2022-04-13 PROBLEM — D22.5 MELANOCYTIC NEVI OF TRUNK: Status: ACTIVE | Noted: 2022-04-13

## 2022-04-13 PROBLEM — D18.01 HEMANGIOMA OF SKIN AND SUBCUTANEOUS TISSUE: Status: ACTIVE | Noted: 2022-04-13

## 2022-04-13 PROBLEM — D48.5 NEOPLASM OF UNCERTAIN BEHAVIOR OF SKIN: Status: ACTIVE | Noted: 2022-04-13

## 2022-04-13 PROBLEM — D23.72 OTHER BENIGN NEOPLASM OF SKIN OF LEFT LOWER LIMB, INCLUDING HIP: Status: ACTIVE | Noted: 2022-04-13

## 2022-04-13 PROCEDURE — ? LIQUID NITROGEN

## 2022-04-13 PROCEDURE — 11102 TANGNTL BX SKIN SINGLE LES: CPT

## 2022-04-13 PROCEDURE — ? PRESCRIPTION

## 2022-04-13 PROCEDURE — 17003 DESTRUCT PREMALG LES 2-14: CPT

## 2022-04-13 PROCEDURE — 99203 OFFICE O/P NEW LOW 30 MIN: CPT | Mod: 25

## 2022-04-13 PROCEDURE — ? BIOPSY BY SHAVE METHOD

## 2022-04-13 PROCEDURE — ? COUNSELING

## 2022-04-13 PROCEDURE — 17000 DESTRUCT PREMALG LESION: CPT | Mod: 59

## 2022-04-13 RX ORDER — FLUOROURACIL 2 G/40G
CREAM TOPICAL BID
Qty: 40 | Refills: 0 | Status: ERX

## 2022-04-13 ASSESSMENT — LOCATION DETAILED DESCRIPTION DERM
LOCATION DETAILED: RIGHT VENTRAL PROXIMAL FOREARM
LOCATION DETAILED: LEFT MID-UPPER BACK
LOCATION DETAILED: LEFT ANTERIOR DISTAL THIGH
LOCATION DETAILED: RIGHT INFERIOR FOREHEAD
LOCATION DETAILED: LEFT PROXIMAL CALF
LOCATION DETAILED: RIGHT MID-UPPER BACK
LOCATION DETAILED: RIGHT ANTERIOR PROXIMAL UPPER ARM
LOCATION DETAILED: LEFT VENTRAL PROXIMAL FOREARM
LOCATION DETAILED: LEFT INFERIOR FOREHEAD
LOCATION DETAILED: LEFT INFERIOR MEDIAL MIDBACK
LOCATION DETAILED: INFERIOR MID FOREHEAD
LOCATION DETAILED: UPPER STERNUM
LOCATION DETAILED: LEFT ANTERIOR PROXIMAL UPPER ARM
LOCATION DETAILED: SUPERIOR LUMBAR SPINE
LOCATION DETAILED: RIGHT SUPERIOR MEDIAL UPPER BACK
LOCATION DETAILED: RIGHT SUPERIOR UPPER BACK
LOCATION DETAILED: EPIGASTRIC SKIN
LOCATION DETAILED: RIGHT INFERIOR CENTRAL MALAR CHEEK
LOCATION DETAILED: RIGHT INFERIOR MEDIAL MIDBACK
LOCATION DETAILED: RIGHT ANTERIOR DISTAL THIGH
LOCATION DETAILED: LEFT CENTRAL MALAR CHEEK

## 2022-04-13 ASSESSMENT — LOCATION SIMPLE DESCRIPTION DERM
LOCATION SIMPLE: RIGHT UPPER BACK
LOCATION SIMPLE: LEFT UPPER ARM
LOCATION SIMPLE: LEFT THIGH
LOCATION SIMPLE: LEFT LOWER BACK
LOCATION SIMPLE: ABDOMEN
LOCATION SIMPLE: RIGHT UPPER ARM
LOCATION SIMPLE: RIGHT FOREHEAD
LOCATION SIMPLE: LEFT CHEEK
LOCATION SIMPLE: LEFT FOREARM
LOCATION SIMPLE: RIGHT FOREARM
LOCATION SIMPLE: LOWER BACK
LOCATION SIMPLE: RIGHT CHEEK
LOCATION SIMPLE: RIGHT LOWER BACK
LOCATION SIMPLE: CHEST
LOCATION SIMPLE: LEFT CALF
LOCATION SIMPLE: LEFT UPPER BACK
LOCATION SIMPLE: LEFT FOREHEAD
LOCATION SIMPLE: INFERIOR FOREHEAD
LOCATION SIMPLE: RIGHT THIGH

## 2022-04-13 ASSESSMENT — LOCATION ZONE DERM
LOCATION ZONE: TRUNK
LOCATION ZONE: FACE
LOCATION ZONE: LEG
LOCATION ZONE: ARM

## 2022-04-13 NOTE — PROCEDURE: BIOPSY BY SHAVE METHOD

## 2022-04-18 ENCOUNTER — RX ONLY (OUTPATIENT)
Age: 72
Setting detail: RX ONLY
End: 2022-04-18

## 2022-04-18 RX ORDER — FLUOROURACIL 2 G/40G
CREAM TOPICAL
Qty: 40 | Refills: 0 | Status: ERX

## 2022-07-06 ENCOUNTER — HOSPITAL ENCOUNTER (OUTPATIENT)
Dept: LAB | Facility: MEDICAL CENTER | Age: 72
End: 2022-07-06
Attending: FAMILY MEDICINE
Payer: MEDICARE

## 2022-07-06 DIAGNOSIS — E78.5 DYSLIPIDEMIA: ICD-10-CM

## 2022-07-06 DIAGNOSIS — R73.9 HYPERGLYCEMIA: ICD-10-CM

## 2022-07-06 LAB
ALBUMIN SERPL BCP-MCNC: 4.5 G/DL (ref 3.2–4.9)
ALBUMIN/GLOB SERPL: 2 G/DL
ALP SERPL-CCNC: 91 U/L (ref 30–99)
ALT SERPL-CCNC: 19 U/L (ref 2–50)
ANION GAP SERPL CALC-SCNC: 12 MMOL/L (ref 7–16)
AST SERPL-CCNC: 15 U/L (ref 12–45)
BILIRUB SERPL-MCNC: 0.7 MG/DL (ref 0.1–1.5)
BUN SERPL-MCNC: 12 MG/DL (ref 8–22)
CALCIUM SERPL-MCNC: 9 MG/DL (ref 8.5–10.5)
CHLORIDE SERPL-SCNC: 109 MMOL/L (ref 96–112)
CHOLEST SERPL-MCNC: 248 MG/DL (ref 100–199)
CO2 SERPL-SCNC: 23 MMOL/L (ref 20–33)
CREAT SERPL-MCNC: 0.51 MG/DL (ref 0.5–1.4)
EST. AVERAGE GLUCOSE BLD GHB EST-MCNC: 126 MG/DL
FASTING STATUS PATIENT QL REPORTED: NORMAL
GFR SERPLBLD CREATININE-BSD FMLA CKD-EPI: 99 ML/MIN/1.73 M 2
GLOBULIN SER CALC-MCNC: 2.2 G/DL (ref 1.9–3.5)
GLUCOSE SERPL-MCNC: 94 MG/DL (ref 65–99)
HBA1C MFR BLD: 6 % (ref 4–5.6)
HDLC SERPL-MCNC: 75 MG/DL
LDLC SERPL CALC-MCNC: 158 MG/DL
POTASSIUM SERPL-SCNC: 4 MMOL/L (ref 3.6–5.5)
PROT SERPL-MCNC: 6.7 G/DL (ref 6–8.2)
SODIUM SERPL-SCNC: 144 MMOL/L (ref 135–145)
TRIGL SERPL-MCNC: 73 MG/DL (ref 0–149)

## 2022-07-06 PROCEDURE — 83036 HEMOGLOBIN GLYCOSYLATED A1C: CPT | Mod: GA

## 2022-07-06 PROCEDURE — 80061 LIPID PANEL: CPT

## 2022-07-06 PROCEDURE — 80053 COMPREHEN METABOLIC PANEL: CPT

## 2022-07-06 PROCEDURE — 36415 COLL VENOUS BLD VENIPUNCTURE: CPT | Mod: GA

## 2022-07-08 ENCOUNTER — OFFICE VISIT (OUTPATIENT)
Dept: VASCULAR LAB | Facility: MEDICAL CENTER | Age: 72
End: 2022-07-08
Attending: FAMILY MEDICINE
Payer: MEDICARE

## 2022-07-08 VITALS
HEIGHT: 62 IN | SYSTOLIC BLOOD PRESSURE: 156 MMHG | WEIGHT: 168.8 LBS | DIASTOLIC BLOOD PRESSURE: 78 MMHG | HEART RATE: 52 BPM | BODY MASS INDEX: 31.06 KG/M2

## 2022-07-08 DIAGNOSIS — I83.892 VARICOSE VEINS OF LEG WITH SWELLING, LEFT: ICD-10-CM

## 2022-07-08 DIAGNOSIS — Z86.718 HISTORY OF DVT (DEEP VEIN THROMBOSIS): ICD-10-CM

## 2022-07-08 DIAGNOSIS — E78.5 DYSLIPIDEMIA: ICD-10-CM

## 2022-07-08 DIAGNOSIS — Z86.711 HISTORY OF PULMONARY EMBOLUS (PE): ICD-10-CM

## 2022-07-08 DIAGNOSIS — I27.20 PULMONARY HYPERTENSION (HCC): ICD-10-CM

## 2022-07-08 DIAGNOSIS — I71.21 ASCENDING AORTIC ANEURYSM (HCC): ICD-10-CM

## 2022-07-08 PROCEDURE — 99214 OFFICE O/P EST MOD 30 MIN: CPT | Performed by: FAMILY MEDICINE

## 2022-07-08 PROCEDURE — 99212 OFFICE O/P EST SF 10 MIN: CPT

## 2022-07-08 RX ORDER — TRAMADOL HYDROCHLORIDE 50 MG/1
TABLET ORAL
COMMUNITY
Start: 2022-04-29 | End: 2023-03-21

## 2022-07-08 RX ORDER — FLUOROURACIL 50 MG/G
CREAM TOPICAL
COMMUNITY
Start: 2022-04-18

## 2022-07-08 RX ORDER — ROSUVASTATIN CALCIUM 10 MG/1
10 TABLET, COATED ORAL EVERY EVENING
Qty: 90 TABLET | Refills: 3 | Status: SHIPPED | OUTPATIENT
Start: 2022-07-08 | End: 2022-07-08 | Stop reason: SDUPTHER

## 2022-07-08 RX ORDER — ROSUVASTATIN CALCIUM 10 MG/1
10 TABLET, COATED ORAL EVERY EVENING
Qty: 90 TABLET | Refills: 3 | Status: SHIPPED | OUTPATIENT
Start: 2022-07-08 | End: 2022-07-21 | Stop reason: SINTOL

## 2022-07-08 RX ORDER — ASPIRIN 81 MG/1
TABLET ORAL
COMMUNITY
End: 2022-07-08

## 2022-07-08 ASSESSMENT — FIBROSIS 4 INDEX: FIB4 SCORE: 1.032370802417527947

## 2022-07-08 ASSESSMENT — ENCOUNTER SYMPTOMS
CLAUDICATION: 0
CHILLS: 0
COUGH: 0
NAUSEA: 0
PALPITATIONS: 0
ORTHOPNEA: 0
FEVER: 0
WEAKNESS: 0
MYALGIAS: 0
BRUISES/BLEEDS EASILY: 0

## 2022-07-08 NOTE — PROGRESS NOTES
Follow- up VASCULAR ANTICOAGULATION VISIT  Regina Cornell is a  female who presents 07/08/22  for   Chief Complaint   Patient presents with   • Follow-Up     Initially referred by Sia Davis MD for length of therapy (LOT) determination and management of anticoagulation in context of acute venothromboembolic disease    Subjective    last seen 3/2021     Thoracic Aortic Aneurysm  Type: ascending   Current/interval concerns: none  Current sx: denies chest, back, abdominal pain, SOB, dysphagia, worsening cough, hemoptysis.    VTE disease / Anticoagulation:   Current symptoms: none   Current antithrombotic agent:  ASA 162mg daily   Complications: none, tolerating well, no bleeding or bruising   Date of initiation of anticoagulation: 11/2019, cessation 2/2021   Adherence: reports complete, no missed doses     Pertinent VTE pmhx:   Date of Diagnosis: cannot recall exact date 10/30/19  Type of Venous thromboembolic disease (VTE): acute LLE DVT  Type of imaging: duplex, CTPA - repeated 5/2020  Preceding/presenting symptoms: LLE swelling during post-op period after knee surgery - approximately 2 weeks postop had initial duplex, started on eliquis and, despite compliant therapy, then developed acute SOB, CP and found to have PE, switched to VKA as deemed eliquis failure   Antithrombotic therapy at time of VTE event: no  VTE tx course: initiated on Eliquis but then noted to have new onset SOB with CTPA showing possible new PE, transitioned to VKA since   Any personal VTE hx? No  Any family VTE hx? No   UNPROVOKED VS PROVOKED:   Recent surgery ? Yes, Details: 10/16/20 - L knee athroscopy with Dr. Booth  Smoker?  reports that she quit smoking about 45 years ago. Her smoking use included cigarettes. She smoked 0.00 packs per day. She has never used smokeless tobacco.    Other periods of immobility? Yes, Details: post-op     Hyperlipidemia:   No current meds   Current treatment: none      HTN:  Current HTN concerns: No  specific concerns since last visit   Adherence to current HTN meds: compliant all of the time    home BP:  Consistently 126/70s , known WCE         Current Outpatient Medications:   •  fluorouracil, APPLY TWICE DAILY TO SPOT ON FACE FOR TWO WEEKS., PRN  •  traMADol, , PRN  •  diclofenac sodium, APPLY 2 GRAMS TO AFFECTED AREA 4 TIMES A DAY., PRN  •  rosuvastatin, 10 mg, Oral, Q EVENING  •  Diclofenac Sodium, 1 Application, Apply externally, 4X/DAY, PRN  •  B-12, Take  by mouth., Taking  •  Naproxen Sodium, Take  by mouth., Taking  •  Plant Sterol Stanol-Pantethine (CHOLESTOFF COMPLETE PO), Take  by mouth., Taking  •  Non Formulary Request, wobenzym-joint relief one a day, PRN  •  aspirin EC, 162 mg, Oral, DAILY, Taking     Social History     Tobacco Use   • Smoking status: Former Smoker     Packs/day: 0.00     Types: Cigarettes     Quit date:      Years since quittin.5   • Smokeless tobacco: Never Used   Vaping Use   • Vaping Use: Never used   Substance Use Topics   • Alcohol use: Yes     Comment: occasional   • Drug use: No     DIET AND EXERCISE:  Weight Change:stable   BMI Readings from Last 5 Encounters:   22 30.87 kg/m²   22 31.61 kg/m²   22 31.46 kg/m²   21 31.46 kg/m²   21 31.61 kg/m²     Diet: common adult  Exercise: no regular exercise program     Review of Systems   Constitutional: Negative for chills and fever.   Respiratory: Negative for cough.    Cardiovascular: Negative for chest pain, palpitations, orthopnea, claudication and leg swelling.   Gastrointestinal: Negative for nausea.   Musculoskeletal: Negative for myalgias.   Neurological: Negative for weakness.   Endo/Heme/Allergies: Does not bruise/bleed easily.        Objective     Vitals:    22 1029 22 1032   BP: (!) 166/76 (!) 156/78   BP Location: Right arm Right arm   Patient Position: Sitting Sitting   BP Cuff Size: Adult Adult   Pulse: (!) 52 (!) 52   Weight: 76.6 kg (168 lb 12.8 oz)    Height:  "1.575 m (5' 2\")       BP Readings from Last 5 Encounters:   07/08/22 (!) 156/78   01/26/22 116/64   01/12/22 124/68   03/11/21 137/69   02/04/21 140/68      Body mass index is 30.87 kg/m².     Physical Exam  Vitals reviewed.   Constitutional:       General: She is not in acute distress.     Appearance: Normal appearance.   HENT:      Head: Normocephalic and atraumatic.      Nose: Nose normal.      Mouth/Throat:      Mouth: Mucous membranes are dry.      Pharynx: Oropharynx is clear.   Eyes:      General: Lids are normal.      Extraocular Movements: Extraocular movements intact.      Conjunctiva/sclera: Conjunctivae normal.   Neck:      Thyroid: No thyroid mass.      Vascular: Normal carotid pulses. No carotid bruit.      Trachea: Trachea normal.   Cardiovascular:      Rate and Rhythm: Normal rate and regular rhythm.      Chest Wall: PMI is not displaced.      Pulses: Normal pulses.           Carotid pulses are 2+ on the right side and 2+ on the left side.       Radial pulses are 2+ on the right side and 2+ on the left side.        Dorsalis pedis pulses are 2+ on the right side and 2+ on the left side.        Posterior tibial pulses are 2+ on the right side and 2+ on the left side.      Heart sounds: Normal heart sounds.      Comments:    Spider telangectasia:       RLE:  None      LLE: none   Varicosities:           RLE: none      LLE: none   Corona phlebectatica:      RLE:  None        LLE:  None   Cording:         RLE:  None     LLE: None     Pulmonary:      Effort: Pulmonary effort is normal.      Breath sounds: Normal breath sounds.   Abdominal:      General: Abdomen is flat. Bowel sounds are normal.      Palpations: Abdomen is soft.   Musculoskeletal:      Cervical back: Full passive range of motion without pain and neck supple.      Right lower leg: No edema.      Left lower leg: No edema.   Skin:     General: Skin is warm and dry.      Capillary Refill: Capillary refill takes less than 2 seconds.      " Coloration: Skin is not cyanotic.      Nails: There is no clubbing.   Neurological:      General: No focal deficit present.      Mental Status: She is alert and oriented to person, place, and time. Mental status is at baseline.      Cranial Nerves: Cranial nerves are intact.      Coordination: Coordination is intact.      Gait: Gait is intact.   Psychiatric:         Mood and Affect: Mood normal.         Behavior: Behavior normal.       Lab Results   Component Value Date/Time    CHOLSTRLTOT 248 (H) 07/06/2022 07:46 AM     (H) 07/06/2022 07:46 AM    HDL 75 07/06/2022 07:46 AM    TRIGLYCERIDE 73 07/06/2022 07:46 AM       Lab Results   Component Value Date/Time    SODIUM 144 07/06/2022 07:46 AM    POTASSIUM 4.0 07/06/2022 07:46 AM    CHLORIDE 109 07/06/2022 07:46 AM    CO2 23 07/06/2022 07:46 AM    GLUCOSE 94 07/06/2022 07:46 AM    BUN 12 07/06/2022 07:46 AM    CREATININE 0.51 07/06/2022 07:46 AM    CREATININE 0.9 10/05/2007 12:50 PM     Lab Results   Component Value Date/Time    ALKPHOSPHAT 91 07/06/2022 07:46 AM    ASTSGOT 15 07/06/2022 07:46 AM    ALTSGPT 19 07/06/2022 07:46 AM    TBILIRUBIN 0.7 07/06/2022 07:46 AM       Lab Results   Component Value Date    CHOLSTRLTOT 248 (H) 07/06/2022     (H) 07/06/2022    HDL 75 07/06/2022    TRIGLYCERIDE 73 07/06/2022      Lab Results   Component Value Date    INR 2.80 02/04/2021       Lab Results   Component Value Date    HBA1C 6.0 (H) 07/06/2022      Lab Results   Component Value Date    SODIUM 144 07/06/2022    POTASSIUM 4.0 07/06/2022    CHLORIDE 109 07/06/2022    CO2 23 07/06/2022    GLUCOSE 94 07/06/2022    BUN 12 07/06/2022    CREATININE 0.51 07/06/2022    IFAFRICA >60 02/12/2021    IFNOTAFR >60 02/12/2021        Lab Results   Component Value Date    WBC 5.2 02/12/2021    RBC 5.15 02/12/2021    HEMOGLOBIN 15.4 02/12/2021    HEMATOCRIT 48.6 (H) 02/12/2021    MCV 94.4 02/12/2021    MCH 29.9 02/12/2021    MCHC 31.7 (L) 02/12/2021    MPV 10.7 02/12/2021       VASCULAR IMAGING:     Last EKG:   Results for orders placed or performed during the hospital encounter of 19   EKG   Result Value Ref Range    Report       [facility] Emergency Dept.    Test Date:  2019  Pt Name:    MICHELET BARNES            Department: [facility]  MRN:        [id number]                      Room:  Gender:     Female                       Technician: [id number]  :        [id number]                   Requested By:ER TRIAGE PROTOCOL  Order #:    [id number]                    Reading MD:    Measurements  Intervals                                Axis  Rate:       54                           P:          41  CT:         160                          QRS:        -4  QRSD:       87                           T:          10  QT:         437  QTc:        415    Interpretive Statements  Sinus rhythm  Probable left atrial enlargement  Probable left ventricular hypertrophy  Borderline T abnormalities, anterior leads  Compared to ECG 10/03/2019 14:34:29  T-wave abnormality now present       Echo 2017  No prior study is available for comparison.   Normal left ventricular systolic function.  Normal left ventricular wall thickness.  Mild aortic stenosis.  Mild tricuspid regurgitation.  Estimated right ventricular systolic pressure is 45 mmHg.  No prior study is available for comparison.   Aorta  Ascending aorta is dilated with a diameter of 4.1 cm.    LLE venous 2019    Venous duplex imaging was performed in only the left lower extremity    including serial compressions and spectral Doppler flow evaluation.   Partially compressible left femoral vein mid & distal with subacute    appearing softly echogenic material (thrombus) in the lumen. Incompressible    left popliteal and gastrocnemius veins with subacute appearing mixed    echogenic material (thrombus) in the lumen.    Partially compressible left peroneal vein with subacute appearing thrombus    in the lumen.    Echo  11/6/19  Prior Echo - 5/19/17.  Normal left ventricular systolic function.   No evidence of valvular abnormality based on Doppler evaluation.   Compared to the images of the prior study done -  there has been no   significant change.   Ascending aorta is dilated with a diameter of 4.0 cm.    CTPA 11/5/19  1.  Isolated pulmonary embolus is identified in superior segment right lower pulmonary lobe.   2.  RV LV ratio is elevated.   3.  There are 2 right lung pulmonary nodules. Largest measures 9 mm in diameter. Recommend follow-up CT in 3-6 months.    LLE venous 5/22/20    Chronic, partial thrombus with flow recanalization in the LEFT popliteal    vein. Chronic, occlusive thrombus in the LEFT peroneal trunk vein.     Compared to the images of the duplex dated 11/6/19 - there has been an    evolution of the DVT.    CXR 2/2021   No acute cardiopulmonary findings.    VQ scan 2/2021    Low probability of pulmonary embolism.    Echo 2/2021   Compared to the images of the prior study done 11/6/2019 - the estimate   of right ventricular systolic pressure is increased, previously 25   mmHg.  Normal left ventricular systolic function.  Left ventricular ejection fraction is visually estimated to be 65%.  Normal right ventricular size and systolic function.  Mild mitral regurgitation.  Mild aortic insufficiency.  Mild tricuspid regurgitation.  Estimated right ventricular systolic pressure is 40 mmHg.  Ascending aorta is borderline dilated with a diameter of 4.0 cm.    Echo 3/2022   Aorta  Normal aortic root for body surface area. The ascending aorta is   dilated with a diameter of 4-4.2 cm.  The arch is normal size.  The left ventricular ejection fraction is visually estimated to be 65-  70%.  Aortic valve sclerosis without significant stenosis.  Estimated right ventricular systolic pressure is 32-35 mmHg.  The ascending aorta is dilated with a diameter of 4-4.2 cm, difficult   to see borders in some views, difficult to see  borders in some views.   Compared to the prior echo on 02/10/21, probably no significant change.           Medical Decision Making:  Today's Assessment / Status / Plan:     1. Ascending aortic aneurysm (HCC)  Referral to Vascular Medicine   2. History of DVT (deep vein thrombosis)     3. Dyslipidemia  Comp Metabolic Panel    Lipid Profile    rosuvastatin (CRESTOR) 10 MG Tab    DISCONTINUED: rosuvastatin (CRESTOR) 10 MG Tab   4. History of pulmonary embolus (PE)     5. Varicose veins of leg with swelling, left     6. 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   Echo shows no biscupid Ao valve  No fhx of pmhx of connective tissue disease  - reviewed anatomy, risks, emergency s/s requiring immediate attention and gave handouts.   -  Check CTA chest 1 yr (3/2023), then repeat Q2yr echo is stable and CTA every 5yr  - focus on ARB and BB for BP control if HTN developed   - activity restrictions including no extreme endurance activities, smoking, stimulants, and extreme weight lifting >20lbs     2) VTE disease - stable    -acute LLE DVT, 11/2019  - acute PE, 11/2019  - see OAC plan, no further imaging     ANTITHROMBOTIC THERAPY:  Anti-Platelet/Anti-Coagulant Tx recommended: yes  Indication: hx of surg-associated DVT/PE - 11/2019   Date of initiation: 11/2019   HAS-BLED bleeding risk calc ([email or web address]): 1 pt, 3.4%, low risk  Thrombophilia/hypercoag evaluation:  not recommended  Factors to consider for indefinite OAC: None   Last CBC, BMP: 2019  Expected duration: completed VKA 2/2021   CXR, echo, VQ, DDimer negative 2/2021   Antithrombotic therapy plan:  - continue ASA 162mg indefinitely   --continue knee-high compression socks, 20-30mmHg, as much as tolerated   "  - increase walking, avoid prolonged standing   - elevated legs while sitting and sleeping above heart level  - reduce sodium (\"salt\") in diet to less than 2,000mg daily   - counseled on signs and symptoms of acute VTE that require seeking prompt attention to include shortness of breath, chest pain, pain with deep inhalation, acute leg swelling and/or pain in calf or leg   - elevate legs as much as possible, use compression stockings/socks if directed by your provider  - Avoid hormonal therapies including estrogen or testosterone-containing meds, or raloxifene or tamoxifene (commonly used for osteoporosis)  - Avoid sedentary periods  - continue complete avoidance of tobacco products  - if having any invasive procedure,please make sure the doctor knows of your history of blood clots and current anticoagulation status    LIPID MANAGEMENT:   Qualifies for Statin Therapy Based on 2018 ACC/AHA Guidelines: yes, Primary Prevention - 40-74yo, LDLc >70, <190 w/o DM  The 10-year ASCVD risk score (Stevie MUÑOZ Jr., et al., 2013) is: 17%  7.5 - <20% \"intermediate risk\" .   Major ASCVD events: None   High-risk conditions: N/A  Risk-enhancers: None  Currently on Statin:  Started   - worsening LDL-C over time from 127 to 158   Statin therapy can reduce risk of recurrent VTE if off OAC   Treatment goals: LDL-C <100 (consider non-HDL-C <130, apoB <90)   At goal? No   Plan:   - reinforced ongoing TLC measures as noted   - update labs   Meds:  Start rosuva 10mg     BLOOD PRESSURE MANAGEMENT:  ACC/AHA (2017) goal <130/80  Home BP at goal: ???  Office BP at goal:  No, mild high SBP   Indications of end organ damage: dilated Ascending Ao, LVH per EKG   Device candidate? No  Indicated medications for reduction of expansion rate of TAA:   -Beta-blocker (decreases pulsatile wall stress)   -Losartan (inhibits tissue growth factor beta, decreasing aneurysmal growth rate or rate of expansion)  Plan:   Monitoring:   - start/continue home BP " monitoring, reviewed correct technique, provide BP log and instructions  - order 24h ABPM:  UNDECIDED  - monitor lytes/gfr routinely   - contact office if BP consistently >140/>90 to discussion of tx adjustments   Medications:  Consider losartan as 1st line   DHP-CCB: none   Thiazide: none   Tom-receptor Antagonist: not indicated at this time   BB:  Metoprolol as 2nd line     GLYCEMIC STATUS:  Normal    LIFESTYLE RECOMMENDATIONS:     Smoking:  reports that she quit smoking about 45 years ago. Her smoking use included cigarettes. She smoked 0.00 packs per day. She has never used smokeless tobacco.   - continued complete avoidance of all tobacco products     Physical Activity: continue healthy activity to improve CV fitness, see care instructions for additional details     Weight Management and Nutrition: Dietary plan was discussed with patient at this visit including DASH, low sodium and/or as outlined in care instructions     OTHER:    # knee OA  - limited NSAIDs   - use tylenol and topical diclofenac gel   -= f/u with PCP for additional tx options     Instructed to follow-up with PCP for remainder of adult medical needs: yes  We will partner with other providers in the management of established vascular disease and cardiometabolic risk factors.    Studies to Be Obtained: echo 3/2023 - not ordered, aprn to track   Labs to Be Obtained: as noted above     Follow up in:  march    Aravind Chapman M.D.  [facility]   [facility]   [phone]

## 2022-07-21 ENCOUNTER — DOCUMENTATION (OUTPATIENT)
Dept: VASCULAR LAB | Facility: MEDICAL CENTER | Age: 72
End: 2022-07-21
Payer: MEDICARE

## 2022-07-21 DIAGNOSIS — E78.5 DYSLIPIDEMIA: ICD-10-CM

## 2022-07-21 RX ORDER — ATORVASTATIN CALCIUM 10 MG/1
10 TABLET, FILM COATED ORAL NIGHTLY
Qty: 30 TABLET | Refills: 5 | Status: SHIPPED | OUTPATIENT
Start: 2022-07-21 | End: 2022-10-05

## 2022-07-21 NOTE — PROGRESS NOTES
Pt just called. She recently started rosuvastatin per Dr. Chapman. She states she is experiencing discomfort in her left leg and groin. Pt describes is as a achy muscle pain that started yesterday. She stated she did not sleep well and has zero energy today.     She would like to know if she should stop taking the Rx.       Message routed to vascular med providers.

## 2022-07-21 NOTE — PROGRESS NOTES
Pt called stating since she started on the Rosuvastatin 10 mg dose she started to have muscle pain in her legs and groin and not fatigue. She will stop the Rosuvastatin for 2 weeks and then trial Atorvastatin 10 mg dose. She will delay her 6 week lab until 6 weeks after starting the atorvastatin.  JENNIFER Buckner.

## 2022-09-30 ENCOUNTER — HOSPITAL ENCOUNTER (OUTPATIENT)
Dept: LAB | Facility: MEDICAL CENTER | Age: 72
End: 2022-09-30
Attending: FAMILY MEDICINE
Payer: MEDICARE

## 2022-09-30 DIAGNOSIS — E78.5 DYSLIPIDEMIA: ICD-10-CM

## 2022-09-30 LAB
ALBUMIN SERPL BCP-MCNC: 4.1 G/DL (ref 3.2–4.9)
ALBUMIN/GLOB SERPL: 1.5 G/DL
ALP SERPL-CCNC: 96 U/L (ref 30–99)
ALT SERPL-CCNC: 27 U/L (ref 2–50)
ANION GAP SERPL CALC-SCNC: 11 MMOL/L (ref 7–16)
AST SERPL-CCNC: 17 U/L (ref 12–45)
BILIRUB SERPL-MCNC: 0.4 MG/DL (ref 0.1–1.5)
BUN SERPL-MCNC: 16 MG/DL (ref 8–22)
CALCIUM SERPL-MCNC: 9.3 MG/DL (ref 8.5–10.5)
CHLORIDE SERPL-SCNC: 109 MMOL/L (ref 96–112)
CHOLEST SERPL-MCNC: 186 MG/DL (ref 100–199)
CO2 SERPL-SCNC: 21 MMOL/L (ref 20–33)
CREAT SERPL-MCNC: 0.64 MG/DL (ref 0.5–1.4)
FASTING STATUS PATIENT QL REPORTED: NORMAL
GFR SERPLBLD CREATININE-BSD FMLA CKD-EPI: 93 ML/MIN/1.73 M 2
GLOBULIN SER CALC-MCNC: 2.7 G/DL (ref 1.9–3.5)
GLUCOSE SERPL-MCNC: 100 MG/DL (ref 65–99)
HDLC SERPL-MCNC: 72 MG/DL
LDLC SERPL CALC-MCNC: 100 MG/DL
POTASSIUM SERPL-SCNC: 4.5 MMOL/L (ref 3.6–5.5)
PROT SERPL-MCNC: 6.8 G/DL (ref 6–8.2)
SODIUM SERPL-SCNC: 141 MMOL/L (ref 135–145)
TRIGL SERPL-MCNC: 69 MG/DL (ref 0–149)

## 2022-09-30 PROCEDURE — 80053 COMPREHEN METABOLIC PANEL: CPT

## 2022-09-30 PROCEDURE — 36415 COLL VENOUS BLD VENIPUNCTURE: CPT

## 2022-09-30 PROCEDURE — 80061 LIPID PANEL: CPT

## 2022-10-05 ENCOUNTER — PATIENT MESSAGE (OUTPATIENT)
Dept: VASCULAR LAB | Facility: MEDICAL CENTER | Age: 72
End: 2022-10-05
Payer: MEDICARE

## 2022-10-05 DIAGNOSIS — E78.5 DYSLIPIDEMIA: ICD-10-CM

## 2022-10-05 DIAGNOSIS — R73.03 PREDIABETES: ICD-10-CM

## 2022-10-05 DIAGNOSIS — I71.21 ANEURYSM OF ASCENDING AORTA WITHOUT RUPTURE (HCC): ICD-10-CM

## 2022-10-05 RX ORDER — ATORVASTATIN CALCIUM 20 MG/1
20 TABLET, FILM COATED ORAL NIGHTLY
Qty: 30 TABLET | Refills: 5 | Status: SHIPPED | OUTPATIENT
Start: 2022-10-05 | End: 2023-03-21 | Stop reason: SDUPTHER

## 2022-10-20 ENCOUNTER — DOCUMENTATION (OUTPATIENT)
Dept: VASCULAR LAB | Facility: MEDICAL CENTER | Age: 72
End: 2022-10-20
Payer: MEDICARE

## 2022-10-20 DIAGNOSIS — I71.010 DISSECTION OF ASCENDING AORTA (HCC): ICD-10-CM

## 2022-10-20 DIAGNOSIS — I71.21 ANEURYSM OF ASCENDING AORTA WITHOUT RUPTURE (HCC): ICD-10-CM

## 2022-11-21 ENCOUNTER — HOSPITAL ENCOUNTER (OUTPATIENT)
Dept: LAB | Facility: MEDICAL CENTER | Age: 72
End: 2022-11-21
Attending: NURSE PRACTITIONER
Payer: MEDICARE

## 2022-11-21 DIAGNOSIS — E78.5 DYSLIPIDEMIA: ICD-10-CM

## 2022-11-21 DIAGNOSIS — R73.03 PREDIABETES: ICD-10-CM

## 2022-11-21 LAB
ALBUMIN SERPL BCP-MCNC: 4.2 G/DL (ref 3.2–4.9)
ALBUMIN/GLOB SERPL: 1.6 G/DL
ALP SERPL-CCNC: 104 U/L (ref 30–99)
ALT SERPL-CCNC: 42 U/L (ref 2–50)
ANION GAP SERPL CALC-SCNC: 10 MMOL/L (ref 7–16)
AST SERPL-CCNC: 25 U/L (ref 12–45)
BILIRUB SERPL-MCNC: 0.3 MG/DL (ref 0.1–1.5)
BUN SERPL-MCNC: 16 MG/DL (ref 8–22)
CALCIUM SERPL-MCNC: 9.3 MG/DL (ref 8.5–10.5)
CHLORIDE SERPL-SCNC: 105 MMOL/L (ref 96–112)
CHOLEST SERPL-MCNC: 159 MG/DL (ref 100–199)
CO2 SERPL-SCNC: 24 MMOL/L (ref 20–33)
CREAT SERPL-MCNC: 0.62 MG/DL (ref 0.5–1.4)
FASTING STATUS PATIENT QL REPORTED: NORMAL
GFR SERPLBLD CREATININE-BSD FMLA CKD-EPI: 94 ML/MIN/1.73 M 2
GLOBULIN SER CALC-MCNC: 2.6 G/DL (ref 1.9–3.5)
GLUCOSE SERPL-MCNC: 95 MG/DL (ref 65–99)
HDLC SERPL-MCNC: 67 MG/DL
LDLC SERPL CALC-MCNC: 79 MG/DL
POTASSIUM SERPL-SCNC: 4.2 MMOL/L (ref 3.6–5.5)
PROT SERPL-MCNC: 6.8 G/DL (ref 6–8.2)
SODIUM SERPL-SCNC: 139 MMOL/L (ref 135–145)
TRIGL SERPL-MCNC: 66 MG/DL (ref 0–149)

## 2022-11-21 PROCEDURE — 80053 COMPREHEN METABOLIC PANEL: CPT

## 2022-11-21 PROCEDURE — 80061 LIPID PANEL: CPT

## 2022-11-21 PROCEDURE — 36415 COLL VENOUS BLD VENIPUNCTURE: CPT

## 2023-01-10 ENCOUNTER — DOCUMENTATION (OUTPATIENT)
Dept: VASCULAR LAB | Facility: MEDICAL CENTER | Age: 73
End: 2023-01-10
Payer: MEDICARE

## 2023-01-10 DIAGNOSIS — I71.010 DISSECTION OF ASCENDING AORTA (HCC): ICD-10-CM

## 2023-01-10 DIAGNOSIS — I71.21 ANEURYSM OF ASCENDING AORTA WITHOUT RUPTURE (HCC): ICD-10-CM

## 2023-01-23 ENCOUNTER — HOSPITAL ENCOUNTER (OUTPATIENT)
Dept: RADIOLOGY | Facility: MEDICAL CENTER | Age: 73
End: 2023-01-23
Payer: MEDICARE

## 2023-01-23 DIAGNOSIS — I71.21 ANEURYSM OF ASCENDING AORTA WITHOUT RUPTURE (HCC): ICD-10-CM

## 2023-01-23 DIAGNOSIS — I71.010 DISSECTION OF ASCENDING AORTA (HCC): ICD-10-CM

## 2023-01-23 PROCEDURE — A9579 GAD-BASE MR CONTRAST NOS,1ML: HCPCS

## 2023-01-23 PROCEDURE — C8911 MRA W/O FOL W/CONT, CHEST: HCPCS

## 2023-01-23 PROCEDURE — 700117 HCHG RX CONTRAST REV CODE 255

## 2023-01-23 RX ADMIN — GADOTERIDOL 20 ML: 279.3 INJECTION, SOLUTION INTRAVENOUS at 10:46

## 2023-01-27 ENCOUNTER — DOCUMENTATION (OUTPATIENT)
Dept: VASCULAR LAB | Facility: MEDICAL CENTER | Age: 73
End: 2023-01-27
Payer: MEDICARE

## 2023-01-30 ENCOUNTER — TELEPHONE (OUTPATIENT)
Dept: VASCULAR LAB | Facility: MEDICAL CENTER | Age: 73
End: 2023-01-30
Payer: MEDICARE

## 2023-01-31 NOTE — TELEPHONE ENCOUNTER
"Called pt in regards to imaging results. \" MRA shows that aneurysm is stable. We will go over details at follow up visit\"    Verbal understanding provided   "

## 2023-03-16 DIAGNOSIS — I71.21 ASCENDING AORTIC ANEURYSM, UNSPECIFIED WHETHER RUPTURED (HCC): ICD-10-CM

## 2023-03-16 DIAGNOSIS — R73.03 PREDIABETES: ICD-10-CM

## 2023-03-16 DIAGNOSIS — E78.5 DYSLIPIDEMIA: ICD-10-CM

## 2023-03-17 ENCOUNTER — HOSPITAL ENCOUNTER (OUTPATIENT)
Dept: LAB | Facility: MEDICAL CENTER | Age: 73
End: 2023-03-17
Attending: NURSE PRACTITIONER
Payer: MEDICARE

## 2023-03-17 DIAGNOSIS — R73.03 PREDIABETES: ICD-10-CM

## 2023-03-17 DIAGNOSIS — E78.5 DYSLIPIDEMIA: ICD-10-CM

## 2023-03-17 LAB
ALBUMIN SERPL BCP-MCNC: 3.9 G/DL (ref 3.2–4.9)
ALBUMIN/GLOB SERPL: 1.6 G/DL
ALP SERPL-CCNC: 90 U/L (ref 30–99)
ALT SERPL-CCNC: 32 U/L (ref 2–50)
ANION GAP SERPL CALC-SCNC: 10 MMOL/L (ref 7–16)
AST SERPL-CCNC: 23 U/L (ref 12–45)
BILIRUB SERPL-MCNC: 0.7 MG/DL (ref 0.1–1.5)
BUN SERPL-MCNC: 17 MG/DL (ref 8–22)
CALCIUM ALBUM COR SERPL-MCNC: 9 MG/DL (ref 8.5–10.5)
CALCIUM SERPL-MCNC: 8.9 MG/DL (ref 8.5–10.5)
CHLORIDE SERPL-SCNC: 107 MMOL/L (ref 96–112)
CHOLEST SERPL-MCNC: 148 MG/DL (ref 100–199)
CO2 SERPL-SCNC: 21 MMOL/L (ref 20–33)
CREAT SERPL-MCNC: 0.59 MG/DL (ref 0.5–1.4)
EST. AVERAGE GLUCOSE BLD GHB EST-MCNC: 120 MG/DL
FASTING STATUS PATIENT QL REPORTED: NORMAL
GFR SERPLBLD CREATININE-BSD FMLA CKD-EPI: 95 ML/MIN/1.73 M 2
GLOBULIN SER CALC-MCNC: 2.5 G/DL (ref 1.9–3.5)
GLUCOSE SERPL-MCNC: 97 MG/DL (ref 65–99)
HBA1C MFR BLD: 5.8 % (ref 4–5.6)
HDLC SERPL-MCNC: 70 MG/DL
LDLC SERPL CALC-MCNC: 68 MG/DL
POTASSIUM SERPL-SCNC: 4.2 MMOL/L (ref 3.6–5.5)
PROT SERPL-MCNC: 6.4 G/DL (ref 6–8.2)
SODIUM SERPL-SCNC: 138 MMOL/L (ref 135–145)
TRIGL SERPL-MCNC: 48 MG/DL (ref 0–149)

## 2023-03-17 PROCEDURE — 80053 COMPREHEN METABOLIC PANEL: CPT

## 2023-03-17 PROCEDURE — 36415 COLL VENOUS BLD VENIPUNCTURE: CPT | Mod: GA

## 2023-03-17 PROCEDURE — 80061 LIPID PANEL: CPT

## 2023-03-17 PROCEDURE — 83036 HEMOGLOBIN GLYCOSYLATED A1C: CPT | Mod: GA

## 2023-03-21 ENCOUNTER — OFFICE VISIT (OUTPATIENT)
Dept: VASCULAR LAB | Facility: MEDICAL CENTER | Age: 73
End: 2023-03-21
Attending: NURSE PRACTITIONER
Payer: MEDICARE

## 2023-03-21 VITALS
SYSTOLIC BLOOD PRESSURE: 139 MMHG | BODY MASS INDEX: 31.65 KG/M2 | HEIGHT: 62 IN | HEART RATE: 50 BPM | WEIGHT: 172 LBS | DIASTOLIC BLOOD PRESSURE: 75 MMHG

## 2023-03-21 DIAGNOSIS — R73.03 PREDIABETES: ICD-10-CM

## 2023-03-21 DIAGNOSIS — E78.5 DYSLIPIDEMIA: ICD-10-CM

## 2023-03-21 DIAGNOSIS — I71.21 ANEURYSM OF ASCENDING AORTA WITHOUT RUPTURE (HCC): ICD-10-CM

## 2023-03-21 DIAGNOSIS — I27.20 PULMONARY HYPERTENSION (HCC): ICD-10-CM

## 2023-03-21 PROCEDURE — 99214 OFFICE O/P EST MOD 30 MIN: CPT | Performed by: NURSE PRACTITIONER

## 2023-03-21 PROCEDURE — 99212 OFFICE O/P EST SF 10 MIN: CPT

## 2023-03-21 RX ORDER — ATORVASTATIN CALCIUM 20 MG/1
20 TABLET, FILM COATED ORAL NIGHTLY
Qty: 90 TABLET | Refills: 3 | Status: SHIPPED | OUTPATIENT
Start: 2023-03-21

## 2023-03-21 ASSESSMENT — ENCOUNTER SYMPTOMS
BLOOD IN STOOL: 0
MYALGIAS: 0
FALLS: 0
ORTHOPNEA: 0
WEAKNESS: 0
SHORTNESS OF BREATH: 1
WHEEZING: 0
BRUISES/BLEEDS EASILY: 0
COUGH: 0

## 2023-03-21 NOTE — PROGRESS NOTES
Follow- up VASCULAR ANTICOAGULATION VISIT  Regina Cornell is a  female who presents 3/21/23 for   Chief Complaint   Patient presents with    Follow-Up     Initially referred by Sia Davis MD for length of therapy (LOT) determination and management of anticoagulation in context of acute venothromboembolic disease    Subjective     Thoracic Aortic Aneurysm  Type: ascending   Current/interval concerns: none  Current sx: denies chest, back, abdominal pain, SOB, dysphagia, worsening cough, hemoptysis.  Had MRA chest recently; stable    VTE disease / Anticoagulation:   Current symptoms: none   Current antithrombotic agent:  ASA 162mg daily   Complications: none, tolerating well, no bleeding or bruising   Date of initiation of anticoagulation: 11/2019, cessation 2/2021   Adherence: reports complete, no missed doses     Pertinent VTE pmhx:   Date of Diagnosis: cannot recall exact date 10/30/19  Type of Venous thromboembolic disease (VTE): acute LLE DVT  Type of imaging: duplex, CTPA - repeated 5/2020  Preceding/presenting symptoms: LLE swelling during post-op period after knee surgery - approximately 2 weeks postop had initial duplex, started on eliquis and, despite compliant therapy, then developed acute SOB, CP and found to have PE, switched to VKA as deemed eliquis failure   Antithrombotic therapy at time of VTE event: no  VTE tx course: initiated on Eliquis but then noted to have new onset SOB with CTPA showing possible new PE, transitioned to VKA since   Any personal VTE hx? No  Any family VTE hx? No   UNPROVOKED VS PROVOKED:   Recent surgery ? Yes, Details: 10/16/20 - L knee athroscopy with Dr. Booth  Smoker?  reports that she quit smoking about 46 years ago. Her smoking use included cigarettes. She has never used smokeless tobacco.    Other periods of immobility? Yes, Details: post-op     Hyperlipidemia:   Was rx'd rosuva at last visit; had muscle aches  Changed to atorva and tolerating at  "present          Current Outpatient Medications:     atorvastatin, 20 mg, Oral, Nightly, Taking    fluorouracil, APPLY TWICE DAILY TO SPOT ON FACE FOR TWO WEEKS., PRN    Diclofenac Sodium, 1 Application., Apply externally, 4X/DAY, PRN    B-12, Take  by mouth., Taking    aspirin EC, 162 mg, Oral, DAILY, Taking     Social History     Tobacco Use    Smoking status: Former     Packs/day: 0.00     Types: Cigarettes     Quit date:      Years since quittin.2    Smokeless tobacco: Never   Vaping Use    Vaping Use: Never used   Substance Use Topics    Alcohol use: Yes     Comment: occasional    Drug use: No     DIET AND EXERCISE:  Weight Change: up 4lbs  BMI Readings from Last 5 Encounters:   23 31.46 kg/m²   22 30.87 kg/m²   22 31.61 kg/m²   22 31.46 kg/m²   21 31.46 kg/m²     Diet: common adult  Exercise: minimal exercise     Review of Systems   HENT:  Negative for nosebleeds.    Respiratory:  Positive for shortness of breath (chronic). Negative for cough and wheezing.    Cardiovascular:  Negative for chest pain, orthopnea and leg swelling.   Gastrointestinal:  Negative for blood in stool.   Genitourinary:  Negative for hematuria.   Musculoskeletal:  Negative for falls and myalgias.   Neurological:  Negative for weakness.   Endo/Heme/Allergies:  Does not bruise/bleed easily.      Objective     Vitals:    23 1007 23 1010   BP: (!) 147/72 139/75   BP Location: Left arm Left arm   Patient Position: Sitting Sitting   BP Cuff Size: Adult Adult   Pulse: (!) 51 (!) 50   Weight: 78 kg (172 lb)    Height: 1.575 m (5' 2\")       BP Readings from Last 5 Encounters:   23 139/75   22 (!) 156/78   22 116/64   22 124/68   21 137/69      Body mass index is 31.46 kg/m².     Physical Exam  Constitutional:       General: She is not in acute distress.  Cardiovascular:      Rate and Rhythm: Regular rhythm. Bradycardia present.      Heart sounds: Normal heart " sounds.   Pulmonary:      Effort: Pulmonary effort is normal. No respiratory distress.      Breath sounds: Normal breath sounds. No wheezing.   Musculoskeletal:         General: No swelling. Normal range of motion.   Skin:     General: Skin is warm and dry.   Neurological:      Mental Status: She is alert and oriented to person, place, and time.      Motor: No weakness.      Gait: Gait normal.   Psychiatric:         Mood and Affect: Mood normal.         Behavior: Behavior normal.         Thought Content: Thought content normal.     Lab Results   Component Value Date/Time    CHOLSTRLTOT 148 03/17/2023 08:38 AM    LDL 68 03/17/2023 08:38 AM    HDL 70 03/17/2023 08:38 AM    TRIGLYCERIDE 48 03/17/2023 08:38 AM       Lab Results   Component Value Date/Time    SODIUM 138 03/17/2023 08:38 AM    POTASSIUM 4.2 03/17/2023 08:38 AM    CHLORIDE 107 03/17/2023 08:38 AM    CO2 21 03/17/2023 08:38 AM    GLUCOSE 97 03/17/2023 08:38 AM    BUN 17 03/17/2023 08:38 AM    CREATININE 0.59 03/17/2023 08:38 AM    CREATININE 0.9 10/05/2007 12:50 PM     Lab Results   Component Value Date/Time    ALKPHOSPHAT 90 03/17/2023 08:38 AM    ASTSGOT 23 03/17/2023 08:38 AM    ALTSGPT 32 03/17/2023 08:38 AM    TBILIRUBIN 0.7 03/17/2023 08:38 AM       Lab Results   Component Value Date    CHOLSTRLTOT 148 03/17/2023    LDL 68 03/17/2023    HDL 70 03/17/2023    TRIGLYCERIDE 48 03/17/2023             Lab Results   Component Value Date    HBA1C 5.8 (H) 03/17/2023      Lab Results   Component Value Date    SODIUM 138 03/17/2023    POTASSIUM 4.2 03/17/2023    CHLORIDE 107 03/17/2023    CO2 21 03/17/2023    GLUCOSE 97 03/17/2023    BUN 17 03/17/2023    CREATININE 0.59 03/17/2023              VASCULAR IMAGING:     Last EKG:   Results for orders placed or performed during the hospital encounter of 11/05/19   EKG   Result Value Ref Range    Report       [facility] Emergency Dept.    Test Date:  2019-11-05  Pt Name:    MICHELET BARNES             Department: [facility]  MRN:        [id number]                      Room:  Gender:     Female                       Technician: [id number]  :        [id number]                   Requested By:ER TRIAGE PROTOCOL  Order #:    [id number]                    Reading MD:    Measurements  Intervals                                Axis  Rate:       54                           P:          41  CO:         160                          QRS:        -4  QRSD:       87                           T:          10  QT:         437  QTc:        415    Interpretive Statements  Sinus rhythm  Probable left atrial enlargement  Probable left ventricular hypertrophy  Borderline T abnormalities, anterior leads  Compared to ECG 10/03/2019 14:34:29  T-wave abnormality now present       Echo 2017  No prior study is available for comparison.   Normal left ventricular systolic function.  Normal left ventricular wall thickness.  Mild aortic stenosis.  Mild tricuspid regurgitation.  Estimated right ventricular systolic pressure is 45 mmHg.  No prior study is available for comparison.   Aorta  Ascending aorta is dilated with a diameter of 4.1 cm.    LLE venous 2019    Venous duplex imaging was performed in only the left lower extremity    including serial compressions and spectral Doppler flow evaluation.   Partially compressible left femoral vein mid & distal with subacute    appearing softly echogenic material (thrombus) in the lumen. Incompressible    left popliteal and gastrocnemius veins with subacute appearing mixed    echogenic material (thrombus) in the lumen.    Partially compressible left peroneal vein with subacute appearing thrombus    in the lumen.    Echo 19  Prior Echo - 17.  Normal left ventricular systolic function.   No evidence of valvular abnormality based on Doppler evaluation.   Compared to the images of the prior study done -  there has been no   significant change.   Ascending aorta is dilated with a diameter of 4.0  cm.    CTPA 11/5/19  1.  Isolated pulmonary embolus is identified in superior segment right lower pulmonary lobe.   2.  RV LV ratio is elevated.   3.  There are 2 right lung pulmonary nodules. Largest measures 9 mm in diameter. Recommend follow-up CT in 3-6 months.    LLE venous 5/22/20    Chronic, partial thrombus with flow recanalization in the LEFT popliteal    vein. Chronic, occlusive thrombus in the LEFT peroneal trunk vein.     Compared to the images of the duplex dated 11/6/19 - there has been an    evolution of the DVT.    CXR 2/2021   No acute cardiopulmonary findings.    VQ scan 2/2021    Low probability of pulmonary embolism.    Echo 2/2021   Compared to the images of the prior study done 11/6/2019 - the estimate   of right ventricular systolic pressure is increased, previously 25   mmHg.  Normal left ventricular systolic function.  Left ventricular ejection fraction is visually estimated to be 65%.  Normal right ventricular size and systolic function.  Mild mitral regurgitation.  Mild aortic insufficiency.  Mild tricuspid regurgitation.  Estimated right ventricular systolic pressure is 40 mmHg.  Ascending aorta is borderline dilated with a diameter of 4.0 cm.    Echo 3/2022   Aorta  Normal aortic root for body surface area. The ascending aorta is   dilated with a diameter of 4-4.2 cm.  The arch is normal size.  The left ventricular ejection fraction is visually estimated to be 65-  70%.  Aortic valve sclerosis without significant stenosis.  Estimated right ventricular systolic pressure is 32-35 mmHg.  The ascending aorta is dilated with a diameter of 4-4.2 cm, difficult   to see borders in some views, difficult to see borders in some views.   Compared to the prior echo on 02/10/21, probably no significant change.    MRA Chest 1/23/23     1.  MRA of the thoracic aorta demonstrates fusiform aneurysm of the ascending thoracic aorta currently measuring 4.1 x 4.0 cm.     2.  No evidence of aortic  "dissection.     3.  No other aneurysm identified.     4.  Branch vessels of the aorta appear patent.       Medical Decision Making:  Today's Assessment / Status / Plan:     1. Prediabetes        2. Dyslipidemia        3. Pulmonary hypertension (HCC)          PATIENT TYPE: Primary Prevention    Etiology of Established CVD if Present:     1) Ascending aortic aneurysm:   4.1x4.0cm on recent MRA  Presumed cystic medial degeneration with sporadic development.   Echo shows no biscupid Ao valve  No fhx of pmhx of connective tissue disease  - reviewed anatomy, risks, emergency s/s requiring immediate attention and gave handouts.   -  Echo in 18mo, per mb  - focus on ARB and BB for BP control if HTN developed   - activity restrictions including no extreme endurance activities, smoking, stimulants, and extreme weight lifting >20lbs     2) VTE disease - stable    -acute LLE DVT, 11/2019  - acute PE, 11/2019  - see OAC plan, no further imaging     ANTITHROMBOTIC THERAPY:  Anti-Platelet/Anti-Coagulant Tx recommended: yes  Indication: hx of surg-associated DVT/PE - 11/2019   Date of initiation: 11/2019   HAS-BLED bleeding risk calc ([email or web address]): 1 pt, 3.4%, low risk  Thrombophilia/hypercoag evaluation:  not recommended  Factors to consider for indefinite OAC: None   Last CBC, BMP: 2019  Expected duration: completed VKA 2/2021   CXR, echo, VQ, DDimer negative 2/2021   Antithrombotic therapy plan:  - continue ASA 162mg indefinitely   --continue knee-high compression socks, 20-30mmHg, as much as tolerated    - increase walking, avoid prolonged standing   - elevated legs while sitting and sleeping above heart level  - reduce sodium (\"salt\") in diet to less than 2,000mg daily   - counseled on signs and symptoms of acute VTE that require seeking prompt attention to include shortness of breath, chest pain, pain with deep inhalation, acute leg swelling and/or pain in calf or leg   - elevate legs as much as possible, use compression " "stockings/socks if directed by your provider  - Avoid hormonal therapies including estrogen or testosterone-containing meds, or raloxifene or tamoxifene (commonly used for osteoporosis)  - Avoid sedentary periods  - continue complete avoidance of tobacco products  - if having any invasive procedure,please make sure the doctor knows of your history of blood clots and current anticoagulation status    LIPID MANAGEMENT:   Qualifies for Statin Therapy Based on 2018 ACC/AHA Guidelines: yes, Primary Prevention - 40-76yo, LDLc >70, <190 w/o DM  The 10-year ASCVD risk score (Miya STEWART, et al., 2019) is: 14.2%  7.5 - <20% \"intermediate risk\" .   Major ASCVD events: None   High-risk conditions: N/A  Risk-enhancers: None  Currently on Statin:  yes  - worsening LDL-C over time from 127 to 158   Statin therapy can reduce risk of recurrent VTE if off OAC  Had leg/groin pains on rosuvastatin; tolerates atorva at present   Treatment goals: LDL-C <100 (consider non-HDL-C <130, apoB <90)   At goal? Yes 3/2023   Plan:   - reinforced ongoing TLC measures as noted   - continue atorvastatin 20mg daily  - follow cmp, lipids in 6mo    BLOOD PRESSURE MANAGEMENT:  ACC/AHA (2017) goal <130/80  Home BP at goal: yes, 120/60s, per pt report- using upper-arm cuff  Office BP at goal:  No, mild high SBP   Indications of end organ damage: dilated Ascending Ao, LVH per EKG   Indicated medications for reduction of expansion rate of TAA:   -Beta-blocker (decreases pulsatile wall stress)   -Losartan (inhibits tissue growth factor beta, decreasing aneurysmal growth rate or rate of expansion)  Plan:   Monitoring:   - continue home BP monitoring; call if consistently >135/85- discussed and demonstrated correct technique at today's visit. Gave home log; instructed to bring to f/u appt  Medications:  Consider losartan as 1st line       GLYCEMIC STATUS:  Prediabetic  A1c improved on most recent labs; down to 5.8 (was 6.0)  -continue to focus on TLC; limit " simple carbs/sugars, increase fiber, increase exercise    LIFESTYLE RECOMMENDATIONS:     Smoking:  reports that she quit smoking about 46 years ago. Her smoking use included cigarettes. She has never used smokeless tobacco.   - continued complete avoidance of all tobacco products     Physical Activity: encourage healthy activity to improve CV fitness; avoid heavy lifting    Other:  Bradycardia- No HR lowering meds.  denies lightheaded/dizziness, fatigue.  No symptoms with exercise or posture change.  Continue to monitor      Instructed to follow-up with PCP for remainder of adult medical needs: yes  We will partner with other providers in the management of established vascular disease and cardiometabolic risk factors.    Studies to Be Obtained: echo 7/2024  Labs to Be Obtained: cmp, lipids, a1c    Follow up in:  6mo    QUINTON Ruby  [facility]   [facility]   [phone]

## 2023-10-05 ENCOUNTER — HOSPITAL ENCOUNTER (OUTPATIENT)
Dept: LAB | Facility: MEDICAL CENTER | Age: 73
End: 2023-10-05
Attending: NURSE PRACTITIONER
Payer: MEDICARE

## 2023-10-05 DIAGNOSIS — E78.5 DYSLIPIDEMIA: ICD-10-CM

## 2023-10-05 DIAGNOSIS — R73.03 PREDIABETES: ICD-10-CM

## 2023-10-05 LAB
ALBUMIN SERPL BCP-MCNC: 4.4 G/DL (ref 3.2–4.9)
ALBUMIN/GLOB SERPL: 1.8 G/DL
ALP SERPL-CCNC: 88 U/L (ref 30–99)
ALT SERPL-CCNC: 34 U/L (ref 2–50)
ANION GAP SERPL CALC-SCNC: 11 MMOL/L (ref 7–16)
AST SERPL-CCNC: 25 U/L (ref 12–45)
BILIRUB SERPL-MCNC: 0.7 MG/DL (ref 0.1–1.5)
BUN SERPL-MCNC: 15 MG/DL (ref 8–22)
CALCIUM ALBUM COR SERPL-MCNC: 9 MG/DL (ref 8.5–10.5)
CALCIUM SERPL-MCNC: 9.3 MG/DL (ref 8.5–10.5)
CHLORIDE SERPL-SCNC: 108 MMOL/L (ref 96–112)
CHOLEST SERPL-MCNC: 175 MG/DL (ref 100–199)
CO2 SERPL-SCNC: 23 MMOL/L (ref 20–33)
CREAT SERPL-MCNC: 0.57 MG/DL (ref 0.5–1.4)
CREAT UR-MCNC: 259.14 MG/DL
EST. AVERAGE GLUCOSE BLD GHB EST-MCNC: 111 MG/DL
FASTING STATUS PATIENT QL REPORTED: NORMAL
GFR SERPLBLD CREATININE-BSD FMLA CKD-EPI: 95 ML/MIN/1.73 M 2
GLOBULIN SER CALC-MCNC: 2.5 G/DL (ref 1.9–3.5)
GLUCOSE SERPL-MCNC: 107 MG/DL (ref 65–99)
HBA1C MFR BLD: 5.5 % (ref 4–5.6)
HDLC SERPL-MCNC: 80 MG/DL
LDLC SERPL CALC-MCNC: 85 MG/DL
MICROALBUMIN UR-MCNC: <1.2 MG/DL
MICROALBUMIN/CREAT UR: NORMAL MG/G (ref 0–30)
POTASSIUM SERPL-SCNC: 4.1 MMOL/L (ref 3.6–5.5)
PROT SERPL-MCNC: 6.9 G/DL (ref 6–8.2)
SODIUM SERPL-SCNC: 142 MMOL/L (ref 135–145)
TRIGL SERPL-MCNC: 49 MG/DL (ref 0–149)

## 2023-10-05 PROCEDURE — 83036 HEMOGLOBIN GLYCOSYLATED A1C: CPT | Mod: GA

## 2023-10-05 PROCEDURE — 82043 UR ALBUMIN QUANTITATIVE: CPT

## 2023-10-05 PROCEDURE — 80061 LIPID PANEL: CPT

## 2023-10-05 PROCEDURE — 82570 ASSAY OF URINE CREATININE: CPT

## 2023-10-05 PROCEDURE — 80053 COMPREHEN METABOLIC PANEL: CPT

## 2023-10-05 PROCEDURE — 36415 COLL VENOUS BLD VENIPUNCTURE: CPT | Mod: GA

## 2023-10-13 ENCOUNTER — OFFICE VISIT (OUTPATIENT)
Dept: VASCULAR LAB | Facility: MEDICAL CENTER | Age: 73
End: 2023-10-13
Payer: MEDICARE

## 2023-10-13 VITALS
SYSTOLIC BLOOD PRESSURE: 146 MMHG | BODY MASS INDEX: 32.02 KG/M2 | HEART RATE: 52 BPM | DIASTOLIC BLOOD PRESSURE: 78 MMHG | WEIGHT: 174 LBS | HEIGHT: 62 IN

## 2023-10-13 DIAGNOSIS — E78.5 DYSLIPIDEMIA: ICD-10-CM

## 2023-10-13 DIAGNOSIS — R73.03 PREDIABETES: ICD-10-CM

## 2023-10-13 DIAGNOSIS — Z79.02 ANTIPLATELET OR ANTITHROMBOTIC LONG-TERM USE: ICD-10-CM

## 2023-10-13 DIAGNOSIS — I27.20 PULMONARY HYPERTENSION (HCC): ICD-10-CM

## 2023-10-13 DIAGNOSIS — Z86.711 HISTORY OF PULMONARY EMBOLUS (PE): ICD-10-CM

## 2023-10-13 DIAGNOSIS — I71.21 ASCENDING AORTIC ANEURYSM, UNSPECIFIED WHETHER RUPTURED (HCC): ICD-10-CM

## 2023-10-13 PROCEDURE — 99214 OFFICE O/P EST MOD 30 MIN: CPT

## 2023-10-13 PROCEDURE — 3077F SYST BP >= 140 MM HG: CPT

## 2023-10-13 PROCEDURE — 3078F DIAST BP <80 MM HG: CPT

## 2023-10-13 PROCEDURE — 99212 OFFICE O/P EST SF 10 MIN: CPT

## 2023-10-13 ASSESSMENT — ENCOUNTER SYMPTOMS
FOCAL WEAKNESS: 0
HEADACHES: 0
MYALGIAS: 0
SHORTNESS OF BREATH: 1
FALLS: 0
WHEEZING: 0
BRUISES/BLEEDS EASILY: 0
WEAKNESS: 0
PALPITATIONS: 0
BLOOD IN STOOL: 0
BLURRED VISION: 0
COUGH: 0
SPEECH CHANGE: 0
ORTHOPNEA: 0
DIZZINESS: 0
DOUBLE VISION: 0

## 2023-10-13 NOTE — PROGRESS NOTES
Follow- up VASCULAR ANTICOAGULATION VISIT  Regina Cornell is a 73 yo female who presents 10/13/2023 for   Chief Complaint   Patient presents with    Follow-Up     Initially referred by Sia Davis MD for length of therapy (LOT) determination and management of anticoagulation in context of acute venothromboembolic disease    Subjective   Doing well overall  Still having chronic SOB, not worse, but feels exercise tolerance is less than prior to PE  Home BPs 120-126/70s, all under 130  Had fasting labs    Thoracic Aortic Aneurysm  Type: ascending   Current/interval concerns: none  Current sx: denies chest, back, abdominal pain, dysphagia, worsening cough, hemoptysis.      VTE disease / Anticoagulation:   Current symptoms: none   Current antithrombotic agent:  ASA 162mg daily   Complications: none, tolerating well, no bleeding or bruising   Date of initiation of anticoagulation: 11/2019, cessation 2/2021   Adherence: reports complete, no missed doses     Pertinent VTE pmhx:   Date of Diagnosis: cannot recall exact date 10/30/19  Type of Venous thromboembolic disease (VTE): acute LLE DVT  Type of imaging: duplex, CTPA - repeated 5/2020  Preceding/presenting symptoms: LLE swelling during post-op period after knee surgery - approximately 2 weeks postop had initial duplex, started on eliquis and, despite compliant therapy, then developed acute SOB, CP and found to have PE, switched to VKA as deemed eliquis failure   Antithrombotic therapy at time of VTE event: no  VTE tx course: initiated on Eliquis but then noted to have new onset SOB with CTPA showing possible new PE, transitioned to VKA since   Any personal VTE hx? No  Any family VTE hx? No   UNPROVOKED VS PROVOKED:   Recent surgery ? Yes, Details: 10/16/20 - L knee athroscopy with Dr. Booth  Smoker?  reports that she quit smoking about 46 years ago. Her smoking use included cigarettes. She has never used smokeless tobacco.    Other periods of immobility? Yes,  "Details: post-op     Hyperlipidemia:   Tolerating atorva without myalgias            Current Outpatient Medications:     atorvastatin, 20 mg, Oral, Nightly, Taking    fluorouracil, APPLY TWICE DAILY TO SPOT ON FACE FOR TWO WEEKS., Taking    Diclofenac Sodium, 1 Application , Apply externally, 4X/DAY, Taking    aspirin EC, 162 mg, Oral, DAILY, Taking     Social History     Tobacco Use    Smoking status: Former     Current packs/day: 0.00     Types: Cigarettes     Quit date:      Years since quittin.8    Smokeless tobacco: Never   Vaping Use    Vaping Use: Never used   Substance Use Topics    Alcohol use: Yes     Comment: occasional    Drug use: No     DIET AND EXERCISE:  Weight Change: up another 2lbs  BMI Readings from Last 5 Encounters:   10/13/23 31.83 kg/m²   23 31.46 kg/m²   22 30.87 kg/m²   22 31.61 kg/m²   22 31.46 kg/m²     Diet: common adult  Exercise: minimal exercise     Review of Systems   Constitutional:  Negative for malaise/fatigue.   HENT:  Negative for nosebleeds.    Eyes:  Negative for blurred vision and double vision.   Respiratory:  Positive for shortness of breath (chronic). Negative for cough and wheezing.    Cardiovascular:  Negative for chest pain, palpitations, orthopnea and leg swelling.   Gastrointestinal:  Negative for blood in stool and melena.   Genitourinary:  Negative for hematuria.   Musculoskeletal:  Negative for falls and myalgias.   Neurological:  Negative for dizziness, speech change, focal weakness, weakness and headaches.   Endo/Heme/Allergies:  Does not bruise/bleed easily.        Objective     Vitals:    10/13/23 1046 10/13/23 1049   BP: (!) 151/81 (!) 146/78   BP Location: Left arm Left arm   Patient Position: Sitting Sitting   BP Cuff Size: Adult Adult   Pulse: (!) 52 (!) 52   Weight: 78.9 kg (174 lb)    Height: 1.575 m (5' 2\")       BP Readings from Last 5 Encounters:   10/13/23 (!) 146/78   23 139/75   22 (!) 156/78   22 " 116/64   01/12/22 124/68      Body mass index is 31.83 kg/m².     Physical Exam  Vitals reviewed.   Constitutional:       General: She is not in acute distress.     Appearance: Normal appearance.   HENT:      Head: Normocephalic and atraumatic.   Cardiovascular:      Rate and Rhythm: Regular rhythm. Bradycardia present.      Heart sounds: Normal heart sounds.   Pulmonary:      Effort: Pulmonary effort is normal. No respiratory distress.      Breath sounds: Normal breath sounds. No wheezing or rales.   Musculoskeletal:         General: No swelling. Normal range of motion.      Right lower leg: No edema.      Left lower leg: No edema.   Skin:     General: Skin is warm and dry.      Coloration: Skin is not pale.   Neurological:      General: No focal deficit present.      Mental Status: She is alert and oriented to person, place, and time.      Coordination: Coordination normal.      Gait: Gait normal.   Psychiatric:         Mood and Affect: Mood normal.         Behavior: Behavior normal.         Thought Content: Thought content normal.       Lab Results   Component Value Date/Time    CHOLSTRLTOT 175 10/05/2023 08:39 AM    LDL 85 10/05/2023 08:39 AM    HDL 80 10/05/2023 08:39 AM    TRIGLYCERIDE 49 10/05/2023 08:39 AM       Lab Results   Component Value Date/Time    SODIUM 142 10/05/2023 08:39 AM    POTASSIUM 4.1 10/05/2023 08:39 AM    CHLORIDE 108 10/05/2023 08:39 AM    CO2 23 10/05/2023 08:39 AM    GLUCOSE 107 (H) 10/05/2023 08:39 AM    BUN 15 10/05/2023 08:39 AM    CREATININE 0.57 10/05/2023 08:39 AM    CREATININE 0.9 10/05/2007 12:50 PM     Lab Results   Component Value Date/Time    ALKPHOSPHAT 88 10/05/2023 08:39 AM    ASTSGOT 25 10/05/2023 08:39 AM    ALTSGPT 34 10/05/2023 08:39 AM    TBILIRUBIN 0.7 10/05/2023 08:39 AM       Lab Results   Component Value Date    CHOLSTRLTOT 175 10/05/2023    LDL 85 10/05/2023    HDL 80 10/05/2023    TRIGLYCERIDE 49 10/05/2023             Lab Results   Component Value Date    HBA1C  5.5 10/05/2023      Lab Results   Component Value Date    SODIUM 142 10/05/2023    POTASSIUM 4.1 10/05/2023    CHLORIDE 108 10/05/2023    CO2 23 10/05/2023    GLUCOSE 107 (H) 10/05/2023    BUN 15 10/05/2023    CREATININE 0.57 10/05/2023              VASCULAR IMAGING:     Last EKG:   Results for orders placed or performed during the hospital encounter of 19   EKG   Result Value Ref Range    Report       [facility]    Test Date:  2019  Pt Name:    MICHELET BARNES            Department: [facility]  MRN:        [id number]                      Room:  Gender:     Female                       Technician: [id number]  :        [id number]                   Requested By:ER TRIAGE PROTOCOL  Order #:    [id number]                    Reading MD:    Measurements  Intervals                                Axis  Rate:       54                           P:          41  CO:         160                          QRS:        -4  QRSD:       87                           T:          10  QT:         437  QTc:        415    Interpretive Statements  Sinus rhythm  Probable left atrial enlargement  Probable left ventricular hypertrophy  Borderline T abnormalities, anterior leads  Compared to ECG 10/03/2019 14:34:29  T-wave abnormality now present       Echo 2017  No prior study is available for comparison.   Normal left ventricular systolic function.  Normal left ventricular wall thickness.  Mild aortic stenosis.  Mild tricuspid regurgitation.  Estimated right ventricular systolic pressure is 45 mmHg.  No prior study is available for comparison.   Aorta  Ascending aorta is dilated with a diameter of 4.1 cm.    LLE venous 2019    Venous duplex imaging was performed in only the left lower extremity    including serial compressions and spectral Doppler flow evaluation.   Partially compressible left femoral vein mid & distal with subacute    appearing softly echogenic material (thrombus) in the lumen.  Incompressible    left popliteal and gastrocnemius veins with subacute appearing mixed    echogenic material (thrombus) in the lumen.    Partially compressible left peroneal vein with subacute appearing thrombus    in the lumen.    Echo 11/6/19  Prior Echo - 5/19/17.  Normal left ventricular systolic function.   No evidence of valvular abnormality based on Doppler evaluation.   Compared to the images of the prior study done -  there has been no   significant change.   Ascending aorta is dilated with a diameter of 4.0 cm.    CTPA 11/5/19  1.  Isolated pulmonary embolus is identified in superior segment right lower pulmonary lobe.   2.  RV LV ratio is elevated.   3.  There are 2 right lung pulmonary nodules. Largest measures 9 mm in diameter. Recommend follow-up CT in 3-6 months.    LLE venous 5/22/20    Chronic, partial thrombus with flow recanalization in the LEFT popliteal    vein. Chronic, occlusive thrombus in the LEFT peroneal trunk vein.     Compared to the images of the duplex dated 11/6/19 - there has been an    evolution of the DVT.    CXR 2/2021   No acute cardiopulmonary findings.    VQ scan 2/2021    Low probability of pulmonary embolism.    Echo 2/2021   Compared to the images of the prior study done 11/6/2019 - the estimate   of right ventricular systolic pressure is increased, previously 25   mmHg.  Normal left ventricular systolic function.  Left ventricular ejection fraction is visually estimated to be 65%.  Normal right ventricular size and systolic function.  Mild mitral regurgitation.  Mild aortic insufficiency.  Mild tricuspid regurgitation.  Estimated right ventricular systolic pressure is 40 mmHg.  Ascending aorta is borderline dilated with a diameter of 4.0 cm.    Echo 3/2022   Aorta  Normal aortic root for body surface area. The ascending aorta is   dilated with a diameter of 4-4.2 cm.  The arch is normal size.  The left ventricular ejection fraction is visually estimated to be  65-  70%.  Aortic valve sclerosis without significant stenosis.  Estimated right ventricular systolic pressure is 32-35 mmHg.  The ascending aorta is dilated with a diameter of 4-4.2 cm, difficult   to see borders in some views, difficult to see borders in some views.   Compared to the prior echo on 02/10/21, probably no significant change.    MRA Chest 1/23/23     1.  MRA of the thoracic aorta demonstrates fusiform aneurysm of the ascending thoracic aorta currently measuring 4.1 x 4.0 cm.     2.  No evidence of aortic dissection.     3.  No other aneurysm identified.     4.  Branch vessels of the aorta appear patent.       Medical Decision Making:  Today's Assessment / Status / Plan:     1. Prediabetes  Comp Metabolic Panel      2. Dyslipidemia  Lipid Profile    Comp Metabolic Panel      3. History of pulmonary embolus (PE)  EC-ECHOCARDIOGRAM COMPLETE W/O CONT      4. Ascending aortic aneurysm, unspecified whether ruptured (HCC)  EC-ECHOCARDIOGRAM COMPLETE W/O CONT      5. Pulmonary hypertension (HCC)  EC-ECHOCARDIOGRAM COMPLETE W/O CONT      6. Antiplatelet or antithrombotic long-term use  CBC WITHOUT DIFFERENTIAL        PATIENT TYPE: Primary Prevention    Etiology of Established CVD if Present:     1) Ascending aortic aneurysm:   4.1x4.0cm on recent MRA  Presumed cystic medial degeneration with sporadic development.   Echo shows no biscupid Ao valve  No fhx of pmhx of connective tissue disease  - reviewed anatomy, risks, emergency s/s requiring immediate attention and gave handouts.   -  Echo in 18mo due 7/2024, per mb, pt wishing to do a few months early, ordered and scheduled for 5/2024 per pt request  - focus on ARB and BB for BP control if HTN developed   - activity restrictions including no extreme endurance activities, smoking, stimulants, and extreme weight lifting >20lbs     2) VTE disease - stable    -acute LLE DVT, 11/2019  - acute PE, 11/2019  - see OAC plan, no further imaging     ANTITHROMBOTIC  "THERAPY:  Anti-Platelet/Anti-Coagulant Tx recommended: yes  Indication: hx of surg-associated DVT/PE - 11/2019   Date of initiation: 11/2019   HAS-BLED bleeding risk calc ([email or web address]): 1 pt, 3.4%, low risk  Thrombophilia/hypercoag evaluation:  not recommended  Factors to consider for indefinite OAC: None   Last CBC, BMP: 2019  Expected duration: completed VKA 2/2021   CXR, echo, VQ, DDimer negative 2/2021   Antithrombotic therapy plan:  - continue ASA 162mg indefinitely   --continue knee-high compression socks, 20-30mmHg, as much as tolerated    - increase walking, avoid prolonged standing   - elevated legs while sitting and sleeping above heart level  - reduce sodium (\"salt\") in diet to less than 2,000mg daily   - counseled on signs and symptoms of acute VTE that require seeking prompt attention to include shortness of breath, chest pain, pain with deep inhalation, acute leg swelling and/or pain in calf or leg   - elevate legs as much as possible, use compression stockings/socks if directed by your provider  - Avoid hormonal therapies including estrogen or testosterone-containing meds, or raloxifene or tamoxifene (commonly used for osteoporosis)  - Avoid sedentary periods  - continue complete avoidance of tobacco products  - if having any invasive procedure,please make sure the doctor knows of your history of blood clots and current anticoagulation status    LIPID MANAGEMENT:   Qualifies for Statin Therapy Based on 2018 ACC/AHA Guidelines: yes, Primary Prevention - 40-76yo, LDLc >70, <190 w/o DM  The 10-year ASCVD risk score (Miya DK, et al., 2019) is: 15.8%  7.5 - <20% \"intermediate risk\" .   Major ASCVD events: None   High-risk conditions: N/A  Risk-enhancers: None  Currently on Statin:  yes  - worsening LDL-C over time from 127 to 158 previously,   Statin therapy can reduce risk of recurrent VTE if off OAC  Had leg/groin pains on rosuvastatin; tolerates atorva at present   Treatment goals: LDL-C <100 (consider " non-HDL-C <130, apoB <90)   At goal? Yes 10/2023, slight increase in LDL and non-HDL noted on recent labs - reviewed with pt  Plan:   - again reinforced ongoing TLC measures as noted   - continue atorvastatin 20mg daily, consider intensification if increase above goal   - follow cmp, lipids in 6mo    BLOOD PRESSURE MANAGEMENT:  ACC/AHA (2017) goal <130/80  Home BP at goal: yes, 120-126/70s, per pt report- using upper-arm cuff  Office BP at goal:  No, mild high SBP   Indications of end organ damage: dilated Ascending Ao, LVH per EKG   Indicated medications for reduction of expansion rate of TAA:   -Beta-blocker (decreases pulsatile wall stress)   -Losartan (inhibits tissue growth factor beta, decreasing aneurysmal growth rate or rate of expansion)  Plan:   Monitoring:   - continue home BP monitoring; call if consistently >135/85- discussed and demonstrated correct technique at today's visit. Gave home log; instructed to bring to f/u appt  Medications:  Consider losartan as 1st line       GLYCEMIC STATUS:  Prediabetic  A1c improved on most recent labs; down to 5.5 (was 6.0) at highest  -continue to focus on TLC; limit simple carbs/sugars, increase fiber, increase exercise    LIFESTYLE RECOMMENDATIONS:     Smoking:  reports that she quit smoking about 46 years ago. Her smoking use included cigarettes. She has never used smokeless tobacco.   - continued complete avoidance of all tobacco products     Physical Activity: encourage healthy activity to improve CV fitness; avoid heavy lifting    Other:  Bradycardia- No HR lowering meds.  denies lightheaded/dizziness, fatigue.  No symptoms with exercise or posture change.  Continue to monitor      Instructed to follow-up with PCP for remainder of adult medical needs: yes  We will partner with other providers in the management of established vascular disease and cardiometabolic risk factors.    Studies to Be Obtained: echo 7/2024 - ordered and scheduled 5/2024  Labs to Be  Obtained: cmp, lipids, a1c    Follow up in:  6-7 mo after imaging for review per pt request    JAVI Peralta.  [facility]   [facility]   [phone]

## 2024-05-02 ENCOUNTER — HOSPITAL ENCOUNTER (OUTPATIENT)
Dept: LAB | Facility: MEDICAL CENTER | Age: 74
End: 2024-05-02
Payer: MEDICARE

## 2024-05-02 DIAGNOSIS — E78.5 DYSLIPIDEMIA: ICD-10-CM

## 2024-05-02 DIAGNOSIS — Z79.02 ANTIPLATELET OR ANTITHROMBOTIC LONG-TERM USE: ICD-10-CM

## 2024-05-02 DIAGNOSIS — R73.03 PREDIABETES: ICD-10-CM

## 2024-05-02 LAB
ERYTHROCYTE [DISTWIDTH] IN BLOOD BY AUTOMATED COUNT: 49.5 FL (ref 35.9–50)
HCT VFR BLD AUTO: 45.4 % (ref 37–47)
HGB BLD-MCNC: 15 G/DL (ref 12–16)
MCH RBC QN AUTO: 30.5 PG (ref 27–33)
MCHC RBC AUTO-ENTMCNC: 33 G/DL (ref 32.2–35.5)
MCV RBC AUTO: 92.5 FL (ref 81.4–97.8)
PLATELET # BLD AUTO: 222 K/UL (ref 164–446)
PMV BLD AUTO: 10.5 FL (ref 9–12.9)
RBC # BLD AUTO: 4.91 M/UL (ref 4.2–5.4)
WBC # BLD AUTO: 7.2 K/UL (ref 4.8–10.8)

## 2024-05-03 LAB
ALBUMIN SERPL BCP-MCNC: 4.4 G/DL (ref 3.2–4.9)
ALBUMIN/GLOB SERPL: 1.7 G/DL
ALP SERPL-CCNC: 93 U/L (ref 30–99)
ALT SERPL-CCNC: 30 U/L (ref 2–50)
ANION GAP SERPL CALC-SCNC: 16 MMOL/L (ref 7–16)
AST SERPL-CCNC: 26 U/L (ref 12–45)
BILIRUB SERPL-MCNC: 0.7 MG/DL (ref 0.1–1.5)
BUN SERPL-MCNC: 15 MG/DL (ref 8–22)
CALCIUM ALBUM COR SERPL-MCNC: 9.1 MG/DL (ref 8.5–10.5)
CALCIUM SERPL-MCNC: 9.4 MG/DL (ref 8.5–10.5)
CHLORIDE SERPL-SCNC: 104 MMOL/L (ref 96–112)
CHOLEST SERPL-MCNC: 175 MG/DL (ref 100–199)
CO2 SERPL-SCNC: 20 MMOL/L (ref 20–33)
CREAT SERPL-MCNC: 0.53 MG/DL (ref 0.5–1.4)
FASTING STATUS PATIENT QL REPORTED: NORMAL
GFR SERPLBLD CREATININE-BSD FMLA CKD-EPI: 97 ML/MIN/1.73 M 2
GLOBULIN SER CALC-MCNC: 2.6 G/DL (ref 1.9–3.5)
GLUCOSE SERPL-MCNC: 98 MG/DL (ref 65–99)
HDLC SERPL-MCNC: 82 MG/DL
LDLC SERPL CALC-MCNC: 81 MG/DL
POTASSIUM SERPL-SCNC: 4 MMOL/L (ref 3.6–5.5)
PROT SERPL-MCNC: 7 G/DL (ref 6–8.2)
SODIUM SERPL-SCNC: 140 MMOL/L (ref 135–145)
TRIGL SERPL-MCNC: 59 MG/DL (ref 0–149)

## 2024-05-06 ENCOUNTER — HOSPITAL ENCOUNTER (OUTPATIENT)
Dept: CARDIOLOGY | Facility: MEDICAL CENTER | Age: 74
End: 2024-05-06
Payer: MEDICARE

## 2024-05-06 DIAGNOSIS — I27.20 PULMONARY HYPERTENSION (HCC): ICD-10-CM

## 2024-05-06 DIAGNOSIS — Z86.711 HISTORY OF PULMONARY EMBOLUS (PE): ICD-10-CM

## 2024-05-06 DIAGNOSIS — I71.21 ASCENDING AORTIC ANEURYSM, UNSPECIFIED WHETHER RUPTURED (HCC): ICD-10-CM

## 2024-05-06 LAB
LV EJECT FRACT  99904: 55
LV EJECT FRACT MOD 2C 99903: 62.03
LV EJECT FRACT MOD 4C 99902: 54.27
LV EJECT FRACT MOD BP 99901: 57.67

## 2024-05-06 PROCEDURE — 93306 TTE W/DOPPLER COMPLETE: CPT | Mod: 26 | Performed by: INTERNAL MEDICINE

## 2024-05-07 ENCOUNTER — DOCUMENTATION (OUTPATIENT)
Dept: VASCULAR LAB | Facility: MEDICAL CENTER | Age: 74
End: 2024-05-07
Payer: MEDICARE

## 2024-05-08 NOTE — PROGRESS NOTES
Echo with stable small ascending TAA  Mild MR and AI  Will repeat echo in 2 years -May 2026  Will discuss with patient at follow-up visit    Michael J. Bloch, MD  [facility]

## 2024-05-20 ENCOUNTER — OFFICE VISIT (OUTPATIENT)
Dept: VASCULAR LAB | Facility: MEDICAL CENTER | Age: 74
End: 2024-05-20
Attending: NURSE PRACTITIONER
Payer: MEDICARE

## 2024-05-20 ENCOUNTER — APPOINTMENT (OUTPATIENT)
Dept: VASCULAR LAB | Facility: MEDICAL CENTER | Age: 74
End: 2024-05-20
Payer: MEDICARE

## 2024-05-20 ENCOUNTER — TELEPHONE (OUTPATIENT)
Dept: PHARMACY | Facility: MEDICAL CENTER | Age: 74
End: 2024-05-20

## 2024-05-20 VITALS
HEART RATE: 51 BPM | WEIGHT: 179 LBS | BODY MASS INDEX: 32.94 KG/M2 | SYSTOLIC BLOOD PRESSURE: 149 MMHG | DIASTOLIC BLOOD PRESSURE: 85 MMHG | HEIGHT: 62 IN

## 2024-05-20 DIAGNOSIS — E78.5 DYSLIPIDEMIA: ICD-10-CM

## 2024-05-20 DIAGNOSIS — I77.810 MILD DILATION OF ASCENDING AORTA (HCC): ICD-10-CM

## 2024-05-20 DIAGNOSIS — Z86.711 HISTORY OF PULMONARY EMBOLUS (PE): ICD-10-CM

## 2024-05-20 DIAGNOSIS — I82.412 DVT OF DEEP FEMORAL VEIN, LEFT (HCC): ICD-10-CM

## 2024-05-20 DIAGNOSIS — Z86.718 HISTORY OF DVT (DEEP VEIN THROMBOSIS): ICD-10-CM

## 2024-05-20 PROCEDURE — 99214 OFFICE O/P EST MOD 30 MIN: CPT | Performed by: NURSE PRACTITIONER

## 2024-05-20 PROCEDURE — 3079F DIAST BP 80-89 MM HG: CPT | Performed by: NURSE PRACTITIONER

## 2024-05-20 PROCEDURE — 3077F SYST BP >= 140 MM HG: CPT | Performed by: NURSE PRACTITIONER

## 2024-05-20 RX ORDER — EZETIMIBE 10 MG/1
10 TABLET ORAL DAILY
Qty: 100 TABLET | Refills: 3 | Status: SHIPPED | OUTPATIENT
Start: 2024-05-20

## 2024-05-20 ASSESSMENT — ENCOUNTER SYMPTOMS
HEADACHES: 0
DOUBLE VISION: 0
ORTHOPNEA: 0
SHORTNESS OF BREATH: 1
DIZZINESS: 0
SPEECH CHANGE: 0
COUGH: 0
FALLS: 0
BRUISES/BLEEDS EASILY: 0
WHEEZING: 0
WEAKNESS: 0
FOCAL WEAKNESS: 0
MYALGIAS: 0
PALPITATIONS: 0
BLURRED VISION: 0
BLOOD IN STOOL: 0

## 2024-05-20 ASSESSMENT — FIBROSIS 4 INDEX: FIB4 SCORE: 1.58

## 2024-05-20 NOTE — TELEPHONE ENCOUNTER
Received [employer] PA request via [employer]  for Ezetimibe 10mg Tablet. (Quantity:90, Day Supply:90)     Insurance: [employer]  Member ID:  [id number]  BIN: [id number]  PCN: [id number]  Group: [id number]     Ran Test claim via [employer] & medication  does not require PA. $35/90ds. Pt declined [facility] specialty services, releasing to preferred pharmacy on file: [id number] - [place], [place] - [address] [facility] [address]    Thank you,  Tosin Paredes  Pharmacy Liaison  [facility]  [phone]

## 2024-05-20 NOTE — PROGRESS NOTES
Follow- up VASCULAR ANTICOAGULATION VISIT    Regina Cornell is a 72 yo female who presents 05/20/2024 for   Chief Complaint   Patient presents with    Follow-Up     Initially referred by Sia Davis MD for length of therapy (LOT) determination and management of anticoagulation in context of acute venothromboembolic disease    Subjective     Having knee surgery TKR on 6/24/24 with Dr. Longoria at [facility]  He is aware of her hx of blood clots and plans to prescribe Eliquis prophylacticly after surgery    Thoracic Aortic Aneurysm  Type: ascending   Current/interval concerns: none  Current sx: denies chest, back, abdominal pain, dysphagia, worsening cough, hemoptysis.    VTE disease / Anticoagulation:   Current symptoms: none   Current antithrombotic agent:  ASA 162mg daily   Complications: none, tolerating well, no bleeding or bruising   Date of initiation of anticoagulation: 11/2019, cessation 2/2021   Adherence: reports complete, no missed doses     Pertinent VTE pmhx:   Date of Diagnosis: cannot recall exact date 10/30/19  Type of Venous thromboembolic disease (VTE): acute LLE DVT  Type of imaging: duplex, CTPA - repeated 5/2020  Preceding/presenting symptoms: LLE swelling during post-op period after knee surgery - approximately 2 weeks postop had initial duplex, started on eliquis and, despite compliant therapy, then developed acute SOB, CP and found to have PE, switched to VKA as deemed eliquis failure   Antithrombotic therapy at time of VTE event: no  VTE tx course: initiated on Eliquis but then noted to have new onset SOB with CTPA showing possible new PE, transitioned to VKA since   Any personal VTE hx? No  Any family VTE hx? No   UNPROVOKED VS PROVOKED:   Recent surgery ? Yes, Details: 10/16/20 - L knee athroscopy with Dr. Booth  Smoker?  reports that she quit smoking about 47 years ago. Her smoking use included cigarettes. She has never used smokeless tobacco.    Other periods of immobility? Yes,  "Details: post-op     Hyperlipidemia:   Stopped Lipitor 3 weeks ago due to cramping      Current Outpatient Medications:     ezetimibe, 10 mg, Oral, DAILY    fluorouracil, APPLY TWICE DAILY TO SPOT ON FACE FOR TWO WEEKS., Taking    Diclofenac Sodium, 1 Application , Apply externally, 4X/DAY, Taking    aspirin EC, 162 mg, Oral, DAILY, Taking     Social History     Tobacco Use    Smoking status: Former     Current packs/day: 0.00     Types: Cigarettes     Quit date:      Years since quittin.4    Smokeless tobacco: Never   Vaping Use    Vaping status: Never Used   Substance Use Topics    Alcohol use: Yes     Comment: occasional    Drug use: No     DIET AND EXERCISE:  Weight Change: up another 2lbs  BMI Readings from Last 5 Encounters:   24 32.74 kg/m²   10/13/23 31.83 kg/m²   23 31.46 kg/m²   22 30.87 kg/m²   22 31.61 kg/m²     Diet: common adult  Exercise: minimal exercise     Review of Systems   Constitutional:  Negative for malaise/fatigue.   HENT:  Negative for nosebleeds.    Eyes:  Negative for blurred vision and double vision.   Respiratory:  Positive for shortness of breath (chronic). Negative for cough and wheezing.    Cardiovascular:  Negative for chest pain, palpitations, orthopnea and leg swelling.   Gastrointestinal:  Negative for blood in stool and melena.   Genitourinary:  Negative for hematuria.   Musculoskeletal:  Negative for falls and myalgias.   Neurological:  Negative for dizziness, speech change, focal weakness, weakness and headaches.   Endo/Heme/Allergies:  Does not bruise/bleed easily.        Objective     Vitals:    24 1006 24 1011   BP: (!) 166/82 (!) 149/85   BP Location: Left arm Left arm   Patient Position: Sitting Sitting   BP Cuff Size: Adult Adult   Pulse: (!) 55 (!) 51   Weight: 81.2 kg (179 lb)    Height: 1.575 m (5' 2\")       BP Readings from Last 5 Encounters:   24 (!) 149/85   10/13/23 (!) 146/78   23 139/75   22 (!) " 156/78   01/26/22 116/64      Body mass index is 32.74 kg/m².     Physical Exam  Vitals reviewed.   Constitutional:       General: She is not in acute distress.     Appearance: Normal appearance.   HENT:      Head: Normocephalic and atraumatic.   Cardiovascular:      Rate and Rhythm: Regular rhythm. Bradycardia present.      Heart sounds: Normal heart sounds.   Pulmonary:      Effort: Pulmonary effort is normal. No respiratory distress.      Breath sounds: Normal breath sounds. No wheezing or rales.   Musculoskeletal:         General: No swelling. Normal range of motion.      Right lower leg: No edema.      Left lower leg: No edema.   Skin:     General: Skin is warm and dry.      Coloration: Skin is not pale.   Neurological:      General: No focal deficit present.      Mental Status: She is alert and oriented to person, place, and time.      Coordination: Coordination normal.      Gait: Gait normal.   Psychiatric:         Mood and Affect: Mood normal.         Behavior: Behavior normal.         Thought Content: Thought content normal.       Lab Results   Component Value Date/Time    CHOLSTRLTOT 175 05/02/2024 10:53 AM    LDL 81 05/02/2024 10:53 AM    HDL 82 05/02/2024 10:53 AM    TRIGLYCERIDE 59 05/02/2024 10:53 AM       Lab Results   Component Value Date/Time    SODIUM 140 05/02/2024 10:53 AM    POTASSIUM 4.0 05/02/2024 10:53 AM    CHLORIDE 104 05/02/2024 10:53 AM    CO2 20 05/02/2024 10:53 AM    GLUCOSE 98 05/02/2024 10:53 AM    BUN 15 05/02/2024 10:53 AM    CREATININE 0.53 05/02/2024 10:53 AM    CREATININE 0.9 10/05/2007 12:50 PM     Lab Results   Component Value Date/Time    ALKPHOSPHAT 93 05/02/2024 10:53 AM    ASTSGOT 26 05/02/2024 10:53 AM    ALTSGPT 30 05/02/2024 10:53 AM    TBILIRUBIN 0.7 05/02/2024 10:53 AM       Lab Results   Component Value Date    CHOLSTRLTOT 175 05/02/2024    LDL 81 05/02/2024    HDL 82 05/02/2024    TRIGLYCERIDE 59 05/02/2024         Lab Results   Component Value Date    HBA1C 5.5  10/05/2023      Lab Results   Component Value Date    SODIUM 140 2024    POTASSIUM 4.0 2024    CHLORIDE 104 2024    CO2 20 2024    GLUCOSE 98 2024    BUN 15 2024    CREATININE 0.53 2024        Lab Results   Component Value Date    WBC 7.2 2024    RBC 4.91 2024    HEMOGLOBIN 15.0 2024    HEMATOCRIT 45.4 2024    MCV 92.5 2024    MCH 30.5 2024    MCHC 33.0 2024    MPV 10.5 2024     VASCULAR IMAGING:     Last EKG:   Results for orders placed or performed during the hospital encounter of 19   EKG   Result Value Ref Range    Report       [facility] Emergency Dept.    Test Date:  2019  Pt Name:    MICHELET BARNES            Department: [facility]  MRN:        [id number]                      Room:  Gender:     Female                       Technician: [id number]  :        [id number]                   Requested By:ER TRIAGE PROTOCOL  Order #:    [id number]                    Reading MD:    Measurements  Intervals                                Axis  Rate:       54                           P:          41  DE:         160                          QRS:        -4  QRSD:       87                           T:          10  QT:         437  QTc:        415    Interpretive Statements  Sinus rhythm  Probable left atrial enlargement  Probable left ventricular hypertrophy  Borderline T abnormalities, anterior leads  Compared to ECG 10/03/2019 14:34:29  T-wave abnormality now present       Echo 2017  No prior study is available for comparison.   Normal left ventricular systolic function.  Normal left ventricular wall thickness.  Mild aortic stenosis.  Mild tricuspid regurgitation.  Estimated right ventricular systolic pressure is 45 mmHg.  No prior study is available for comparison.   Aorta  Ascending aorta is dilated with a diameter of 4.1 cm.    LLE venous 2019    Venous duplex imaging was performed in only the left  lower extremity    including serial compressions and spectral Doppler flow evaluation.   Partially compressible left femoral vein mid & distal with subacute    appearing softly echogenic material (thrombus) in the lumen. Incompressible    left popliteal and gastrocnemius veins with subacute appearing mixed    echogenic material (thrombus) in the lumen.    Partially compressible left peroneal vein with subacute appearing thrombus    in the lumen.    Echo 11/6/19  Prior Echo - 5/19/17.  Normal left ventricular systolic function.   No evidence of valvular abnormality based on Doppler evaluation.   Compared to the images of the prior study done -  there has been no   significant change.   Ascending aorta is dilated with a diameter of 4.0 cm.    CTPA 11/5/19  1.  Isolated pulmonary embolus is identified in superior segment right lower pulmonary lobe.   2.  RV LV ratio is elevated.   3.  There are 2 right lung pulmonary nodules. Largest measures 9 mm in diameter. Recommend follow-up CT in 3-6 months.    LLE venous 5/22/20    Chronic, partial thrombus with flow recanalization in the LEFT popliteal    vein. Chronic, occlusive thrombus in the LEFT peroneal trunk vein.     Compared to the images of the duplex dated 11/6/19 - there has been an    evolution of the DVT.    CXR 2/2021   No acute cardiopulmonary findings.    VQ scan 2/2021    Low probability of pulmonary embolism.    Echo 2/2021   Compared to the images of the prior study done 11/6/2019 - the estimate   of right ventricular systolic pressure is increased, previously 25   mmHg.  Normal left ventricular systolic function.  Left ventricular ejection fraction is visually estimated to be 65%.  Normal right ventricular size and systolic function.  Mild mitral regurgitation.  Mild aortic insufficiency.  Mild tricuspid regurgitation.  Estimated right ventricular systolic pressure is 40 mmHg.  Ascending aorta is borderline dilated with a diameter of 4.0 cm.    Echo 3/2022    Aorta  Normal aortic root for body surface area. The ascending aorta is   dilated with a diameter of 4-4.2 cm.  The arch is normal size.  The left ventricular ejection fraction is visually estimated to be 65-  70%.  Aortic valve sclerosis without significant stenosis.  Estimated right ventricular systolic pressure is 32-35 mmHg.  The ascending aorta is dilated with a diameter of 4-4.2 cm, difficult   to see borders in some views, difficult to see borders in some views.   Compared to the prior echo on 02/10/21, probably no significant change.    MRA Chest 1/23/23     1.  MRA of the thoracic aorta demonstrates fusiform aneurysm of the ascending thoracic aorta currently measuring 4.1 x 4.0 cm.     2.  No evidence of aortic dissection.     3.  No other aneurysm identified.     4.  Branch vessels of the aorta appear patent.     Echo 5/6/2024  Prior study on 3/24/22, compared to the report of the prior study,   there has been no significant change.   Normal left ventricular systolic function.  The left ventricular ejection fraction is visually estimated to be 55%.  Mild mitral regurgitation.  Mild aortic insufficiency.  Mild tricuspid regurgitation.  Estimated right ventricular systolic pressure is 35 mmHg.  The ascending aorta diameter is 4.1  cm.    Medical Decision Making:  Today's Assessment / Status / Plan:     1. DVT of deep femoral vein, left (HCC)        2. Mild dilation of ascending aorta (HCC)        3. History of DVT (deep vein thrombosis)        4. History of pulmonary embolus (PE)        5. 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   Echo shows no biscupid Ao valve  No fhx of pmhx of connective tissue disease  - reviewed anatomy, risks,  "emergency s/s requiring immediate attention and gave handouts.   -  Echo in 2 years 5/2026  - focus on ARB and BB for BP control if HTN developed   - activity restrictions including no extreme endurance activities, smoking, stimulants, and extreme weight lifting >20lbs     2) VTE disease - stable    -acute LLE DVT, 11/2019  - acute PE, 11/2019  - see OAC plan, no further imaging     ANTITHROMBOTIC THERAPY:  Anti-Platelet/Anti-Coagulant Tx recommended: yes  Indication: hx of surg-associated DVT/PE - 11/2019   Date of initiation: 11/2019   HAS-BLED bleeding risk calc ([email or web address]): 1 pt, 3.4%, low risk  Thrombophilia/hypercoag evaluation:  not recommended  Factors to consider for indefinite OAC: None   Last CBC, BMP: 2019  Expected duration: completed VKA 2/2021   CXR, echo, VQ, DDimer negative 2/2021   Antithrombotic therapy plan:  - agree with ortho recommendations of prophylactic OAC following knee surgery due to history of post surgical thrombosis-- plan to start Eliquis 2.5mg BID x 12 days-- defer to Ortho-- we are happy to prescribe, pt to contact our office if needed  - continue ASA 162mg indefinitely -- will need to stop 5-7 days prior to surgery-- check with ortho  - hold ASA while on Eliquis, then restart once stopped OAC  - continue knee-high compression socks, 20-30mmHg, as much as tolerated    - increase walking, avoid prolonged standing   - elevated legs while sitting and sleeping above heart level  - reduce sodium (\"salt\") in diet to less than 2,000mg daily   - counseled on signs and symptoms of acute VTE that require seeking prompt attention to include shortness of breath, chest pain, pain with deep inhalation, acute leg swelling and/or pain in calf or leg   - elevate legs as much as possible, use compression stockings/socks if directed by your provider  - Avoid hormonal therapies including estrogen or testosterone-containing meds, or raloxifene or tamoxifene (commonly used for osteoporosis)  - Avoid " "sedentary periods  - continue complete avoidance of tobacco products  - if having any invasive procedure,please make sure the doctor knows of your history of blood clots and current anticoagulation status    LIPID MANAGEMENT:   Qualifies for Statin Therapy Based on 2018 ACC/AHA Guidelines: yes, Primary Prevention - 40-76yo, LDLc >70, <190 w/o DM  The 10-year ASCVD risk score (Miya DK, et al., 2019) is: 18.5%  7.5 - <20% \"intermediate risk\" .   Major ASCVD events: None   High-risk conditions: N/A  Risk-enhancers: None  Currently on Statin:  yes  - worsening LDL-C over time from 127 to 158 previously,   Statin therapy can reduce risk of recurrent VTE if off OAC  Had leg/groin pains on rosuvastatin; tolerates atorva at present   Treatment goals: LDL-C <100 (consider non-HDL-C <130, apoB <90)   At goal? Yes 5/2024 (while on Lipitor)  Pt has taken both atorvastatin and rosuvastatin with lifestyle limiting myalgias-- she is very hesitant to try a different statin.  Discussed alternatives and through shared decision making, we have elected to trial a non-statin option.  Explained to pt, she may not achieve her LDL goal of <100 on this medication alone since it only provides about 20% reduction, may need to consider adding nextletol in the future or other statin like pravastatin.   Plan:   - intensify TLC measures as noted - given LDL hand out  - stop atorvastatin   - start vitamin D 5,000 units daily  - start zetia 10mg daily   - increase hydration 60-80 oz per day   - check lipid panel in 3 months (or when able to after knee surgery)    BLOOD PRESSURE MANAGEMENT:  ACC/AHA (2017) goal <130/80  Home BP at goal: yes, 120-126/70s, occasional 130/70 on log   Office BP at goal:  No, mild high SBP   Indications of end organ damage: dilated Ascending Ao, LVH per EKG   Indicated medications for reduction of expansion rate of TAA:   -Beta-blocker (decreases pulsatile wall stress)  -Losartan (inhibits tissue growth factor beta, " decreasing aneurysmal growth rate or rate of expansion)  Plan:   Monitoring:   - continue home BP monitoring; call if consistently >135/85- discussed and demonstrated correct technique at today's visit. Gave home log; instructed to bring to f/u appt  Medications:  Consider losartan as 1st line     GLYCEMIC STATUS:  Prediabetic  A1c improved on most recent labs; down to 5.5 (was 6.0) at highest  -continue to focus on TLC; limit simple carbs/sugars, increase fiber, increase exercise    LIFESTYLE RECOMMENDATIONS:     Smoking:  reports that she quit smoking about 47 years ago. Her smoking use included cigarettes. She has never used smokeless tobacco.   - continued complete avoidance of all tobacco products     Physical Activity: encourage healthy activity to 30 min most days of the week; avoid heavy lifting    Other:  Bradycardia- No HR lowering meds.  denies lightheaded/dizziness, fatigue.  No symptoms with exercise or posture change.  Continue to monitor    Instructed to follow-up with PCP for remainder of adult medical needs: yes  We will partner with other providers in the management of established vascular disease and cardiometabolic risk factors.    Studies to Be Obtained: echo 5/2026  Labs to Be Obtained: lipid    Follow up in: 3 months for lab review     JENNIFER Blake.  [facility]   [facility]   [phone]

## 2024-05-29 ENCOUNTER — HOSPITAL ENCOUNTER (OUTPATIENT)
Dept: RADIOLOGY | Facility: MEDICAL CENTER | Age: 74
End: 2024-05-29
Attending: STUDENT IN AN ORGANIZED HEALTH CARE EDUCATION/TRAINING PROGRAM
Payer: MEDICARE

## 2024-05-29 DIAGNOSIS — M17.11 OSTEOARTHRITIS OF RIGHT KNEE, UNSPECIFIED OSTEOARTHRITIS TYPE: ICD-10-CM

## 2024-06-25 ENCOUNTER — APPOINTMENT (OUTPATIENT)
Dept: ADMISSIONS | Facility: MEDICAL CENTER | Age: 74
End: 2024-06-25
Attending: STUDENT IN AN ORGANIZED HEALTH CARE EDUCATION/TRAINING PROGRAM
Payer: MEDICARE

## 2024-07-02 ENCOUNTER — APPOINTMENT (OUTPATIENT)
Dept: ADMISSIONS | Facility: MEDICAL CENTER | Age: 74
End: 2024-07-02
Attending: STUDENT IN AN ORGANIZED HEALTH CARE EDUCATION/TRAINING PROGRAM
Payer: MEDICARE

## 2024-07-02 VITALS — BODY MASS INDEX: 32.74 KG/M2 | HEIGHT: 62 IN

## 2024-07-02 DIAGNOSIS — Z01.810 PRE-OPERATIVE CARDIOVASCULAR EXAMINATION: ICD-10-CM

## 2024-07-02 DIAGNOSIS — Z01.812 PRE-OPERATIVE LABORATORY EXAMINATION: ICD-10-CM

## 2024-07-02 RX ORDER — NAPROXEN 500 MG/1
TABLET ORAL
COMMUNITY
Start: 2024-05-21 | End: 2024-07-02

## 2024-07-02 RX ORDER — VITAMIN B COMPLEX
1 CAPSULE ORAL DAILY
COMMUNITY

## 2024-07-02 RX ORDER — MELOXICAM 15 MG/1
15 TABLET ORAL DAILY
COMMUNITY
Start: 2024-05-24

## 2024-07-08 ENCOUNTER — PRE-ADMISSION TESTING (OUTPATIENT)
Dept: ADMISSIONS | Facility: MEDICAL CENTER | Age: 74
End: 2024-07-08
Attending: STUDENT IN AN ORGANIZED HEALTH CARE EDUCATION/TRAINING PROGRAM
Payer: MEDICARE

## 2024-07-08 DIAGNOSIS — Z01.812 PRE-OPERATIVE LABORATORY EXAMINATION: ICD-10-CM

## 2024-07-08 DIAGNOSIS — Z01.810 PRE-OPERATIVE CARDIOVASCULAR EXAMINATION: ICD-10-CM

## 2024-07-08 LAB
ALBUMIN SERPL BCP-MCNC: 4.1 G/DL (ref 3.2–4.9)
ALBUMIN/GLOB SERPL: 1.6 G/DL
ALP SERPL-CCNC: 85 U/L (ref 30–99)
ALT SERPL-CCNC: 30 U/L (ref 2–50)
ANION GAP SERPL CALC-SCNC: 15 MMOL/L (ref 7–16)
AST SERPL-CCNC: 22 U/L (ref 12–45)
BILIRUB SERPL-MCNC: 0.4 MG/DL (ref 0.1–1.5)
BUN SERPL-MCNC: 20 MG/DL (ref 8–22)
CALCIUM ALBUM COR SERPL-MCNC: 8.8 MG/DL (ref 8.5–10.5)
CALCIUM SERPL-MCNC: 8.9 MG/DL (ref 8.4–10.2)
CHLORIDE SERPL-SCNC: 110 MMOL/L (ref 96–112)
CO2 SERPL-SCNC: 19 MMOL/L (ref 20–33)
CREAT SERPL-MCNC: 0.54 MG/DL (ref 0.5–1.4)
EKG IMPRESSION: NORMAL
ERYTHROCYTE [DISTWIDTH] IN BLOOD BY AUTOMATED COUNT: 47.7 FL (ref 35.9–50)
GFR SERPLBLD CREATININE-BSD FMLA CKD-EPI: 96 ML/MIN/1.73 M 2
GLOBULIN SER CALC-MCNC: 2.6 G/DL (ref 1.9–3.5)
GLUCOSE SERPL-MCNC: 127 MG/DL (ref 65–99)
HCT VFR BLD AUTO: 43.9 % (ref 37–47)
HGB BLD-MCNC: 14.7 G/DL (ref 12–16)
MCH RBC QN AUTO: 30.8 PG (ref 27–33)
MCHC RBC AUTO-ENTMCNC: 33.5 G/DL (ref 32.2–35.5)
MCV RBC AUTO: 91.8 FL (ref 81.4–97.8)
PLATELET # BLD AUTO: 244 K/UL (ref 164–446)
PMV BLD AUTO: 10.5 FL (ref 9–12.9)
POTASSIUM SERPL-SCNC: 3.2 MMOL/L (ref 3.6–5.5)
PROT SERPL-MCNC: 6.7 G/DL (ref 6–8.2)
RBC # BLD AUTO: 4.78 M/UL (ref 4.2–5.4)
SCCMEC + MECA PNL NOSE NAA+PROBE: NEGATIVE
SCCMEC + MECA PNL NOSE NAA+PROBE: NEGATIVE
SODIUM SERPL-SCNC: 144 MMOL/L (ref 135–145)
WBC # BLD AUTO: 6.8 K/UL (ref 4.8–10.8)

## 2024-07-08 PROCEDURE — 85027 COMPLETE CBC AUTOMATED: CPT

## 2024-07-08 PROCEDURE — 93005 ELECTROCARDIOGRAM TRACING: CPT

## 2024-07-08 PROCEDURE — 93010 ELECTROCARDIOGRAM REPORT: CPT | Performed by: INTERNAL MEDICINE

## 2024-07-08 PROCEDURE — 87641 MR-STAPH DNA AMP PROBE: CPT

## 2024-07-08 PROCEDURE — 80053 COMPREHEN METABOLIC PANEL: CPT

## 2024-07-08 PROCEDURE — 36415 COLL VENOUS BLD VENIPUNCTURE: CPT

## 2024-07-08 PROCEDURE — 87640 STAPH A DNA AMP PROBE: CPT

## 2024-07-08 ASSESSMENT — FIBROSIS 4 INDEX: FIB4 SCORE: 1.58

## 2024-07-22 ENCOUNTER — ANESTHESIA (OUTPATIENT)
Dept: SURGERY | Facility: MEDICAL CENTER | Age: 74
End: 2024-07-22
Payer: MEDICARE

## 2024-07-22 ENCOUNTER — APPOINTMENT (OUTPATIENT)
Dept: RADIOLOGY | Facility: MEDICAL CENTER | Age: 74
End: 2024-07-22
Attending: STUDENT IN AN ORGANIZED HEALTH CARE EDUCATION/TRAINING PROGRAM
Payer: MEDICARE

## 2024-07-22 ENCOUNTER — ANESTHESIA EVENT (OUTPATIENT)
Dept: SURGERY | Facility: MEDICAL CENTER | Age: 74
End: 2024-07-22
Payer: MEDICARE

## 2024-07-22 ENCOUNTER — HOSPITAL ENCOUNTER (OUTPATIENT)
Facility: MEDICAL CENTER | Age: 74
End: 2024-07-23
Attending: STUDENT IN AN ORGANIZED HEALTH CARE EDUCATION/TRAINING PROGRAM | Admitting: STUDENT IN AN ORGANIZED HEALTH CARE EDUCATION/TRAINING PROGRAM
Payer: MEDICARE

## 2024-07-22 PROBLEM — Z96.651 TOTAL KNEE REPLACEMENT STATUS, RIGHT: Status: ACTIVE | Noted: 2024-07-22

## 2024-07-22 PROCEDURE — A9270 NON-COVERED ITEM OR SERVICE: HCPCS | Performed by: ANESTHESIOLOGY

## 2024-07-22 PROCEDURE — 700105 HCHG RX REV CODE 258: Performed by: ANESTHESIOLOGY

## 2024-07-22 PROCEDURE — 160009 HCHG ANES TIME/MIN: Performed by: STUDENT IN AN ORGANIZED HEALTH CARE EDUCATION/TRAINING PROGRAM

## 2024-07-22 PROCEDURE — 73560 X-RAY EXAM OF KNEE 1 OR 2: CPT | Mod: RT

## 2024-07-22 PROCEDURE — 160035 HCHG PACU - 1ST 60 MINS PHASE I: Performed by: STUDENT IN AN ORGANIZED HEALTH CARE EDUCATION/TRAINING PROGRAM

## 2024-07-22 PROCEDURE — 700111 HCHG RX REV CODE 636 W/ 250 OVERRIDE (IP): Mod: JZ | Performed by: ANESTHESIOLOGY

## 2024-07-22 PROCEDURE — 502714 HCHG ROBOTIC SURGERY SERVICES: Performed by: STUDENT IN AN ORGANIZED HEALTH CARE EDUCATION/TRAINING PROGRAM

## 2024-07-22 PROCEDURE — 700101 HCHG RX REV CODE 250: Performed by: ANESTHESIOLOGY

## 2024-07-22 PROCEDURE — RXMED WILLOW AMBULATORY MEDICATION CHARGE: Performed by: STUDENT IN AN ORGANIZED HEALTH CARE EDUCATION/TRAINING PROGRAM

## 2024-07-22 PROCEDURE — 160048 HCHG OR STATISTICAL LEVEL 1-5: Performed by: STUDENT IN AN ORGANIZED HEALTH CARE EDUCATION/TRAINING PROGRAM

## 2024-07-22 PROCEDURE — 160036 HCHG PACU - EA ADDL 30 MINS PHASE I: Performed by: STUDENT IN AN ORGANIZED HEALTH CARE EDUCATION/TRAINING PROGRAM

## 2024-07-22 PROCEDURE — C1776 JOINT DEVICE (IMPLANTABLE): HCPCS | Performed by: STUDENT IN AN ORGANIZED HEALTH CARE EDUCATION/TRAINING PROGRAM

## 2024-07-22 PROCEDURE — A9270 NON-COVERED ITEM OR SERVICE: HCPCS | Performed by: STUDENT IN AN ORGANIZED HEALTH CARE EDUCATION/TRAINING PROGRAM

## 2024-07-22 PROCEDURE — C1713 ANCHOR/SCREW BN/BN,TIS/BN: HCPCS | Performed by: STUDENT IN AN ORGANIZED HEALTH CARE EDUCATION/TRAINING PROGRAM

## 2024-07-22 PROCEDURE — 160002 HCHG RECOVERY MINUTES (STAT): Performed by: STUDENT IN AN ORGANIZED HEALTH CARE EDUCATION/TRAINING PROGRAM

## 2024-07-22 PROCEDURE — 700102 HCHG RX REV CODE 250 W/ 637 OVERRIDE(OP): Performed by: STUDENT IN AN ORGANIZED HEALTH CARE EDUCATION/TRAINING PROGRAM

## 2024-07-22 PROCEDURE — 502000 HCHG MISC OR IMPLANTS RC 0278: Performed by: STUDENT IN AN ORGANIZED HEALTH CARE EDUCATION/TRAINING PROGRAM

## 2024-07-22 PROCEDURE — 700102 HCHG RX REV CODE 250 W/ 637 OVERRIDE(OP): Performed by: ANESTHESIOLOGY

## 2024-07-22 PROCEDURE — 94760 N-INVAS EAR/PLS OXIMETRY 1: CPT

## 2024-07-22 PROCEDURE — 770030 HCHG ROOM/CARE - EXTENDED RECOVERY EACH 15 MIN

## 2024-07-22 PROCEDURE — 160031 HCHG SURGERY MINUTES - 1ST 30 MINS LEVEL 5: Performed by: STUDENT IN AN ORGANIZED HEALTH CARE EDUCATION/TRAINING PROGRAM

## 2024-07-22 PROCEDURE — 73560 X-RAY EXAM OF KNEE 1 OR 2: CPT | Mod: RT,XU

## 2024-07-22 PROCEDURE — 700105 HCHG RX REV CODE 258: Performed by: STUDENT IN AN ORGANIZED HEALTH CARE EDUCATION/TRAINING PROGRAM

## 2024-07-22 PROCEDURE — 700111 HCHG RX REV CODE 636 W/ 250 OVERRIDE (IP): Performed by: STUDENT IN AN ORGANIZED HEALTH CARE EDUCATION/TRAINING PROGRAM

## 2024-07-22 PROCEDURE — 160042 HCHG SURGERY MINUTES - EA ADDL 1 MIN LEVEL 5: Performed by: STUDENT IN AN ORGANIZED HEALTH CARE EDUCATION/TRAINING PROGRAM

## 2024-07-22 PROCEDURE — 64447 NJX AA&/STRD FEMORAL NRV IMG: CPT | Performed by: STUDENT IN AN ORGANIZED HEALTH CARE EDUCATION/TRAINING PROGRAM

## 2024-07-22 DEVICE — CEMENT BONE SIMPLEX W/GENTAICIN (10/PK): Type: IMPLANTABLE DEVICE | Site: KNEE | Status: FUNCTIONAL

## 2024-07-22 DEVICE — IMPLANTABLE DEVICE: Type: IMPLANTABLE DEVICE | Site: KNEE | Status: FUNCTIONAL

## 2024-07-22 RX ORDER — OXYCODONE HCL 5 MG/5 ML
5 SOLUTION, ORAL ORAL
Status: COMPLETED | OUTPATIENT
Start: 2024-07-22 | End: 2024-07-22

## 2024-07-22 RX ORDER — ROPIVACAINE HYDROCHLORIDE 5 MG/ML
INJECTION, SOLUTION EPIDURAL; INFILTRATION; PERINEURAL
Status: DISCONTINUED | OUTPATIENT
Start: 2024-07-22 | End: 2024-07-22 | Stop reason: HOSPADM

## 2024-07-22 RX ORDER — VANCOMYCIN HYDROCHLORIDE 1 G/20ML
INJECTION, POWDER, LYOPHILIZED, FOR SOLUTION INTRAVENOUS
Status: COMPLETED | OUTPATIENT
Start: 2024-07-22 | End: 2024-07-22

## 2024-07-22 RX ORDER — HYDROMORPHONE HYDROCHLORIDE 1 MG/ML
0.5 INJECTION, SOLUTION INTRAMUSCULAR; INTRAVENOUS; SUBCUTANEOUS
Status: DISCONTINUED | OUTPATIENT
Start: 2024-07-22 | End: 2024-07-23 | Stop reason: HOSPADM

## 2024-07-22 RX ORDER — DIPHENHYDRAMINE HYDROCHLORIDE 50 MG/ML
25 INJECTION INTRAMUSCULAR; INTRAVENOUS EVERY 6 HOURS PRN
Status: DISCONTINUED | OUTPATIENT
Start: 2024-07-22 | End: 2024-07-23 | Stop reason: HOSPADM

## 2024-07-22 RX ORDER — AMOXICILLIN 250 MG
1 CAPSULE ORAL NIGHTLY
Status: DISCONTINUED | OUTPATIENT
Start: 2024-07-22 | End: 2024-07-23 | Stop reason: HOSPADM

## 2024-07-22 RX ORDER — HYDRALAZINE HYDROCHLORIDE 20 MG/ML
5 INJECTION INTRAMUSCULAR; INTRAVENOUS
Status: DISCONTINUED | OUTPATIENT
Start: 2024-07-22 | End: 2024-07-22 | Stop reason: HOSPADM

## 2024-07-22 RX ORDER — DIPHENHYDRAMINE HYDROCHLORIDE 50 MG/ML
12.5 INJECTION INTRAMUSCULAR; INTRAVENOUS
Status: DISCONTINUED | OUTPATIENT
Start: 2024-07-22 | End: 2024-07-22 | Stop reason: HOSPADM

## 2024-07-22 RX ORDER — SODIUM CHLORIDE, SODIUM LACTATE, POTASSIUM CHLORIDE, CALCIUM CHLORIDE 600; 310; 30; 20 MG/100ML; MG/100ML; MG/100ML; MG/100ML
INJECTION, SOLUTION INTRAVENOUS
Status: DISCONTINUED | OUTPATIENT
Start: 2024-07-22 | End: 2024-07-22 | Stop reason: SURG

## 2024-07-22 RX ORDER — HYDROMORPHONE HYDROCHLORIDE 1 MG/ML
0.1 INJECTION, SOLUTION INTRAMUSCULAR; INTRAVENOUS; SUBCUTANEOUS
Status: DISCONTINUED | OUTPATIENT
Start: 2024-07-22 | End: 2024-07-22 | Stop reason: HOSPADM

## 2024-07-22 RX ORDER — HYDROMORPHONE HYDROCHLORIDE 2 MG/ML
INJECTION, SOLUTION INTRAMUSCULAR; INTRAVENOUS; SUBCUTANEOUS PRN
Status: DISCONTINUED | OUTPATIENT
Start: 2024-07-22 | End: 2024-07-22 | Stop reason: SURG

## 2024-07-22 RX ORDER — LABETALOL HYDROCHLORIDE 5 MG/ML
5 INJECTION, SOLUTION INTRAVENOUS
Status: DISCONTINUED | OUTPATIENT
Start: 2024-07-22 | End: 2024-07-22 | Stop reason: HOSPADM

## 2024-07-22 RX ORDER — POLYETHYLENE GLYCOL 3350 17 G/17G
1 POWDER, FOR SOLUTION ORAL 2 TIMES DAILY PRN
Status: DISCONTINUED | OUTPATIENT
Start: 2024-07-22 | End: 2024-07-23 | Stop reason: HOSPADM

## 2024-07-22 RX ORDER — HALOPERIDOL 5 MG/ML
1 INJECTION INTRAMUSCULAR
Status: DISCONTINUED | OUTPATIENT
Start: 2024-07-22 | End: 2024-07-22 | Stop reason: HOSPADM

## 2024-07-22 RX ORDER — OXYCODONE HYDROCHLORIDE 5 MG/1
5 TABLET ORAL
Status: DISCONTINUED | OUTPATIENT
Start: 2024-07-22 | End: 2024-07-23 | Stop reason: HOSPADM

## 2024-07-22 RX ORDER — HYDROMORPHONE HYDROCHLORIDE 1 MG/ML
0.2 INJECTION, SOLUTION INTRAMUSCULAR; INTRAVENOUS; SUBCUTANEOUS
Status: DISCONTINUED | OUTPATIENT
Start: 2024-07-22 | End: 2024-07-22 | Stop reason: HOSPADM

## 2024-07-22 RX ORDER — KETOROLAC TROMETHAMINE 30 MG/ML
INJECTION, SOLUTION INTRAMUSCULAR; INTRAVENOUS
Status: DISCONTINUED | OUTPATIENT
Start: 2024-07-22 | End: 2024-07-22 | Stop reason: HOSPADM

## 2024-07-22 RX ORDER — DEXAMETHASONE SODIUM PHOSPHATE 4 MG/ML
4 INJECTION, SOLUTION INTRA-ARTICULAR; INTRALESIONAL; INTRAMUSCULAR; INTRAVENOUS; SOFT TISSUE
Status: DISCONTINUED | OUTPATIENT
Start: 2024-07-22 | End: 2024-07-23 | Stop reason: HOSPADM

## 2024-07-22 RX ORDER — CEFAZOLIN SODIUM 1 G/3ML
INJECTION, POWDER, FOR SOLUTION INTRAMUSCULAR; INTRAVENOUS PRN
Status: DISCONTINUED | OUTPATIENT
Start: 2024-07-22 | End: 2024-07-22 | Stop reason: SURG

## 2024-07-22 RX ORDER — ENEMA 19; 7 G/133ML; G/133ML
1 ENEMA RECTAL
Status: DISCONTINUED | OUTPATIENT
Start: 2024-07-22 | End: 2024-07-23 | Stop reason: HOSPADM

## 2024-07-22 RX ORDER — AMOXICILLIN 250 MG
1 CAPSULE ORAL
Status: DISCONTINUED | OUTPATIENT
Start: 2024-07-22 | End: 2024-07-23 | Stop reason: HOSPADM

## 2024-07-22 RX ORDER — SCOLOPAMINE TRANSDERMAL SYSTEM 1 MG/1
1 PATCH, EXTENDED RELEASE TRANSDERMAL
Status: DISCONTINUED | OUTPATIENT
Start: 2024-07-22 | End: 2024-07-23 | Stop reason: HOSPADM

## 2024-07-22 RX ORDER — ACETAMINOPHEN 500 MG
1000 TABLET ORAL ONCE
Status: COMPLETED | OUTPATIENT
Start: 2024-07-22 | End: 2024-07-22

## 2024-07-22 RX ORDER — DOCUSATE SODIUM 100 MG/1
100 CAPSULE, LIQUID FILLED ORAL 2 TIMES DAILY
Status: DISCONTINUED | OUTPATIENT
Start: 2024-07-22 | End: 2024-07-23 | Stop reason: HOSPADM

## 2024-07-22 RX ORDER — IBUPROFEN 400 MG/1
800 TABLET ORAL 3 TIMES DAILY PRN
Status: DISCONTINUED | OUTPATIENT
Start: 2024-07-25 | End: 2024-07-23 | Stop reason: HOSPADM

## 2024-07-22 RX ORDER — ACETAMINOPHEN 500 MG
1000 TABLET ORAL EVERY 6 HOURS PRN
Status: DISCONTINUED | OUTPATIENT
Start: 2024-07-27 | End: 2024-07-23 | Stop reason: HOSPADM

## 2024-07-22 RX ORDER — ONDANSETRON 2 MG/ML
INJECTION INTRAMUSCULAR; INTRAVENOUS PRN
Status: DISCONTINUED | OUTPATIENT
Start: 2024-07-22 | End: 2024-07-22 | Stop reason: SURG

## 2024-07-22 RX ORDER — OXYCODONE HCL 5 MG/5 ML
10 SOLUTION, ORAL ORAL
Status: COMPLETED | OUTPATIENT
Start: 2024-07-22 | End: 2024-07-22

## 2024-07-22 RX ORDER — EPINEPHRINE 1 MG/ML(1)
AMPUL (ML) INJECTION
Status: DISCONTINUED | OUTPATIENT
Start: 2024-07-22 | End: 2024-07-22 | Stop reason: HOSPADM

## 2024-07-22 RX ORDER — ACETAMINOPHEN 500 MG
1000 TABLET ORAL EVERY 6 HOURS
Status: DISCONTINUED | OUTPATIENT
Start: 2024-07-22 | End: 2024-07-23 | Stop reason: HOSPADM

## 2024-07-22 RX ORDER — HYDROMORPHONE HYDROCHLORIDE 1 MG/ML
0.4 INJECTION, SOLUTION INTRAMUSCULAR; INTRAVENOUS; SUBCUTANEOUS
Status: DISCONTINUED | OUTPATIENT
Start: 2024-07-22 | End: 2024-07-22 | Stop reason: HOSPADM

## 2024-07-22 RX ORDER — BUPIVACAINE HYDROCHLORIDE 5 MG/ML
INJECTION, SOLUTION EPIDURAL; INTRACAUDAL PRN
Status: DISCONTINUED | OUTPATIENT
Start: 2024-07-22 | End: 2024-07-22 | Stop reason: SURG

## 2024-07-22 RX ORDER — KETOROLAC TROMETHAMINE 15 MG/ML
15 INJECTION, SOLUTION INTRAMUSCULAR; INTRAVENOUS EVERY 6 HOURS
Status: DISCONTINUED | OUTPATIENT
Start: 2024-07-22 | End: 2024-07-23 | Stop reason: HOSPADM

## 2024-07-22 RX ORDER — OXYCODONE HYDROCHLORIDE 10 MG/1
10 TABLET ORAL
Status: DISCONTINUED | OUTPATIENT
Start: 2024-07-22 | End: 2024-07-23 | Stop reason: HOSPADM

## 2024-07-22 RX ORDER — CELECOXIB 200 MG/1
200 CAPSULE ORAL ONCE
Status: COMPLETED | OUTPATIENT
Start: 2024-07-22 | End: 2024-07-22

## 2024-07-22 RX ORDER — MORPHINE SULFATE 0.5 MG/ML
INJECTION, SOLUTION EPIDURAL; INTRATHECAL; INTRAVENOUS
Status: DISCONTINUED | OUTPATIENT
Start: 2024-07-22 | End: 2024-07-22 | Stop reason: HOSPADM

## 2024-07-22 RX ORDER — DEXAMETHASONE SODIUM PHOSPHATE 4 MG/ML
INJECTION, SOLUTION INTRA-ARTICULAR; INTRALESIONAL; INTRAMUSCULAR; INTRAVENOUS; SOFT TISSUE PRN
Status: DISCONTINUED | OUTPATIENT
Start: 2024-07-22 | End: 2024-07-22 | Stop reason: SURG

## 2024-07-22 RX ORDER — BISACODYL 10 MG
10 SUPPOSITORY, RECTAL RECTAL
Status: DISCONTINUED | OUTPATIENT
Start: 2024-07-22 | End: 2024-07-23 | Stop reason: HOSPADM

## 2024-07-22 RX ORDER — HALOPERIDOL 5 MG/ML
1 INJECTION INTRAMUSCULAR EVERY 6 HOURS PRN
Status: DISCONTINUED | OUTPATIENT
Start: 2024-07-22 | End: 2024-07-23 | Stop reason: HOSPADM

## 2024-07-22 RX ORDER — EPHEDRINE SULFATE 50 MG/ML
5 INJECTION, SOLUTION INTRAVENOUS
Status: DISCONTINUED | OUTPATIENT
Start: 2024-07-22 | End: 2024-07-22 | Stop reason: HOSPADM

## 2024-07-22 RX ORDER — SODIUM CHLORIDE, SODIUM LACTATE, POTASSIUM CHLORIDE, CALCIUM CHLORIDE 600; 310; 30; 20 MG/100ML; MG/100ML; MG/100ML; MG/100ML
INJECTION, SOLUTION INTRAVENOUS CONTINUOUS
Status: ACTIVE | OUTPATIENT
Start: 2024-07-22 | End: 2024-07-22

## 2024-07-22 RX ORDER — LIDOCAINE HYDROCHLORIDE 20 MG/ML
INJECTION, SOLUTION EPIDURAL; INFILTRATION; INTRACAUDAL; PERINEURAL PRN
Status: DISCONTINUED | OUTPATIENT
Start: 2024-07-22 | End: 2024-07-22 | Stop reason: SURG

## 2024-07-22 RX ORDER — TRANEXAMIC ACID 100 MG/ML
INJECTION, SOLUTION INTRAVENOUS PRN
Status: DISCONTINUED | OUTPATIENT
Start: 2024-07-22 | End: 2024-07-22 | Stop reason: SURG

## 2024-07-22 RX ORDER — ONDANSETRON 2 MG/ML
4 INJECTION INTRAMUSCULAR; INTRAVENOUS EVERY 4 HOURS PRN
Status: DISCONTINUED | OUTPATIENT
Start: 2024-07-22 | End: 2024-07-23 | Stop reason: HOSPADM

## 2024-07-22 RX ORDER — SODIUM CHLORIDE, SODIUM LACTATE, POTASSIUM CHLORIDE, CALCIUM CHLORIDE 600; 310; 30; 20 MG/100ML; MG/100ML; MG/100ML; MG/100ML
INJECTION, SOLUTION INTRAVENOUS CONTINUOUS
Status: DISCONTINUED | OUTPATIENT
Start: 2024-07-22 | End: 2024-07-22 | Stop reason: HOSPADM

## 2024-07-22 RX ORDER — ONDANSETRON 2 MG/ML
4 INJECTION INTRAMUSCULAR; INTRAVENOUS
Status: DISCONTINUED | OUTPATIENT
Start: 2024-07-22 | End: 2024-07-22 | Stop reason: HOSPADM

## 2024-07-22 RX ADMIN — CEFAZOLIN 2 G: 2 INJECTION, POWDER, FOR SOLUTION INTRAMUSCULAR; INTRAVENOUS at 19:48

## 2024-07-22 RX ADMIN — KETOROLAC TROMETHAMINE 15 MG: 15 INJECTION, SOLUTION INTRAMUSCULAR; INTRAVENOUS at 19:41

## 2024-07-22 RX ADMIN — FENTANYL CITRATE 50 MCG: 50 INJECTION, SOLUTION INTRAMUSCULAR; INTRAVENOUS at 17:16

## 2024-07-22 RX ADMIN — HYDROMORPHONE HYDROCHLORIDE 0.5 MG: 2 INJECTION INTRAMUSCULAR; INTRAVENOUS; SUBCUTANEOUS at 15:04

## 2024-07-22 RX ADMIN — ACETAMINOPHEN 1000 MG: 500 TABLET ORAL at 19:42

## 2024-07-22 RX ADMIN — CELECOXIB 200 MG: 200 CAPSULE ORAL at 10:43

## 2024-07-22 RX ADMIN — ACETAMINOPHEN 1000 MG: 500 TABLET, FILM COATED ORAL at 10:43

## 2024-07-22 RX ADMIN — TRANEXAMIC ACID 1000 MG: 100 INJECTION, SOLUTION INTRAVENOUS at 15:05

## 2024-07-22 RX ADMIN — PROPOFOL 200 MG: 10 INJECTION, EMULSION INTRAVENOUS at 15:04

## 2024-07-22 RX ADMIN — LIDOCAINE HYDROCHLORIDE 50 MG: 20 INJECTION, SOLUTION EPIDURAL; INFILTRATION; INTRACAUDAL at 15:04

## 2024-07-22 RX ADMIN — FENTANYL CITRATE 50 MCG: 50 INJECTION, SOLUTION INTRAMUSCULAR; INTRAVENOUS at 15:40

## 2024-07-22 RX ADMIN — SENNOSIDES AND DOCUSATE SODIUM 1 TABLET: 50; 8.6 TABLET ORAL at 21:06

## 2024-07-22 RX ADMIN — ONDANSETRON 4 MG: 2 INJECTION INTRAMUSCULAR; INTRAVENOUS at 16:36

## 2024-07-22 RX ADMIN — OXYCODONE HYDROCHLORIDE 10 MG: 5 SOLUTION ORAL at 17:16

## 2024-07-22 RX ADMIN — SODIUM CHLORIDE, POTASSIUM CHLORIDE, SODIUM LACTATE AND CALCIUM CHLORIDE: 600; 310; 30; 20 INJECTION, SOLUTION INTRAVENOUS at 14:52

## 2024-07-22 RX ADMIN — FENTANYL CITRATE 50 MCG: 50 INJECTION, SOLUTION INTRAMUSCULAR; INTRAVENOUS at 15:32

## 2024-07-22 RX ADMIN — TRANEXAMIC ACID 1000 MG: 100 INJECTION, SOLUTION INTRAVENOUS at 16:36

## 2024-07-22 RX ADMIN — CEFAZOLIN 2 G: 1 INJECTION, POWDER, FOR SOLUTION INTRAMUSCULAR; INTRAVENOUS at 15:05

## 2024-07-22 RX ADMIN — DEXAMETHASONE SODIUM PHOSPHATE 8 MG: 4 INJECTION INTRA-ARTICULAR; INTRALESIONAL; INTRAMUSCULAR; INTRAVENOUS; SOFT TISSUE at 15:05

## 2024-07-22 RX ADMIN — DOCUSATE SODIUM 100 MG: 100 CAPSULE, LIQUID FILLED ORAL at 19:42

## 2024-07-22 RX ADMIN — BUPIVACAINE HYDROCHLORIDE 20 ML: 5 INJECTION, SOLUTION EPIDURAL; INTRACAUDAL at 14:54

## 2024-07-22 SDOH — ECONOMIC STABILITY: TRANSPORTATION INSECURITY
IN THE PAST 12 MONTHS, HAS LACK OF RELIABLE TRANSPORTATION KEPT YOU FROM MEDICAL APPOINTMENTS, MEETINGS, WORK OR FROM GETTING THINGS NEEDED FOR DAILY LIVING?: NO

## 2024-07-22 SDOH — ECONOMIC STABILITY: TRANSPORTATION INSECURITY
IN THE PAST 12 MONTHS, HAS THE LACK OF TRANSPORTATION KEPT YOU FROM MEDICAL APPOINTMENTS OR FROM GETTING MEDICATIONS?: NO

## 2024-07-22 ASSESSMENT — FIBROSIS 4 INDEX: FIB4 SCORE: 1.22

## 2024-07-22 ASSESSMENT — PAIN DESCRIPTION - PAIN TYPE
TYPE: CHRONIC PAIN
TYPE: SURGICAL PAIN
TYPE: SURGICAL PAIN

## 2024-07-22 ASSESSMENT — SOCIAL DETERMINANTS OF HEALTH (SDOH)
WITHIN THE PAST 12 MONTHS, YOU WORRIED THAT YOUR FOOD WOULD RUN OUT BEFORE YOU GOT THE MONEY TO BUY MORE: NEVER TRUE
IN THE PAST 12 MONTHS, HAS THE ELECTRIC, GAS, OIL, OR WATER COMPANY THREATENED TO SHUT OFF SERVICE IN YOUR HOME?: NO
WITHIN THE PAST 12 MONTHS, THE FOOD YOU BOUGHT JUST DIDN'T LAST AND YOU DIDN'T HAVE MONEY TO GET MORE: NEVER TRUE

## 2024-07-22 ASSESSMENT — PATIENT HEALTH QUESTIONNAIRE - PHQ9
1. LITTLE INTEREST OR PLEASURE IN DOING THINGS: NOT AT ALL
2. FEELING DOWN, DEPRESSED, IRRITABLE, OR HOPELESS: NOT AT ALL
SUM OF ALL RESPONSES TO PHQ9 QUESTIONS 1 AND 2: 0

## 2024-07-22 ASSESSMENT — PAIN SCALES - GENERAL: PAIN_LEVEL: 1

## 2024-07-23 ENCOUNTER — PHARMACY VISIT (OUTPATIENT)
Dept: PHARMACY | Facility: MEDICAL CENTER | Age: 74
End: 2024-07-23
Payer: COMMERCIAL

## 2024-07-23 VITALS
HEIGHT: 62 IN | OXYGEN SATURATION: 93 % | BODY MASS INDEX: 32.15 KG/M2 | TEMPERATURE: 97.5 F | RESPIRATION RATE: 16 BRPM | WEIGHT: 174.71 LBS | SYSTOLIC BLOOD PRESSURE: 106 MMHG | HEART RATE: 64 BPM | DIASTOLIC BLOOD PRESSURE: 54 MMHG

## 2024-07-23 PROCEDURE — 770030 HCHG ROOM/CARE - EXTENDED RECOVERY EACH 15 MIN

## 2024-07-23 PROCEDURE — 97110 THERAPEUTIC EXERCISES: CPT

## 2024-07-23 PROCEDURE — A9270 NON-COVERED ITEM OR SERVICE: HCPCS | Performed by: STUDENT IN AN ORGANIZED HEALTH CARE EDUCATION/TRAINING PROGRAM

## 2024-07-23 PROCEDURE — 700111 HCHG RX REV CODE 636 W/ 250 OVERRIDE (IP): Mod: JZ | Performed by: STUDENT IN AN ORGANIZED HEALTH CARE EDUCATION/TRAINING PROGRAM

## 2024-07-23 PROCEDURE — 97161 PT EVAL LOW COMPLEX 20 MIN: CPT

## 2024-07-23 PROCEDURE — 700102 HCHG RX REV CODE 250 W/ 637 OVERRIDE(OP): Performed by: STUDENT IN AN ORGANIZED HEALTH CARE EDUCATION/TRAINING PROGRAM

## 2024-07-23 PROCEDURE — 97116 GAIT TRAINING THERAPY: CPT

## 2024-07-23 PROCEDURE — 97760 ORTHOTIC MGMT&TRAING 1ST ENC: CPT

## 2024-07-23 RX ADMIN — ACETAMINOPHEN 1000 MG: 500 TABLET ORAL at 05:15

## 2024-07-23 RX ADMIN — OXYCODONE HYDROCHLORIDE 5 MG: 5 TABLET ORAL at 09:21

## 2024-07-23 RX ADMIN — KETOROLAC TROMETHAMINE 15 MG: 15 INJECTION, SOLUTION INTRAMUSCULAR; INTRAVENOUS at 05:17

## 2024-07-23 RX ADMIN — KETOROLAC TROMETHAMINE 15 MG: 15 INJECTION, SOLUTION INTRAMUSCULAR; INTRAVENOUS at 00:07

## 2024-07-23 RX ADMIN — APIXABAN 2.5 MG: 2.5 TABLET, FILM COATED ORAL at 09:21

## 2024-07-23 RX ADMIN — DOCUSATE SODIUM 100 MG: 100 CAPSULE, LIQUID FILLED ORAL at 05:16

## 2024-07-23 RX ADMIN — ACETAMINOPHEN 1000 MG: 500 TABLET ORAL at 00:07

## 2024-07-23 ASSESSMENT — COGNITIVE AND FUNCTIONAL STATUS - GENERAL
SUGGESTED CMS G CODE MODIFIER DAILY ACTIVITY: CJ
CLIMB 3 TO 5 STEPS WITH RAILING: A LITTLE
CLIMB 3 TO 5 STEPS WITH RAILING: A LITTLE
MOBILITY SCORE: 21
WALKING IN HOSPITAL ROOM: A LITTLE
DAILY ACTIVITIY SCORE: 22
DRESSING REGULAR LOWER BODY CLOTHING: A LITTLE
SUGGESTED CMS G CODE MODIFIER MOBILITY: CJ
MOBILITY SCORE: 21
TOILETING: A LITTLE
SUGGESTED CMS G CODE MODIFIER MOBILITY: CJ
STANDING UP FROM CHAIR USING ARMS: A LITTLE
WALKING IN HOSPITAL ROOM: A LITTLE
MOVING TO AND FROM BED TO CHAIR: A LITTLE

## 2024-07-23 ASSESSMENT — LIFESTYLE VARIABLES
TOTAL SCORE: 0
HAVE PEOPLE ANNOYED YOU BY CRITICIZING YOUR DRINKING: NO
AVERAGE NUMBER OF DAYS PER WEEK YOU HAVE A DRINK CONTAINING ALCOHOL: 0
TOTAL SCORE: 0
ON A TYPICAL DAY WHEN YOU DRINK ALCOHOL HOW MANY DRINKS DO YOU HAVE: 0
DOES PATIENT WANT TO STOP DRINKING: NO
EVER HAD A DRINK FIRST THING IN THE MORNING TO STEADY YOUR NERVES TO GET RID OF A HANGOVER: NO
HOW MANY TIMES IN THE PAST YEAR HAVE YOU HAD 5 OR MORE DRINKS IN A DAY: 0
TOTAL SCORE: 0
HAVE YOU EVER FELT YOU SHOULD CUT DOWN ON YOUR DRINKING: NO
EVER FELT BAD OR GUILTY ABOUT YOUR DRINKING: NO
ALCOHOL_USE: NO
CONSUMPTION TOTAL: NEGATIVE

## 2024-07-23 ASSESSMENT — GAIT ASSESSMENTS
DEVIATION: ANTALGIC;STEP TO
DISTANCE (FEET): 150
GAIT LEVEL OF ASSIST: STANDBY ASSIST
ASSISTIVE DEVICE: FRONT WHEEL WALKER

## 2024-07-23 ASSESSMENT — PAIN DESCRIPTION - PAIN TYPE
TYPE: SURGICAL PAIN
TYPE: SURGICAL PAIN

## 2024-08-05 ENCOUNTER — HOSPITAL ENCOUNTER (EMERGENCY)
Facility: MEDICAL CENTER | Age: 74
End: 2024-08-05
Attending: EMERGENCY MEDICINE
Payer: MEDICARE

## 2024-08-05 ENCOUNTER — APPOINTMENT (OUTPATIENT)
Dept: RADIOLOGY | Facility: MEDICAL CENTER | Age: 74
End: 2024-08-05
Attending: EMERGENCY MEDICINE
Payer: MEDICARE

## 2024-08-05 VITALS
DIASTOLIC BLOOD PRESSURE: 75 MMHG | RESPIRATION RATE: 16 BRPM | SYSTOLIC BLOOD PRESSURE: 123 MMHG | HEIGHT: 62 IN | HEART RATE: 60 BPM | TEMPERATURE: 97.8 F | WEIGHT: 171.3 LBS | OXYGEN SATURATION: 96 % | BODY MASS INDEX: 31.52 KG/M2

## 2024-08-05 DIAGNOSIS — I82.441 ACUTE DEEP VEIN THROMBOSIS (DVT) OF TIBIAL VEIN OF RIGHT LOWER EXTREMITY (HCC): ICD-10-CM

## 2024-08-05 PROCEDURE — 93971 EXTREMITY STUDY: CPT | Mod: RT

## 2024-08-05 PROCEDURE — 93971 EXTREMITY STUDY: CPT | Mod: 26,RT | Performed by: INTERNAL MEDICINE

## 2024-08-05 PROCEDURE — 99284 EMERGENCY DEPT VISIT MOD MDM: CPT

## 2024-08-05 RX ORDER — FLUTICASONE PROPIONATE 50 MCG
1 SPRAY, SUSPENSION (ML) NASAL
COMMUNITY

## 2024-08-05 RX ORDER — HYDRALAZINE HYDROCHLORIDE 25 MG/1
25 TABLET, FILM COATED ORAL ONCE
Status: DISCONTINUED | OUTPATIENT
Start: 2024-08-05 | End: 2024-08-05

## 2024-08-05 ASSESSMENT — FIBROSIS 4 INDEX: FIB4 SCORE: 1.22

## 2024-08-05 NOTE — ED NOTES
Medication history reviewed with pt. Med rec is complete.  Allergies reviewed, per pt  Interviewed pt with  at bedside with permission from pt.    Pt has held all her vitamins and ASPRIN 81MG since 7/15/2024, because of surgery     Patient has not had any outpatient antibiotics in the last 30 days.    Pt is on anticoagulants, pt takes ELIQUIS 2.5MG.  Last dose was taken today (8/5/2024) at 0800.

## 2024-08-05 NOTE — ED NOTES
In rm 8b,  present. Denies joshua discomfort to RLE, but RLE is slightly more pinker and more swollener and more warmer when compared to LLE.   Awaiting imaging.

## 2024-08-05 NOTE — ED TRIAGE NOTES
Regina Cornell  73 y.o.  Chief Complaint   Patient presents with    Sent by MD     R/O blood clot to RLE     Pt to ER with her .  Pt had surgery on her R knee on 7/22 and is using a walker.  Pt was advised to come to the ER to r/o DVT to RLE.

## 2024-08-05 NOTE — DISCHARGE INSTRUCTIONS
Increase your Eliquis dose to 5 mg twice a day.  Please follow-up in 1 week for repeat ultrasound to determine whether the clot has increased in size or not.

## 2024-08-05 NOTE — ED PROVIDER NOTES
"ED Provider Note    CHIEF COMPLAINT  Chief Complaint   Patient presents with    Sent by MD     R/O blood clot to E     US-EXTREMITY VENOUS LOWER UNILAT RIGHT   Final Result            COURSE & MEDICAL DECISION MAKING    ASSESSMENT, COURSE AND PLAN  Care Narrative: Case signed out by Dr. Wilson.  Ultrasound results showed acute to subacute thrombosis in tibial vein.  This was discussed with the patient and her .  She was currently taking 2.5 mg of Eliquis twice a day as \"prophylaxis\".  This will be increased to 10 mg twice a day for acute DVT dosing for 1 week, then dropping down to 5 mg twice a day.  This was discussed with clinical pharmacist.  Review showed normal renal function on most recent labs.  Clot in right leg below the knee.  Plan for repeat ultrasound in 1 week to see if this is propagating.  I discussed IVC filter placement should she continue to fail oral anticoagulation for treatment of the DVT.  We discussed return precautions to the ER including chest pain, difficulty breathing or any other concern for PE.  Patient states she will contact her physician regarding scheduling the ultrasound next week, I have requested she return to the emergency department for repeat testing if she is unable to obtain outpatient testing in a timely fashion.        FINAL DIAGNOSIS  1. Acute deep vein thrombosis (DVT) of tibial vein of right lower extremity (HCC)         Electronically signed by: Didier Salas M.D., 8/5/2024 4:36 PM      "

## 2024-08-05 NOTE — ED NOTES
Pt reports she was put on Elaquis prior to surgery due to a history of DVT & PE after having a previous knee surgery.

## 2024-08-13 ENCOUNTER — HOSPITAL ENCOUNTER (OUTPATIENT)
Dept: RADIOLOGY | Facility: MEDICAL CENTER | Age: 74
End: 2024-08-13
Attending: FAMILY MEDICINE
Payer: MEDICARE

## 2024-08-13 DIAGNOSIS — I82.441 EMBOLISM AND THROMBOSIS OF RIGHT TIBIAL VEIN (HCC): ICD-10-CM

## 2024-08-13 PROCEDURE — 93971 EXTREMITY STUDY: CPT | Mod: RT

## 2024-08-14 PROCEDURE — 93971 EXTREMITY STUDY: CPT | Mod: 26,RT | Performed by: INTERNAL MEDICINE

## 2024-08-19 ENCOUNTER — HOSPITAL ENCOUNTER (OUTPATIENT)
Dept: LAB | Facility: MEDICAL CENTER | Age: 74
End: 2024-08-19
Attending: NURSE PRACTITIONER
Payer: MEDICARE

## 2024-08-19 DIAGNOSIS — E78.5 DYSLIPIDEMIA: ICD-10-CM

## 2024-08-19 LAB
CHOLEST SERPL-MCNC: 201 MG/DL (ref 100–199)
FASTING STATUS PATIENT QL REPORTED: NORMAL
HDLC SERPL-MCNC: 67 MG/DL
LDLC SERPL CALC-MCNC: 119 MG/DL
TRIGL SERPL-MCNC: 74 MG/DL (ref 0–149)

## 2024-08-19 PROCEDURE — 80061 LIPID PANEL: CPT

## 2024-08-22 ENCOUNTER — TELEPHONE (OUTPATIENT)
Dept: CARDIOLOGY | Facility: MEDICAL CENTER | Age: 74
End: 2024-08-22
Payer: MEDICARE

## 2024-08-22 DIAGNOSIS — I71.21 ASCENDING AORTIC ANEURYSM, UNSPECIFIED WHETHER RUPTURED (HCC): ICD-10-CM

## 2024-08-22 DIAGNOSIS — I27.20 PULMONARY HYPERTENSION (HCC): ICD-10-CM

## 2024-08-22 DIAGNOSIS — Z86.711 HISTORY OF PULMONARY EMBOLUS (PE): ICD-10-CM

## 2024-08-22 NOTE — TELEPHONE ENCOUNTER
Established patient  Chart prep for upcoming appointment.    Any pending/incomplete orders from last visit? No, all orders completed.  Was patient called and reminded to complete pending orders? N/A orders complete  Were any records requested?  No    Referral up to date? Yes renewal ordered, sent to provider to co-sign, AND new referral attached to upcoming appointment.    Referral attached to appointment (renewals and New patients only)? Yes renewal ordered and sent to provider to co-sign   Virtual appointment? No    Kavin Garduno Ass't  [facility]  Ph. [phone]  Fx. [phone]

## 2024-08-28 ENCOUNTER — OFFICE VISIT (OUTPATIENT)
Dept: VASCULAR LAB | Facility: MEDICAL CENTER | Age: 74
End: 2024-08-28
Payer: MEDICARE

## 2024-08-28 ENCOUNTER — TELEPHONE (OUTPATIENT)
Dept: VASCULAR LAB | Facility: MEDICAL CENTER | Age: 74
End: 2024-08-28

## 2024-08-28 ENCOUNTER — PHARMACY VISIT (OUTPATIENT)
Dept: PHARMACY | Facility: MEDICAL CENTER | Age: 74
End: 2024-08-28
Payer: COMMERCIAL

## 2024-08-28 VITALS
DIASTOLIC BLOOD PRESSURE: 78 MMHG | SYSTOLIC BLOOD PRESSURE: 131 MMHG | HEIGHT: 62 IN | WEIGHT: 173 LBS | BODY MASS INDEX: 31.83 KG/M2 | HEART RATE: 68 BPM

## 2024-08-28 DIAGNOSIS — Z86.711 HISTORY OF PULMONARY EMBOLUS (PE): ICD-10-CM

## 2024-08-28 DIAGNOSIS — Z79.01 ANTICOAGULATED: ICD-10-CM

## 2024-08-28 DIAGNOSIS — E78.5 DYSLIPIDEMIA: ICD-10-CM

## 2024-08-28 DIAGNOSIS — I82.441 ACUTE DEEP VEIN THROMBOSIS (DVT) OF TIBIAL VEIN OF RIGHT LOWER EXTREMITY (HCC): ICD-10-CM

## 2024-08-28 DIAGNOSIS — I71.21 ANEURYSM OF ASCENDING AORTA WITHOUT RUPTURE (HCC): ICD-10-CM

## 2024-08-28 DIAGNOSIS — I71.21 ASCENDING AORTIC ANEURYSM, UNSPECIFIED WHETHER RUPTURED (HCC): ICD-10-CM

## 2024-08-28 DIAGNOSIS — Z86.718 HISTORY OF DVT (DEEP VEIN THROMBOSIS): ICD-10-CM

## 2024-08-28 PROCEDURE — 99214 OFFICE O/P EST MOD 30 MIN: CPT

## 2024-08-28 PROCEDURE — RXMED WILLOW AMBULATORY MEDICATION CHARGE

## 2024-08-28 PROCEDURE — 3075F SYST BP GE 130 - 139MM HG: CPT

## 2024-08-28 PROCEDURE — 3078F DIAST BP <80 MM HG: CPT

## 2024-08-28 ASSESSMENT — ENCOUNTER SYMPTOMS
SHORTNESS OF BREATH: 1
BRUISES/BLEEDS EASILY: 0
DOUBLE VISION: 0
SPEECH CHANGE: 0
BLURRED VISION: 0
COUGH: 0
PALPITATIONS: 0
WHEEZING: 0
ORTHOPNEA: 0
MYALGIAS: 0
WEAKNESS: 0
FALLS: 0
HEADACHES: 0
DIZZINESS: 0
FOCAL WEAKNESS: 0
BLOOD IN STOOL: 0

## 2024-08-28 ASSESSMENT — FIBROSIS 4 INDEX: FIB4 SCORE: 1.22

## 2024-08-28 NOTE — PROGRESS NOTES
Follow- up VASCULAR ANTICOAGULATION VISIT    Regina Cornell is a 74 yo female who presents 08/28/2024 for   Chief Complaint   Patient presents with    Follow-Up     Initially referred by Sia Davis MD for length of therapy (LOT) determination and management of anticoagulation in context of acute venothromboembolic disease    Subjective     Had right TKR on 7/22/24, and started Eliquis 2.5 BID x12 days,  described good adherence, and did not miss any doses.  saw ortho on 8/5 for post op check and was sent for US due to concern for DVT.  Duplex + DVT in RLE.  And was Started on Eliquis 10mg BID for 7 days, is now on 5mg BID and tolerating.  Is affordable, no missed doses, no bleeding  Still doing PT, mobility is still impaired, using cane  No CP, SOB, palpitations,  + RLE leg swelling, unchanged from postop   Did fasting labs - reviewed    Thoracic Aortic Aneurysm  Type: ascending   Current/interval concerns: none  Current sx: denies chest, back, abdominal pain, dysphagia, worsening cough, hemoptysis.    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  Type of Venous thromboembolic disease (VTE): acute LLE DVT and PE 2019, RLE DVT 2024   Type of imaging: duplex, CTPA - repeated 5/2020  Preceding/presenting symptoms: 2019- LLE swelling during post-op period after knee surgery - approximately 2 weeks postop had initial duplex, started on eliquis and, despite compliant therapy, then developed acute SOB, CP and found to have PE, switched to VKA as deemed eliquis failure   Antithrombotic therapy at time  of VTE event: no  VTE tx course: initiated on Eliquis but then noted to have new onset SOB with CTPA showing possible new PE, transitioned to VKA since   Any personal VTE hx? No  Any family VTE hx? No   UNPROVOKED VS PROVOKED:   Recent surgery ? Yes, Details: 10/16/20 - L knee athroscopy with Dr. Booth  Smoker?  reports that she quit smoking about 47 years ago. Her smoking use included cigarettes. She started smoking about 60 years ago. She has a 13 pack-year smoking history. She has never used smokeless tobacco.    Other periods of immobility? Yes, Details: post-op     Hyperlipidemia:   Remains off atorva due to cramping  Tolerating addition of zetia      Current Outpatient Medications:     Bempedoic Acid-Ezetimibe, 1 Capsule, Oral, DAILY    fluticasone, 1 Spray, Nasal, QDAY PRN    apixaban, Take 10 mg twice a day for 1 week, then switch to 5 mg twice a day for treatment of acute DVT    bisacodyl EC, Take 2 tablets every day by oral route for 2 days. Stop if you develop loose stools or diarrhea (Patient taking differently: 5 mg, Oral, 1 TIME DAILY PRN, Constipation)    oxyCODONE immediate-release, Take 2 tablets every 4-6 hours by oral route for 7 days. (Patient taking differently: 0.5-1 Tablet, Oral, EVERY 6 HOURS PRN, Severe Pain)    acetaminophen, Take 2 tablets every 8 hours by oral route for 7 days. (Patient taking differently: 1,300 mg, Oral, 3 TIMES DAILY)    ondansetron, Place 1 tablet twice a day by translingual route, for prn nausea. (Patient taking differently: 8 mg, Oral, EVERY 12 HOURS PRN, Nausea)    meloxicam, 15 mg, Oral, DAILY    Vitamin B Complex, 1 Capsule, Oral, DAILY    Plant Sterols and Stanols (CHOLEST OFF PO), 1 Capsule, Oral, DAILY    ezetimibe, 10 mg, Oral, DAILY (Patient taking differently: 10 mg, Oral, DAILY,   )    Diclofenac Sodium, 1 Application , Apply externally, 4X/DAY (Patient taking differently: 1 Application , Apply externally, DAILY, Apply's on both knees )     Social History  "    Tobacco Use    Smoking status: Former     Current packs/day: 0.00     Average packs/day: 1 pack/day for 13.0 years (13.0 ttl pk-yrs)     Types: Cigarettes     Start date: 1964     Quit date:      Years since quittin.6    Smokeless tobacco: Never   Vaping Use    Vaping status: Never Used   Substance Use Topics    Alcohol use: Yes     Alcohol/week: 4.2 - 8.4 oz     Types: 7 - 14 Standard drinks or equivalent per week     Comment: nightly 1-2    Drug use: No     Comment: tried marijuana     DIET AND EXERCISE:  Weight Change: down a few from last ~5lbs  BMI Readings from Last 5 Encounters:   24 31.64 kg/m²   24 31.33 kg/m²   24 31.96 kg/m²   24 32.74 kg/m²   24 32.74 kg/m²     Diet: common adult  Exercise: minimal exercise Limited by ortho pains/post op restrictions    Review of Systems   Constitutional:  Negative for malaise/fatigue.   HENT:  Negative for nosebleeds.    Eyes:  Negative for blurred vision and double vision.   Respiratory:  Positive for shortness of breath (chronic). Negative for cough and wheezing.    Cardiovascular:  Positive for leg swelling (recent right TKR). Negative for chest pain, palpitations and orthopnea.   Gastrointestinal:  Negative for blood in stool and melena.   Genitourinary:  Negative for hematuria.   Musculoskeletal:  Positive for joint pain (recent right TKR). Negative for falls and myalgias.   Neurological:  Negative for dizziness, speech change, focal weakness, weakness and headaches.   Endo/Heme/Allergies:  Does not bruise/bleed easily.        Objective     Vitals:    24 0952 24 0955   BP: 135/74 131/78   Pulse: 66 68   Weight: 78.5 kg (173 lb)    Height: 1.575 m (5' 2\")         BP Readings from Last 5 Encounters:   24 131/78   24 123/75   24 106/54   24 (!) 149/85   10/13/23 (!) 146/78      Body mass index is 31.64 kg/m².     Physical Exam  Vitals reviewed.   Constitutional:       General: She is " not in acute distress.     Appearance: Normal appearance.   HENT:      Head: Normocephalic and atraumatic.   Cardiovascular:      Rate and Rhythm: Regular rhythm. Bradycardia present.      Heart sounds: Normal heart sounds.   Pulmonary:      Effort: Pulmonary effort is normal. No respiratory distress.      Breath sounds: Normal breath sounds. No wheezing or rales.   Musculoskeletal:         General: Swelling (recent right TKR) present. Normal range of motion.      Left lower leg: No edema.   Skin:     General: Skin is warm and dry.      Coloration: Skin is not pale.   Neurological:      General: No focal deficit present.      Mental Status: She is alert and oriented to person, place, and time.      Coordination: Coordination normal.      Gait: Gait normal.      Comments: Using cane   Psychiatric:         Mood and Affect: Mood normal.         Behavior: Behavior normal.         Thought Content: Thought content normal.       Lab Results   Component Value Date/Time    CHOLSTRLTOT 201 (H) 08/19/2024 08:40 AM     (H) 08/19/2024 08:40 AM    HDL 67 08/19/2024 08:40 AM    TRIGLYCERIDE 74 08/19/2024 08:40 AM       Lab Results   Component Value Date/Time    SODIUM 144 07/08/2024 01:25 PM    POTASSIUM 3.2 (L) 07/08/2024 01:25 PM    CHLORIDE 110 07/08/2024 01:25 PM    CO2 19 (L) 07/08/2024 01:25 PM    GLUCOSE 127 (H) 07/08/2024 01:25 PM    BUN 20 07/08/2024 01:25 PM    CREATININE 0.54 07/08/2024 01:25 PM    CREATININE 0.9 10/05/2007 12:50 PM     Lab Results   Component Value Date/Time    ALKPHOSPHAT 85 07/08/2024 01:25 PM    ASTSGOT 22 07/08/2024 01:25 PM    ALTSGPT 30 07/08/2024 01:25 PM    TBILIRUBIN 0.4 07/08/2024 01:25 PM       Lab Results   Component Value Date    CHOLSTRLTOT 201 (H) 08/19/2024     (H) 08/19/2024    HDL 67 08/19/2024    TRIGLYCERIDE 74 08/19/2024         Lab Results   Component Value Date    HBA1C 5.5 10/05/2023      Lab Results   Component Value Date    SODIUM 144 07/08/2024    POTASSIUM 3.2  (L) 2024    CHLORIDE 110 2024    CO2 19 (L) 2024    GLUCOSE 127 (H) 2024    BUN 20 2024    CREATININE 0.54 2024        Lab Results   Component Value Date    WBC 6.8 2024    RBC 4.78 2024    HEMOGLOBIN 14.7 2024    HEMATOCRIT 43.9 2024    MCV 91.8 2024    MCH 30.8 2024    MCHC 33.5 2024    MPV 10.5 2024     VASCULAR IMAGING:     Last EKG:   Results for orders placed or performed in visit on 24   ECG   Result Value Ref Range    Report       [facility]    Test Date:  2024  Pt Name:    MICHELET BARNES                  Department: [facility]  MRN:        [id number]                      Room:  Gender:     Female                       Technician:   :        [id number]                   Requested By:RON MULLINS  Order #:    [id number]                    Reading MD: Paul Mills MD    Measurements  Intervals                                Axis  Rate:       67                           P:          54  IL:         162                          QRS:        2  QRSD:       76                           T:          19  QT:         428  QTc:        451    Interpretive Statements  Sinus rhythm  ST depression, anterolateral leads  Compared to ECG 2019 17:10:41  ST (T wave) deviation now present    Electronically Signed On 2024 23:27:46 PDT by Paul Mills MD       Echo 2017  No prior study is available for comparison.   Normal left ventricular systolic function.  Normal left ventricular wall thickness.  Mild aortic stenosis.  Mild tricuspid regurgitation.  Estimated right ventricular systolic pressure is 45 mmHg.  No prior study is available for comparison.   Aorta  Ascending aorta is dilated with a diameter of 4.1 cm.    LLE venous 2019    Venous duplex imaging was performed in only the left lower extremity    including serial compressions and spectral Doppler flow evaluation.   Partially compressible left  femoral vein mid & distal with subacute    appearing softly echogenic material (thrombus) in the lumen. Incompressible    left popliteal and gastrocnemius veins with subacute appearing mixed    echogenic material (thrombus) in the lumen.    Partially compressible left peroneal vein with subacute appearing thrombus    in the lumen.    Echo 11/6/19  Prior Echo - 5/19/17.  Normal left ventricular systolic function.   No evidence of valvular abnormality based on Doppler evaluation.   Compared to the images of the prior study done -  there has been no   significant change.   Ascending aorta is dilated with a diameter of 4.0 cm.    CTPA 11/5/19  1.  Isolated pulmonary embolus is identified in superior segment right lower pulmonary lobe.   2.  RV LV ratio is elevated.   3.  There are 2 right lung pulmonary nodules. Largest measures 9 mm in diameter. Recommend follow-up CT in 3-6 months.    LLE venous 5/22/20    Chronic, partial thrombus with flow recanalization in the LEFT popliteal    vein. Chronic, occlusive thrombus in the LEFT peroneal trunk vein.     Compared to the images of the duplex dated 11/6/19 - there has been an    evolution of the DVT.    CXR 2/2021   No acute cardiopulmonary findings.    VQ scan 2/2021    Low probability of pulmonary embolism.    Echo 2/2021   Compared to the images of the prior study done 11/6/2019 - the estimate   of right ventricular systolic pressure is increased, previously 25   mmHg.  Normal left ventricular systolic function.  Left ventricular ejection fraction is visually estimated to be 65%.  Normal right ventricular size and systolic function.  Mild mitral regurgitation.  Mild aortic insufficiency.  Mild tricuspid regurgitation.  Estimated right ventricular systolic pressure is 40 mmHg.  Ascending aorta is borderline dilated with a diameter of 4.0 cm.    Echo 3/2022   Aorta  Normal aortic root for body surface area. The ascending aorta is   dilated with a diameter of 4-4.2 cm.  The  arch is normal size.  The left ventricular ejection fraction is visually estimated to be 65-  70%.  Aortic valve sclerosis without significant stenosis.  Estimated right ventricular systolic pressure is 32-35 mmHg.  The ascending aorta is dilated with a diameter of 4-4.2 cm, difficult   to see borders in some views, difficult to see borders in some views.   Compared to the prior echo on 02/10/21, probably no significant change.    MRA Chest 1/23/23     1.  MRA of the thoracic aorta demonstrates fusiform aneurysm of the ascending thoracic aorta currently measuring 4.1 x 4.0 cm.     2.  No evidence of aortic dissection.     3.  No other aneurysm identified.     4.  Branch vessels of the aorta appear patent.     Echo 5/6/2024  Prior study on 3/24/22, compared to the report of the prior study,   there has been no significant change.   Normal left ventricular systolic function.  The left ventricular ejection fraction is visually estimated to be 55%.  Mild mitral regurgitation.  Mild aortic insufficiency.  Mild tricuspid regurgitation.  Estimated right ventricular systolic pressure is 35 mmHg.  The ascending aorta diameter is 4.1  cm.    RLE venous duplex 8/5/2024  Acute to subacute, occlusive deep venous thrombus in the one of the right    middle leg posterior tibial veins.    RLE venous duplex 8/13/2024  Right lower extremity venous duplex imaging.    Acute to subacute, occlusive thrombus involving one of the paired posterior    tibial veins in the mid calf.   No change since exam dated 8/5/24.       Medical Decision Making:  Today's Assessment / Status / Plan:     1. Dyslipidemia  Bempedoic Acid-Ezetimibe 180-10 MG Tab    Lipid Profile    Lipoprotein (a)    APOLIPOPROTEIN B    DISCONTINUED: Bempedoic Acid-Ezetimibe 180-10 MG Tab      2. Acute deep vein thrombosis (DVT) of tibial vein of right lower extremity (HCC)  FACTOR II DNA ANALYSIS    FACTOR V LEIDEN MUTATION    ANTICARDIOLIPIN AB IGG,IGM,IGA    BETA -2  GLYCOPROTEIN I AB JANET      3. History of pulmonary embolus (PE)        4. History of DVT (deep vein thrombosis)        5. Aneurysm of ascending aorta without rupture (HCC)        6. Ascending aortic aneurysm, unspecified whether ruptured (HCC)        7. 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   Echo shows no biscupid Ao valve  No fhx of pmhx of connective tissue disease  - reviewed anatomy, risks, emergency s/s requiring immediate attention and gave handouts.   -  Echo in 2 years 5/2026  - focus on ARB and BB for BP control if HTN developed   - activity restrictions including no extreme endurance activities, smoking, stimulants, and extreme weight lifting >20lbs     2) VTE disease - new DVT 8/2024, provoked by ortho surgery 2 weeks prior.   -acute LLE DVT, 11/2019  - provoked by ortho surgery  - acute PE, 11/2019  - NEW Acute DVT RLE paired posterior tibial veins seemingly provoked by TKR 2 weeks prior 8/2024.    Negative   - see OAC plan, no further imaging     ANTITHROMBOTIC THERAPY:  Anti-Platelet/Anti-Coagulant Tx recommended: yes  Indication: hx of surg-associated DVT/PE - 11/2019, now a recurrent surg-associated DVT/PE 8/2024  Date of initiation: 11/2019 - completed VKA 2/2019, restarted Eliquis 8/2024   HAS-BLED bleeding risk calc ([email or web address]): 1 pt, 3.4%, low risk  Thrombophilia/hypercoag evaluation:  given second VTE event after surgery, will check hypercoag panel to rule out underlying thrombophilia - partial panel ordered today - pending   Factors to consider for indefinite OAC: None ,  Last CBC, BMP: as reviewed above  Expected duration: Expected duration: reviewed standard of care as per Chest 2021 guidelines is 3-6 months  "minimum on full dose OAC.  Reviewed with pt we could consider indefinite therapy given recurrent VTE, although each episode was was provoked after surgery.  Hypercoag panel not completed previously, will obtain at least partial hypercoag panel while on DOAC.  Course complicated with possible question previous \"DOAC failure\" after initial DVT and subsequent PE development after 2 weeks on DOAC, pt subsequently switched to VKA for remainder of treatment.  Current VTE developed while on prophylaxis dose of Eliquis 2.5mg with reported complete adherence.  Would not consider Eliquis as was only on therapeutic dose and will precede with full dose Eliquis for 3-6 months.   After review of risks/benefits/alternatives, through shared decision-making, we will continue with current full dose Eliquis for at least 3 months (through 11/2024) then discuss ongoing LOT, vs extended therapy/transition to ASA.    Antithrombotic therapy plan:  - continue Eliquis 5mg BID through at least 11/2024 (for 3 months of therapy), then discuss ongoing LOT (extended vs ASA)  - HOLD ASA 162mg for now while on DOAC, but consider restart indefinitely if/when decide to transition off DOAC in future  - complete partial hypercoag panel  - continue knee-high compression socks, 20-30mmHg, as much as tolerated    - increase walking, avoid prolonged standing   - elevated legs while sitting and sleeping above heart level  - reduce sodium (\"salt\") in diet to less than 2,000mg daily   - counseled on signs and symptoms of acute VTE that require seeking prompt attention to include shortness of breath, chest pain, pain with deep inhalation, acute leg swelling and/or pain in calf or leg   - elevate legs as much as possible, use compression stockings/socks if directed by your provider  - Avoid hormonal therapies including estrogen or testosterone-containing meds, or raloxifene or tamoxifene (commonly used for osteoporosis)  - Avoid sedentary periods  - continue " "complete avoidance of tobacco products  - if having any invasive procedure,please make sure the doctor knows of your history of blood clots and current anticoagulation status.  Would recommend full dose OAC postop for any future ortho surgeries.      LIPID MANAGEMENT:   Qualifies for Statin Therapy Based on 2018 ACC/AHA Guidelines: yes, Primary Prevention - 40-74yo, LDLc >70, <190 w/o DM  The 10-year ASCVD risk score (Miya DK, et al., 2019) is: 15%  7.5 - <20% \"intermediate risk\" .   Major ASCVD events: None   High-risk conditions: N/A  Risk-enhancers: None  Currently on Statin:  yes  - worsening LDL-C over time from 127 to 158 previously,   Statin therapy can reduce risk of recurrent VTE if off OAC  Had leg/groin pains on rosuvastatin; tolerates atorva at present   Treatment goals: LDL-C <100 (consider non-HDL-C <130, apoB <90)   At goal? No, 8/2024 on zetia monotherapy.    Previously at goal 5/2024 (while on Lipitor)  Tolerating addition of zetia  Prefers to avoid restarting statins given previous lifestyle limiting myalgias, has previously failed atorvastatin and rosuvastatin despite low dose and alternating dose regimen.  Is hesitant to trial different statin.  Would consider mostly statin intolerant.    Discussed alternatives and through shared decision making, we have elected to trial adding on an additional non-statin option of Nexletol (Nexlizet combo if affordable) for an additional 20% reduction to achieve her LDL goal of <100.    Plan:   - intensify TLC measures as noted - given LDL hand out previously  - consider trial of pitivastatin in future, but would consider mostly statin intolerant   - continue vitamin D 5,000 units daily  - continue zetia 10mg daily, stop if able to start Nexlizet  - START Nexletol (Nexlizet if covered by insurance)  will send samples to pharmacy for pt while waiting for PA approval. Msg sent to pharmacy coordinator to assist with PA/FA  - increase hydration 60-80 oz per day "   - check lipid panel in 3 months     BLOOD PRESSURE MANAGEMENT:  ACC/AHA (2017) goal <130/80  Home BP at goal: yes, 120-126/70s, occasional 130/70 on log   Office BP at goal:  No, mild high SBP   Indications of end organ damage: dilated Ascending Ao, LVH per EKG   Indicated medications for reduction of expansion rate of TAA:   -Beta-blocker (decreases pulsatile wall stress)  -Losartan (inhibits tissue growth factor beta, decreasing aneurysmal growth rate or rate of expansion)  Plan:   Monitoring:   - continue home BP monitoring; call if consistently >135/85- discussed and demonstrated correct technique at today's visit. Gave home log; instructed to bring to f/u appt  Medications:  Consider losartan as 1st line     GLYCEMIC STATUS:  Prediabetic  A1c improved on most recent labs; down to 5.5 (was 6.0) at highest  -continue to focus on TLC; limit simple carbs/sugars, increase fiber, increase exercise    LIFESTYLE RECOMMENDATIONS:     Smoking:  reports that she quit smoking about 47 years ago. Her smoking use included cigarettes. She started smoking about 60 years ago. She has a 13 pack-year smoking history. She has never used smokeless tobacco.   - continued complete avoidance of all tobacco products     Physical Activity: encourage healthy activity to 30 min most days of the week; avoid heavy lifting    Other:  Bradycardia- No HR lowering meds.  denies lightheaded/dizziness, fatigue.  No symptoms with exercise or posture change.  Continue to monitor    Instructed to follow-up with PCP for remainder of adult medical needs: yes  We will partner with other providers in the management of established vascular disease and cardiometabolic risk factors.    Studies to Be Obtained: echo 5/2026  Labs to Be Obtained: lipid, apob, Lp(a), factor II, factor V, beta 2, anticardiolipin, cmp, cbc    Follow up in: 3 months for lab review and LOT with MD (needs appt)    JAVI Peralta.  [facility]   [facility]   [phone]

## 2024-08-28 NOTE — TELEPHONE ENCOUNTER
Received New start PA request via  EMR   for NEXLIZET 180-10MG TABLETS . (Quantity:90, Day Supply:90)     Insurance: [employer] Rx D   Member ID:  [id number]  BIN: [id number]  PCN: [id number]  Group: [id number]     Ran Test claim via [place] & medication Rejects stating prior authorization is required.    MARILIA Washburn, PhT  Vascular Pharmacy Liaison (Rx Coordinator)  P: [phone]  8/28/2024 12:23 PM

## 2024-08-29 NOTE — TELEPHONE ENCOUNTER
PA for NEXLIZET 180-10MG TABLETS  has been denied for a quantity of 90 , day supply 90    Prior authorization was denied per the following: (1) You need to try two (2) of these covered drugs:  (a) Lovastatin.  (b) Pravastatin.  (c) Simvastatin.  (2) OR your doctor needs to give us specific medical reasons why two (2) of the covered drug(s) are  not appropriate for you.    PA denial reference number: [id number]    Insurance: [employer] Rx D       Next Steps: notified and updated provider and clinic staff for the denial status. Will await for the provider's response to either proceed with an appeal or not.     MARILIA Washburn, PhT  Vascular Pharmacy Liaison (Rx Coordinator)  P: [phone]  8/29/2024 2:08 PM

## 2024-08-29 NOTE — TELEPHONE ENCOUNTER
Submitted PA for Nexlizet through CM.  MICHELET BARNES (Bowser: [id number])  PA Case ID #: [id number]      Pending response.    Catrachita Chilel, Med Ass't  [facility]  Ph. [phone]  Fx. [phone]

## 2024-09-12 ENCOUNTER — TELEPHONE (OUTPATIENT)
Dept: CARDIOLOGY | Facility: MEDICAL CENTER | Age: 74
End: 2024-09-12
Payer: MEDICARE

## 2024-09-12 ENCOUNTER — PATIENT MESSAGE (OUTPATIENT)
Dept: VASCULAR LAB | Facility: MEDICAL CENTER | Age: 74
End: 2024-09-12
Payer: MEDICARE

## 2024-09-12 ENCOUNTER — PHARMACY VISIT (OUTPATIENT)
Dept: PHARMACY | Facility: MEDICAL CENTER | Age: 74
End: 2024-09-12
Payer: COMMERCIAL

## 2024-09-12 DIAGNOSIS — E78.5 DYSLIPIDEMIA: ICD-10-CM

## 2024-09-12 DIAGNOSIS — E78.5 DYSLIPIDEMIA: Primary | ICD-10-CM

## 2024-09-12 PROCEDURE — RXMED WILLOW AMBULATORY MEDICATION CHARGE

## 2024-09-12 RX ORDER — BEMPEDOIC ACID 180 MG/1
180 TABLET, FILM COATED ORAL DAILY
Qty: 28 TABLET | Refills: 0 | Status: SHIPPED | OUTPATIENT
Start: 2024-09-12

## 2024-09-12 NOTE — TELEPHONE ENCOUNTER
Received Refill Erx request via [facility]  for Nexlizet 180-10mg tabs . (Quantity: 100 , Day Supply: 100 )     Insurance: [employer]      Ran Test claim via [employer] & insurance rejected for PA REQUIRED.

## 2024-09-12 NOTE — PROGRESS NOTES
Mackenzie Wall, T  P Vanda Gilesag Pool  Cc: JAVI Peralta.  Good morning, everyone!    I am asking Tosin to initiate the appeal for this pt's nexletol. However, pt just sent out a message that tomorrow's her last day of her pill for nexletol. Is there any way we can send a new script for Nexletol samples to be dispense at [place]? I just got a confirmation from the pharmacy that they have the samples in stock. Please advise.    Mackenzie Wall  Vascular Pharmacy Liaison (Rx Coordinator)

## 2024-12-05 ENCOUNTER — HOSPITAL ENCOUNTER (OUTPATIENT)
Dept: LAB | Facility: MEDICAL CENTER | Age: 74
End: 2024-12-05
Attending: FAMILY MEDICINE
Payer: MEDICARE

## 2024-12-05 ENCOUNTER — HOSPITAL ENCOUNTER (OUTPATIENT)
Dept: LAB | Facility: MEDICAL CENTER | Age: 74
End: 2024-12-05
Payer: MEDICARE

## 2024-12-05 DIAGNOSIS — E78.5 DYSLIPIDEMIA: ICD-10-CM

## 2024-12-05 DIAGNOSIS — I82.441 ACUTE DEEP VEIN THROMBOSIS (DVT) OF TIBIAL VEIN OF RIGHT LOWER EXTREMITY (HCC): ICD-10-CM

## 2024-12-05 DIAGNOSIS — Z79.01 ANTICOAGULATED: ICD-10-CM

## 2024-12-05 LAB
BASOPHILS # BLD AUTO: 0.5 % (ref 0–1.8)
BASOPHILS # BLD: 0.03 K/UL (ref 0–0.12)
EOSINOPHIL # BLD AUTO: 0.13 K/UL (ref 0–0.51)
EOSINOPHIL NFR BLD: 2.4 % (ref 0–6.9)
ERYTHROCYTE [DISTWIDTH] IN BLOOD BY AUTOMATED COUNT: 47.4 FL (ref 35.9–50)
HCT VFR BLD AUTO: 45.7 % (ref 37–47)
HGB BLD-MCNC: 14.8 G/DL (ref 12–16)
IMM GRANULOCYTES # BLD AUTO: 0.02 K/UL (ref 0–0.11)
IMM GRANULOCYTES NFR BLD AUTO: 0.4 % (ref 0–0.9)
LYMPHOCYTES # BLD AUTO: 2.06 K/UL (ref 1–4.8)
LYMPHOCYTES NFR BLD: 37.5 % (ref 22–41)
MCH RBC QN AUTO: 28.8 PG (ref 27–33)
MCHC RBC AUTO-ENTMCNC: 32.4 G/DL (ref 32.2–35.5)
MCV RBC AUTO: 89.1 FL (ref 81.4–97.8)
MONOCYTES # BLD AUTO: 0.45 K/UL (ref 0–0.85)
MONOCYTES NFR BLD AUTO: 8.2 % (ref 0–13.4)
NEUTROPHILS # BLD AUTO: 2.81 K/UL (ref 1.82–7.42)
NEUTROPHILS NFR BLD: 51 % (ref 44–72)
NRBC # BLD AUTO: 0 K/UL
NRBC BLD-RTO: 0 /100 WBC (ref 0–0.2)
PLATELET # BLD AUTO: 250 K/UL (ref 164–446)
PMV BLD AUTO: 10.3 FL (ref 9–12.9)
RBC # BLD AUTO: 5.13 M/UL (ref 4.2–5.4)
WBC # BLD AUTO: 5.5 K/UL (ref 4.8–10.8)

## 2024-12-05 PROCEDURE — 82172 ASSAY OF APOLIPOPROTEIN: CPT

## 2024-12-05 PROCEDURE — 83695 ASSAY OF LIPOPROTEIN(A): CPT

## 2024-12-05 PROCEDURE — 83036 HEMOGLOBIN GLYCOSYLATED A1C: CPT | Mod: GA

## 2024-12-05 PROCEDURE — 80053 COMPREHEN METABOLIC PANEL: CPT | Mod: 91

## 2024-12-05 PROCEDURE — 86146 BETA-2 GLYCOPROTEIN ANTIBODY: CPT | Mod: 91

## 2024-12-05 PROCEDURE — 81240 F2 GENE: CPT

## 2024-12-05 PROCEDURE — 86147 CARDIOLIPIN ANTIBODY EA IG: CPT | Mod: 91

## 2024-12-05 PROCEDURE — 82306 VITAMIN D 25 HYDROXY: CPT

## 2024-12-05 PROCEDURE — 80053 COMPREHEN METABOLIC PANEL: CPT

## 2024-12-05 PROCEDURE — 80061 LIPID PANEL: CPT

## 2024-12-05 PROCEDURE — 36415 COLL VENOUS BLD VENIPUNCTURE: CPT

## 2024-12-05 PROCEDURE — 81241 F5 GENE: CPT

## 2024-12-05 PROCEDURE — 85025 COMPLETE CBC W/AUTO DIFF WBC: CPT

## 2024-12-06 LAB
25(OH)D3 SERPL-MCNC: 20 NG/ML (ref 30–100)
ALBUMIN SERPL BCP-MCNC: 4.5 G/DL (ref 3.2–4.9)
ALBUMIN SERPL BCP-MCNC: 4.6 G/DL (ref 3.2–4.9)
ALBUMIN/GLOB SERPL: 1.7 G/DL
ALBUMIN/GLOB SERPL: 1.9 G/DL
ALP SERPL-CCNC: 76 U/L (ref 30–99)
ALP SERPL-CCNC: 76 U/L (ref 30–99)
ALT SERPL-CCNC: 18 U/L (ref 2–50)
ALT SERPL-CCNC: 18 U/L (ref 2–50)
ANION GAP SERPL CALC-SCNC: 10 MMOL/L (ref 7–16)
ANION GAP SERPL CALC-SCNC: 9 MMOL/L (ref 7–16)
AST SERPL-CCNC: 23 U/L (ref 12–45)
AST SERPL-CCNC: 23 U/L (ref 12–45)
BILIRUB SERPL-MCNC: 0.6 MG/DL (ref 0.1–1.5)
BILIRUB SERPL-MCNC: 0.6 MG/DL (ref 0.1–1.5)
BUN SERPL-MCNC: 18 MG/DL (ref 8–22)
BUN SERPL-MCNC: 18 MG/DL (ref 8–22)
CALCIUM ALBUM COR SERPL-MCNC: 8.9 MG/DL (ref 8.5–10.5)
CALCIUM ALBUM COR SERPL-MCNC: 8.9 MG/DL (ref 8.5–10.5)
CALCIUM SERPL-MCNC: 9.3 MG/DL (ref 8.5–10.5)
CALCIUM SERPL-MCNC: 9.4 MG/DL (ref 8.5–10.5)
CHLORIDE SERPL-SCNC: 104 MMOL/L (ref 96–112)
CHLORIDE SERPL-SCNC: 106 MMOL/L (ref 96–112)
CHOLEST SERPL-MCNC: 167 MG/DL (ref 100–199)
CO2 SERPL-SCNC: 25 MMOL/L (ref 20–33)
CO2 SERPL-SCNC: 26 MMOL/L (ref 20–33)
CREAT SERPL-MCNC: 0.61 MG/DL (ref 0.5–1.4)
CREAT SERPL-MCNC: 0.65 MG/DL (ref 0.5–1.4)
FASTING STATUS PATIENT QL REPORTED: NORMAL
FASTING STATUS PATIENT QL REPORTED: NORMAL
GFR SERPLBLD CREATININE-BSD FMLA CKD-EPI: 92 ML/MIN/1.73 M 2
GFR SERPLBLD CREATININE-BSD FMLA CKD-EPI: 93 ML/MIN/1.73 M 2
GLOBULIN SER CALC-MCNC: 2.4 G/DL (ref 1.9–3.5)
GLOBULIN SER CALC-MCNC: 2.6 G/DL (ref 1.9–3.5)
GLUCOSE SERPL-MCNC: 95 MG/DL (ref 65–99)
GLUCOSE SERPL-MCNC: 97 MG/DL (ref 65–99)
HDLC SERPL-MCNC: 80 MG/DL
LDLC SERPL CALC-MCNC: 76 MG/DL
POTASSIUM SERPL-SCNC: 4.9 MMOL/L (ref 3.6–5.5)
POTASSIUM SERPL-SCNC: 4.9 MMOL/L (ref 3.6–5.5)
PROT SERPL-MCNC: 7 G/DL (ref 6–8.2)
PROT SERPL-MCNC: 7.1 G/DL (ref 6–8.2)
SODIUM SERPL-SCNC: 139 MMOL/L (ref 135–145)
SODIUM SERPL-SCNC: 141 MMOL/L (ref 135–145)
TRIGL SERPL-MCNC: 55 MG/DL (ref 0–149)

## 2024-12-07 LAB
APO B100 SERPL-MCNC: 69 MG/DL (ref 60–117)
B2 GLYCOPROT1 IGA SER-ACNC: <10 SAU
B2 GLYCOPROT1 IGG SERPL IA-ACNC: <10 SGU
B2 GLYCOPROT1 IGM SERPL IA-ACNC: <10 SMU
LPA SERPL-MCNC: 25 MG/DL

## 2024-12-08 LAB
CARDIOLIPIN IGA SER IA-ACNC: <10 APL
CARDIOLIPIN IGG SER IA-ACNC: <10 GPL
CARDIOLIPIN IGM SER IA-ACNC: <10 MPL

## 2024-12-09 LAB
EST. AVERAGE GLUCOSE BLD GHB EST-MCNC: 131 MG/DL
F2 C.20210G>A GENO BLD/T: NEGATIVE
HBA1C MFR BLD: 6.2 % (ref 4–5.6)

## 2024-12-10 ENCOUNTER — OFFICE VISIT (OUTPATIENT)
Dept: VASCULAR LAB | Facility: MEDICAL CENTER | Age: 74
End: 2024-12-10
Attending: FAMILY MEDICINE
Payer: MEDICARE

## 2024-12-10 VITALS
HEIGHT: 62 IN | SYSTOLIC BLOOD PRESSURE: 135 MMHG | HEART RATE: 54 BPM | BODY MASS INDEX: 30.91 KG/M2 | WEIGHT: 168 LBS | DIASTOLIC BLOOD PRESSURE: 83 MMHG

## 2024-12-10 DIAGNOSIS — Z79.02 ANTIPLATELET OR ANTITHROMBOTIC LONG-TERM USE: Chronic | ICD-10-CM

## 2024-12-10 DIAGNOSIS — I77.810 ASCENDING AORTA DILATION (HCC): Chronic | ICD-10-CM

## 2024-12-10 DIAGNOSIS — R73.03 PREDIABETES: Chronic | ICD-10-CM

## 2024-12-10 DIAGNOSIS — I82.409 RECURRENT DEEP VEIN THROMBOSIS (DVT) OF LOWER EXTREMITY, UNSPECIFIED LATERALITY (HCC): Chronic | ICD-10-CM

## 2024-12-10 DIAGNOSIS — Z86.711 HISTORY OF PULMONARY EMBOLUS (PE): Chronic | ICD-10-CM

## 2024-12-10 PROCEDURE — 3079F DIAST BP 80-89 MM HG: CPT | Performed by: FAMILY MEDICINE

## 2024-12-10 PROCEDURE — 3075F SYST BP GE 130 - 139MM HG: CPT | Performed by: FAMILY MEDICINE

## 2024-12-10 PROCEDURE — G2211 COMPLEX E/M VISIT ADD ON: HCPCS | Performed by: FAMILY MEDICINE

## 2024-12-10 PROCEDURE — 99212 OFFICE O/P EST SF 10 MIN: CPT

## 2024-12-10 PROCEDURE — 99214 OFFICE O/P EST MOD 30 MIN: CPT | Performed by: FAMILY MEDICINE

## 2024-12-10 RX ORDER — ASPIRIN 81 MG/1
81 TABLET ORAL DAILY
COMMUNITY
Start: 2024-12-10

## 2024-12-10 ASSESSMENT — ENCOUNTER SYMPTOMS
BLOOD IN STOOL: 0
PND: 0
COUGH: 0
ORTHOPNEA: 0
HEMOPTYSIS: 0
WHEEZING: 0
CLAUDICATION: 0
PALPITATIONS: 0
FEVER: 0
CHILLS: 0
SPUTUM PRODUCTION: 0
BRUISES/BLEEDS EASILY: 0
SHORTNESS OF BREATH: 0

## 2024-12-10 ASSESSMENT — FIBROSIS 4 INDEX: FIB4 SCORE: 1.626345596729059306

## 2024-12-10 NOTE — PROGRESS NOTES
FOLLOW-UP VASCULAR MED ANTICOAGULATION CLINIC  12/10/24     Regina Cornell, female who presents FOR F/U  Initially referred by Sia Davis MD  for VTE/LOT mgmt, est 3/2023     Subjective     Interval hx/concerns: last seen 2024 with APRN due to new postop from TKA RLE DVT, reports     Ascending Aortic Aneurysm: denies chest, back, abdominal pain, dysphagia, worsening cough, hemoptysis.    VTE disease / Anticoagulation:   Current agent:  Eliquis 5mg BID - tolerating, no bleeding, good adherence   Date of initiation: 2019, cessation 2021, restarted 2024  Pertinent VTE pmhx (for 2024 event) :   Date of Diagnosis: 2024  Type of VTE: RLE DVT   Initial hx/sx:  postop from TKA 2024, had increase swelling and RLE DVT noted  Antithrombotic therapy at time of VTE event: no  VTE tx course: initiated on Eliquis but then noted to have new onset SOB with CTPA showing possible new PE, transitioned to VKA since   PROVOKING FACTORS:   personal VTE hx? 10/2019 - - LLE swelling during post-op period after knee surgery - approximately 2 weeks postop had initial duplex, started on eliquis and, despite compliant therapy, then developed acute SOB, CP and found to have PE, switched to VKA as deemed eliquis failure  family VTE hx? No  Recent surgery ? Yes, Details: 10/16/20 - L knee athroscopy with Dr. Emmy HARRINGTON?  reports that she quit smoking about 47 years ago. Her smoking use included cigarettes. She started smoking about 60 years ago. She has a 13 pack-year smoking history. She has never used smokeless tobacco.    Other periods of immobility? Yes, Details: post-op     HLD: Remains off atorva due to cramping, Tolerating  nexlizet   HTN: Stable, tolerating meds with good adherence, Home BP los/70s  Current Outpatient Medications on File Prior to Visit   Medication Sig Dispense Refill    Bempedoic Acid (BEMPEDOIC) 180 MG Tab Take 180 mg by mouth every day. 28 Tablet 0    Bempedoic Acid-Ezetimibe 180-10  MG Tab Take 1 Capsule by mouth every day. 100 Tablet 3    fluticasone (FLONASE) 50 MCG/ACT nasal spray Administer 1 Spray into affected nostril(S) 1 time a day as needed. Indications: Stuffy Nose      acetaminophen (TYLENOL 8 HOUR) 650 MG CR tablet Take 2 tablets every 8 hours by oral route for 7 days. (Patient taking differently: Take 1,300 mg by mouth 3 times a day.) 42 Tablet 0    meloxicam (MOBIC) 15 MG tablet Take 15 mg by mouth every day.         B Complex Vitamins (VITAMIN B COMPLEX) Cap Take 1 Capsule by mouth every day.         Plant Sterols and Stanols (CHOLEST OFF PO) Take 1 Capsule by mouth every day.         ezetimibe (ZETIA) 10 MG Tab Take 1 Tablet by mouth every day. (Patient taking differently: Take 10 mg by mouth every day.   ) 100 Tablet 3    Diclofenac Sodium 1 % Cream Apply 1 Application topically 4 times a day. (Patient taking differently: Apply 1 Application  topically every day. Apply's on both knees) 120 g 3    Bempedoic Acid 180 MG Tab Take 1 Tablet by mouth every day. 30 Tablet 0    bisacodyl EC (DULCOLAX) 5 MG Tablet Delayed Response Take 2 tablets every day by oral route for 2 days. Stop if you develop loose stools or diarrhea (Patient taking differently: Take 5 mg by mouth 1 time a day as needed for Constipation.) 4 Tablet 0    oxyCODONE immediate-release (ROXICODONE) 5 MG Tab Take 2 tablets every 4-6 hours by oral route for 7 days. (Patient taking differently: Take 0.5-1 Tablets by mouth every 6 hours as needed for Severe Pain.) 27 Tablet 0    ondansetron (ZOFRAN ODT) 8 MG TABLET DISPERSIBLE Place 1 tablet twice a day by translingual route, for prn nausea. (Patient taking differently: Take 8 mg by mouth every 12 hours as needed for Nausea.) 10 Tablet 0     No current facility-administered medications on file prior to visit.     Allergies   Allergen Reactions    Iodine Anaphylaxis     IV Contrast= Anaphylaxis   Topical is OK    Other Environmental Runny Nose     Hayfever-sneezing     "Penicillins Rash     Rash covered entire torso.         Social History     Tobacco Use    Smoking status: Former     Current packs/day: 0.00     Average packs/day: 1 pack/day for 13.0 years (13.0 ttl pk-yrs)     Types: Cigarettes     Start date: 1964     Quit date:      Years since quittin.9    Smokeless tobacco: Never   Vaping Use    Vaping status: Never Used   Substance Use Topics    Alcohol use: Yes     Alcohol/week: 4.2 - 8.4 oz     Types: 7 - 14 Standard drinks or equivalent per week     Comment: nightly 1-2    Drug use: No     Comment: tried marijuana     DIET AND EXERCISE:  Weight Change: stable  Diet: common adult  Exercise: minimal exercise Limited by ortho pains/post op restrictions    Review of Systems   Constitutional:  Negative for chills, fever and malaise/fatigue.   HENT:  Negative for nosebleeds.    Respiratory:  Negative for cough, hemoptysis, sputum production, shortness of breath and wheezing.    Cardiovascular:  Negative for chest pain, palpitations, orthopnea, claudication, leg swelling and PND.   Gastrointestinal:  Negative for blood in stool and melena.   Genitourinary:  Negative for hematuria.   Endo/Heme/Allergies:  Does not bruise/bleed easily.        Objective   Vitals:    12/10/24 0951 12/10/24 0954   BP: 137/84 135/83   BP Location: Left arm Left arm   Patient Position: Sitting Sitting   BP Cuff Size: Large adult Large adult   Pulse: (!) 55 (!) 54   Weight: 76.2 kg (168 lb)    Height: 1.575 m (5' 2\")       BP Readings from Last 5 Encounters:   12/10/24 135/83   24 131/78   24 123/75   24 106/54   24 (!) 149/85      Body mass index is 30.73 kg/m².   Wt Readings from Last 3 Encounters:   12/10/24 76.2 kg (168 lb)   24 78.5 kg (173 lb)   24 77.7 kg (171 lb 4.8 oz)        Physical Exam  Vitals reviewed.   Constitutional:       General: She is not in acute distress.     Appearance: Normal appearance.   HENT:      Head: Normocephalic and " atraumatic.   Eyes:      General: Lids are normal.      Extraocular Movements: Extraocular movements intact.      Conjunctiva/sclera: Conjunctivae normal.   Neck:      Thyroid: No thyroid mass.      Vascular: Normal carotid pulses. No carotid bruit.      Trachea: Trachea normal.   Cardiovascular:      Rate and Rhythm: Normal rate and regular rhythm.      Chest Wall: PMI is not displaced.      Pulses: Normal pulses.           Carotid pulses are 2+ on the right side and 2+ on the left side.       Radial pulses are 2+ on the right side and 2+ on the left side.        Dorsalis pedis pulses are 2+ on the right side and 2+ on the left side.        Posterior tibial pulses are 2+ on the right side and 2+ on the left side.      Heart sounds: Normal heart sounds.      Comments: Stemmer's sign - negative bilat   No abdominal wall varicosities   Spider telangectasia:       RLE:  None      LLE: none   Varicosities:           RLE: none      LLE: none   Corona phlebectatica:      RLE:  None        LLE:  None   Cording:         RLE:  None     LLE: None         Pulmonary:      Effort: Pulmonary effort is normal.      Breath sounds: Normal breath sounds.   Musculoskeletal:      Cervical back: Full passive range of motion without pain and neck supple.      Right lower leg: No edema.      Left lower leg: No edema.   Skin:     General: Skin is warm and dry.      Capillary Refill: Capillary refill takes less than 2 seconds.      Coloration: Skin is not cyanotic.      Nails: There is no clubbing.      Comments: Stasis pigmentation:     RLE:  no     LLE: no   Stasis dermatitis:    RLE: no     LLE: no   Lipodermatosclerosis and/or atrophie kip:     RLE: no    LLE: no   Healed and/or active venous leg ulcers:     RLE: no     LLE: no    Neurological:      General: No focal deficit present.      Mental Status: She is alert and oriented to person, place, and time. Mental status is at baseline.      Coordination: Coordination is intact.       "Gait: Gait is intact.   Psychiatric:         Mood and Affect: Mood normal.         Behavior: Behavior normal.       Lab Results   Component Value Date/Time    CHOLSTRLTOT 167 12/05/2024 07:14 AM    LDL 76 12/05/2024 07:14 AM    HDL 80 12/05/2024 07:14 AM    TRIGLYCERIDE 55 12/05/2024 07:14 AM       Lab Results   Component Value Date/Time    LDL 76 12/05/2024 07:14 AM     (H) 08/19/2024 08:40 AM    LDL 81 05/02/2024 10:53 AM    LDL 85 10/05/2023 08:39 AM    LDL 68 03/17/2023 08:38 AM       Lab Results   Component Value Date/Time    LIPOPROTA 25 12/05/2024 07:12 AM      Lab Results   Component Value Date/Time    APOB 69 12/05/2024 07:14 AM      No results found for: \"CRPHIGHSEN\"    Lab Results   Component Value Date/Time    SODIUM 139 12/05/2024 07:14 AM    POTASSIUM 4.9 12/05/2024 07:14 AM    CHLORIDE 104 12/05/2024 07:14 AM    CO2 25 12/05/2024 07:14 AM    GLUCOSE 95 12/05/2024 07:14 AM    BUN 18 12/05/2024 07:14 AM    CREATININE 0.65 12/05/2024 07:14 AM    CREATININE 0.9 10/05/2007 12:50 PM     Lab Results   Component Value Date/Time    ALKPHOSPHAT 76 12/05/2024 07:14 AM    ASTSGOT 23 12/05/2024 07:14 AM    ALTSGPT 18 12/05/2024 07:14 AM    TBILIRUBIN 0.6 12/05/2024 07:14 AM       Lab Results   Component Value Date    CHOLSTRLTOT 167 12/05/2024    LDL 76 12/05/2024    HDL 80 12/05/2024    TRIGLYCERIDE 55 12/05/2024         Lab Results   Component Value Date    HBA1C 6.2 (H) 12/05/2024      Lab Results   Component Value Date    SODIUM 139 12/05/2024    POTASSIUM 4.9 12/05/2024    CHLORIDE 104 12/05/2024    CO2 25 12/05/2024    GLUCOSE 95 12/05/2024    BUN 18 12/05/2024    CREATININE 0.65 12/05/2024        Lab Results   Component Value Date    WBC 5.5 12/05/2024    RBC 5.13 12/05/2024    HEMOGLOBIN 14.8 12/05/2024    HEMATOCRIT 45.7 12/05/2024    MCV 89.1 12/05/2024    MCH 28.8 12/05/2024    MCHC 32.4 12/05/2024    MPV 10.3 12/05/2024      Latest Reference Range & Units 12/05/24 07:12   Anti-Cardiolipin Ab Iga " <=11 APL <10   Anti-Cardiolipin Ab Igm <=12 MPL <10   Anti-Cariolipin Ab Igg <=14 GPL <10   Beta-2 Glycoprotein I Ab Igg <=20 SGU <10   Beta-2 Glycoprotein I Iga <=20 ANJALI <10   Beta-2 Glycoprotein I Igm <=20 SMU <10      12/05/24 07:12   Factor Ii Dna Analysis Pt Gene Negative         IMAGING:     Echo 2017  No prior study is available for comparison.   Normal left ventricular systolic function.  Normal left ventricular wall thickness.  Mild aortic stenosis.  Mild tricuspid regurgitation.  Estimated right ventricular systolic pressure is 45 mmHg.  No prior study is available for comparison.   Aorta  Ascending aorta is dilated with a diameter of 4.1 cm.    E venous 11/6/2019    Venous duplex imaging was performed in only the left lower extremity    including serial compressions and spectral Doppler flow evaluation.   Partially compressible left femoral vein mid & distal with subacute    appearing softly echogenic material (thrombus) in the lumen. Incompressible    left popliteal and gastrocnemius veins with subacute appearing mixed    echogenic material (thrombus) in the lumen.    Partially compressible left peroneal vein with subacute appearing thrombus    in the lumen.    Echo 11/6/19  Prior Echo - 5/19/17.  Normal left ventricular systolic function.   No evidence of valvular abnormality based on Doppler evaluation.   Compared to the images of the prior study done -  there has been no   significant change.   Ascending aorta is dilated with a diameter of 4.0 cm.    CTPA 11/5/19  1.  Isolated pulmonary embolus is identified in superior segment right lower pulmonary lobe.   2.  RV LV ratio is elevated.   3.  There are 2 right lung pulmonary nodules. Largest measures 9 mm in diameter. Recommend follow-up CT in 3-6 months.    E venous 5/22/20    Chronic, partial thrombus with flow recanalization in the LEFT popliteal    vein. Chronic, occlusive thrombus in the LEFT peroneal trunk vein.     Compared to the images of  the duplex dated 11/6/19 - there has been an    evolution of the DVT.    CXR 2/2021   No acute cardiopulmonary findings.    VQ scan 2/2021    Low probability of pulmonary embolism.    Echo 2/2021   Compared to the images of the prior study done 11/6/2019 - the estimate   of right ventricular systolic pressure is increased, previously 25   mmHg.  Normal left ventricular systolic function.  Left ventricular ejection fraction is visually estimated to be 65%.  Normal right ventricular size and systolic function.  Mild mitral regurgitation.  Mild aortic insufficiency.  Mild tricuspid regurgitation.  Estimated right ventricular systolic pressure is 40 mmHg.  Ascending aorta is borderline dilated with a diameter of 4.0 cm.    Echo 3/2022   Aorta  Normal aortic root for body surface area. The ascending aorta is   dilated with a diameter of 4-4.2 cm.  The arch is normal size.  The left ventricular ejection fraction is visually estimated to be 65-  70%.  Aortic valve sclerosis without significant stenosis.  Estimated right ventricular systolic pressure is 32-35 mmHg.  The ascending aorta is dilated with a diameter of 4-4.2 cm, difficult   to see borders in some views, difficult to see borders in some views.   Compared to the prior echo on 02/10/21, probably no significant change.    MRA Chest 1/23/23   1.  MRA of the thoracic aorta demonstrates fusiform aneurysm of the ascending thoracic aorta currently measuring 4.1 x 4.0 cm.   2.  No evidence of aortic dissection.   3.  No other aneurysm identified.   4.  Branch vessels of the aorta appear patent.    Echo 5/6/2024  Prior study on 3/24/22, compared to the report of the prior study,   there has been no significant change.   Normal left ventricular systolic function.  The left ventricular ejection fraction is visually estimated to be 55%.  Mild mitral regurgitation.  Mild aortic insufficiency.  Mild tricuspid regurgitation.  Estimated right ventricular systolic pressure is 35  "mmHg.  The ascending aorta diameter is 4.1  cm.    RLE venous duplex 8/5/2024  Acute to subacute, occlusive deep venous thrombus in the one of the right  middle leg posterior tibial veins.    RLE venous duplex 8/13/2024   Acute to subacute, occlusive thrombus involving one of the paired posterior  tibial veins in the mid calf.   No change since exam dated 8/5/24.               Medical Decision Making:  Today's Assessment / Status / Plan:     1. 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 Ascending aorta dilation (HCC)        3. Antiplatelet or antithrombotic long-term use  aspirin 81 MG EC tablet      4. Prediabetes        5. History of pulmonary embolus (PE)            Established CVD:     1) Ascending aortic dilation:  stable, no sx   Presumed cystic medial degeneration with sporadic development.   Echo shows no biscupid Ao valve  No fhx of pmhx of connective tissue disease  1/2023 MRA = 4.1cm   5/2024 echo = 4.1cm - no growth  - reviewed anatomy, risks, emergency s/s requiring immediate attention and gave handouts.   -  Echo in q2 years - next 5/2026  - focus on ARB and BB for BP control if HTN developed   - activity restrictions including no extreme endurance activities, smoking, stimulants, and extreme weight lifting >20lbs     2) recurrent, provoked VTE disease - stable, resolved sx   11/2019 -acute, ortho surg-associated LLE DVT complicated by PE, s/p 3mo DOAC   8/2024- acute, TKA-associated RLE paired posterior tibial veins DVT   Thrombophilia/hypercoag evaluation:  neg APLAs, PTGM.  Pending FVL  Plan:  - consider ATIII, prot c/s and lupus AC testing after off eliquis   - see OAC plan, no further imaging   - continue knee-high compression socks, 20-30mmHg, as much as tolerated    - increase walking, avoid prolonged standing   - elevated legs while sitting and sleeping above heart level  - reduce sodium (\"salt\") in diet to less than " "2,000mg daily   - counseled on signs and symptoms of acute VTE that require seeking prompt attention to include shortness of breath, chest pain, pain with deep inhalation, acute leg swelling and/or pain in calf or leg   - elevate legs as much as possible, use compression stockings/socks if directed by your provider  - Avoid hormonal therapies including estrogen or testosterone-containing meds, or raloxifene or tamoxifene (commonly used for osteoporosis)  - Avoid sedentary periods  - continue complete avoidance of tobacco products  - if having any invasive procedure,please make sure the doctor knows of your history of blood clots and current anticoagulation status.  Would recommend full dose OAC postop for any future ortho surgeries    ANTITHROMBOTIC THERAPY:  Date of initiation: 11/2019 - completed VKA 2/2019, restarted Eliquis 8/2024   HAS-BLED bleeding risk calc ([email or web address]): 1 pt, 3.4%, low risk  Factors to consider for indefinite OAC:  recurrent postop VTE   Last CBC, BMP: as reviewed above  Expected duration: Expected duration: reviewed standard of care as per Chest 2021 guidelines is 3-6 months minimum on full dose OAC.  Reviewed with pt we could consider indefinite therapy given recurrent VTE, although each episode was was provoked after surgery.  Hypercoag panel not completed previously, will obtain at least partial hypercoag panel while on DOAC.  Course complicated with possible question previous \"DOAC failure\" after initial DVT and subsequent PE development after 2 weeks on DOAC, pt subsequently switched to VKA for remainder of treatment.  Current VTE developed while on prophylaxis dose of Eliquis 2.5mg with reported complete adherence.  Would not consider Eliquis failure as was only on therapeutic dose and will precede with full dose Eliquis for 3-6 months.     - as of 12/2024 has completed 3mo fully adherence full dose Eliquis, after review of risks/benefits/alternatives, through shared decision-making, " "we will d/c DOAC and start ASA   plan:  - stop eliquis, start ASA 81mg daily     LIPID MANAGEMENT:   Qualifies for Statin Therapy Based on 2018 ACC/AHA Guidelines: yes, Primary Prevention - 40-76yo, LDLc >70, <190 w/o DM  The 10-year ASCVD risk score (Miya STEWART, et al., 2019) is: 17.5%  7.5 - <20% \"intermediate risk\" .   Major ASCVD events: None  High-risk conditions: N/A  Risk-enhancers: None  Currently on Statin:  no - statin intolerance to multiple agents - Had leg/groin pains on rosuvastatin; myalgias on atorva  Treatment goals: LDL-C <100   At goal? Yes, 12/2024   Plan:   - intensify lifestyle measures as noted - given LDL hand out previously  - continue nexlizet   - increase hydration 60-80 oz per day   - check lipid panel in 3 months     BLOOD PRESSURE MANAGEMENT:  ACC/AHA (2017) goal <130/80  Home BP at goal: yes, 120-126/70s, occasional 130/70 on log   Office BP at goal:  No, mild high SBP   Indications of end organ damage: dilated Ascending Ao, LVH per EKG   Indicated medications for reduction of expansion rate of TAA:   -Beta-blocker (decreases pulsatile wall stress)  -Losartan (inhibits tissue growth factor beta, decreasing aneurysmal growth rate or rate of expansion)  Plan:   - continue home BP monitoring; call if consistently >135/85- discussed and demonstrated correct technique at today's visit. Gave home log; instructed to bring to f/u appt  Medications:  Consider losartan as 1st line     GLYCEMIC STATUS:  Prediabetic  A1c improved on most recent labs; down to 5.5 (was 6.0) at highest  -continue to focus on TLC; limit simple carbs/sugars, increase fiber, increase exercise    LIFESTYLE INTERVENTIONS  TOBACCO:    reports that she quit smoking about 47 years ago. Her smoking use included cigarettes. She started smoking about 60 years ago. She has a 13 pack-year smoking history. She has never used smokeless tobacco.   - continued complete avoidance of all tobacco products   PHYSICAL ACTIVITY: " >150min/week of mod-intensity activity or as much as tolerated  NUTRITION: Mediterranean, Plant-based - increase nuts, beans, whole grains, substitute plant protein for animal 1-2 times/week, and reduce Na to <2,300mg/day    ETOH: limit to 1 or less standard drinks/day  WT MGMT: maintain healthy weight     OTHER:  none      STUDIES:  echo 5/2026  - not ordered, RN to track   LABS: as noted above  FOLLOW-UP:  12 months     Aravind Chapman M.D.  [facility]   [facility]   [phone]

## 2024-12-11 LAB — F5 P.R506Q BLD/T QL: NEGATIVE

## 2025-01-02 ENCOUNTER — TELEPHONE (OUTPATIENT)
Dept: PHARMACY | Facility: MEDICAL CENTER | Age: 75
End: 2025-01-02
Payer: MEDICARE

## 2025-01-02 NOTE — TELEPHONE ENCOUNTER
Prior Authorization for NEXLIZET /10MG  has been approved for a quantity of 90 , day supply 90    Prior Authorization reference number: [id number]  Insurance: [employer]  Effective dates: 12/01/2024 - 06/20/2025  Copay: Unsure     Is patient eligible to fill with [facility]? Yes    Next Steps:  PA Approved [id number]. NEXLIZET /10MG is approved through 06/20/2025, #90/90 DS, TC - RTS - Refill Payable on or after 02/18/25 DUR-Refill too Soon.

## 2025-02-28 NOTE — PATIENT INSTRUCTIONS
Patient given written instructions regarding  imaging, medications, referrals, dietary and lifestyle management, and return visit.    Ayaan Cormier MD       Adequate: hears normal conversation without difficulty

## 2025-05-22 ENCOUNTER — TELEPHONE (OUTPATIENT)
Dept: VASCULAR LAB | Facility: MEDICAL CENTER | Age: 75
End: 2025-05-22
Payer: MEDICARE

## 2025-05-22 NOTE — TELEPHONE ENCOUNTER
Received Renewal request via FAX  for NEXLIZET 180-10 MG TABLETS. (Quantity:90, Day Supply:90)     Insurance: [employer]   Member ID:  [id number]  BIN: [id number]  PCN: [id number]  Group: [id number]     Ran Test claim via [facility] & medication Rejects stating prior authorization is required.    MARILIA Washburn, PhT  Vascular Pharmacy Liaison (Rx Coordinator)  P: [phone]  5/22/2025 9:01 AM

## 2025-05-22 NOTE — TELEPHONE ENCOUNTER
"PA renewal request for NEXLIZET 180-10 MG TABLETS via CMM (KEY: [id number]) has been cancelled due to following reason/s:     \"This medication or product was previously approved [id number] from 2025-01-01 to 2025-12-31. **Please note: This request was submitted electronically. Formulary lowering, tiering exception, cost reduction and/or pre-benefit determination review (including prospective Medicare hospice reviews) requests cannot be requested using this method of submission. Providers contact us at [phone] for further assistance\".    No further actions given at this time.     MARILIA Washburn, PhT  Vascular Pharmacy Liaison (Rx Coordinator)  P: [phone]  5/22/2025 9:11 AM      "

## 2025-05-22 NOTE — TELEPHONE ENCOUNTER
Prior Authorization for NEXLIZET 180-10 MG TABLETS.  (Quantity: 90, Days: 90) has been submitted via Cover My Meds: Key ([id number])    Insurance: [employer] D     Will follow up in 24-48 business hours.     Mackenzie Wall, MARILIA, PhT  Vascular Pharmacy Liaison (Rx Coordinator)  P: [phone]  5/22/2025 9:02 AM

## (undated) DEVICE — GOWN WARMING STANDARD FLEX - (30/CA)

## (undated) DEVICE — HUMID-VENT HEAT AND MOISTURE EXCHANGE- (50/BX)

## (undated) DEVICE — GLOVE BIOGEL SZ 8 SURGICAL PF LTX - (50PR/BX 4BX/CA)

## (undated) DEVICE — GLOVE, LITE (PAIR)

## (undated) DEVICE — SUTURE 3-0 ETHILON FS-1 - (36/BX) 30 INCH

## (undated) DEVICE — SODIUM CHL IRRIGATION 0.9% 1000ML (12EA/CA)

## (undated) DEVICE — PROTECTOR ULNA NERVE - (36PR/CA)

## (undated) DEVICE — SUTURE 3-0 PROLENE PS-1 (12PK/BX)

## (undated) DEVICE — HEAD HOLDER JUNIOR/ADULT

## (undated) DEVICE — PADDING CAST 6 IN STERILE - 6 X 4 YDS (24/CA)

## (undated) DEVICE — SYSTEM NAVIGATION VIZADISC KNEE PROCEDURE TRACKING KIT (1EA)

## (undated) DEVICE — ELECTRODE 850 FOAM ADHESIVE - HYDROGEL RADIOTRNSPRNT (50/PK)

## (undated) DEVICE — CHLORAPREP 26 ML APPLICATOR - ORANGE TINT(25/CA)

## (undated) DEVICE — DRAPE LOWER EXTREMETY - (6/CA)

## (undated) DEVICE — CANISTER SUCTION RIGID RED 1500CC (40EA/CA)

## (undated) DEVICE — ELECTRODE DUAL RETURN W/ CORD - (50/PK)

## (undated) DEVICE — SODIUM CHL. IRRIGATION 0.9% 3000ML (4EA/CA 65CA/PF)

## (undated) DEVICE — LACTATED RINGERS INJ 1000 ML - (14EA/CA 60CA/PF)

## (undated) DEVICE — BLADE SAW 90X25X1.37MM SAGITTAL DUAL CUT

## (undated) DEVICE — NEEDLE SAFETY 18 GA X 1 1/2 IN (100EA/BX)

## (undated) DEVICE — TUBE CONNECTING SUCTION - CLEAR PLASTIC STERILE 72 IN (50EA/CA)

## (undated) DEVICE — SUTURE GENERAL

## (undated) DEVICE — SUTURE 2-0 MONOCRYL PLUS UNDYED CT-1 1 X 36 (36EA/BX)"

## (undated) DEVICE — TIP INTPLS HFLO ML ORFC BTRY - (12/CS)  FOR SURGILAV

## (undated) DEVICE — BANDAGE ELASTIC 6 IN X 5 YDS - LATEX FREE (10/BX)

## (undated) DEVICE — STAPLER SKIN DISP - (6/BX 10BX/CA) VISISTAT

## (undated) DEVICE — SYRINGE DISP. 60 CC LL - (30/BX, 12BX/CA)**WHEN THESE ARE GONE ORDER #500206**

## (undated) DEVICE — BAG, SPONGE COUNT 50600

## (undated) DEVICE — KIT ANESTHESIA W/CIRCUIT & 3/LT BAG W/FILTER (20EA/CA)

## (undated) DEVICE — SET EXTENSION WITH 2 PORTS (48EA/CA) ***PART #2C8610 IS A SUBSTITUTE*****

## (undated) DEVICE — PAD PREP 24 X 48 CUFFED - (100/CA)

## (undated) DEVICE — DRAPE SURGICAL U 77X120 - (10/CA)

## (undated) DEVICE — SPONGE GAUZESTER 4 X 4 4PLY - (128PK/CA)

## (undated) DEVICE — MIXER BONE CEMENT REVOLUTION - W/FEMORAL PRESSURIZER (6/CA)

## (undated) DEVICE — HANDPIECE 10FT INTPLS SCT PLS IRRIGATION HAND CONTROL SET (6/PK)

## (undated) DEVICE — SUTURE 1 VICRYL PLUS CTX - 8 X 18 INCH (12/BX)

## (undated) DEVICE — TOWEL STOP TIMEOUT SAFETY FLAG (40EA/CA)

## (undated) DEVICE — GLOVE SURGICAL PROTEXIS PI 8.0 LF - (50PR/BX)

## (undated) DEVICE — GOWN SURGICAL X-LARGE ULTRA - FILM-REINFORCED (20/CA)

## (undated) DEVICE — COVER LIGHT HANDLE FLEXIBLE - SOFT (2EA/PK 80PK/CA)

## (undated) DEVICE — BANDAGE ELASTIC DOUBLE 6X10 - (6EA/BX)"

## (undated) DEVICE — Device

## (undated) DEVICE — MASK ANESTHESIA ADULT  - (100/CA)

## (undated) DEVICE — DRESSING XEROFORM 1X8 - (50/BX 4BX/CA)

## (undated) DEVICE — NEPTUNE 4 PORT MANIFOLD - (20/PK)

## (undated) DEVICE — SENSOR SPO2 NEO LNCS ADHESIVE (20/BX) SEE USER NOTES

## (undated) DEVICE — PIN FIXATION BONE STERILE 3.2MM X 140MM (2EA/PK)

## (undated) DEVICE — KIT DRAPE RIO ONE PIECE WITH POCKETS (1EA)

## (undated) DEVICE — DRAPE LARGE 3 QUARTER - (20/CA)

## (undated) DEVICE — PIN FIXATION BONE STERILE 3.2MM X 110MM (2EA/PK)

## (undated) DEVICE — WATER IRRIGATION STERILE 1000ML (12EA/CA)

## (undated) DEVICE — BLADE SAGITTAL SAW DUAL CUT 25.0 X 90.0 X 1.27MM (1/EA)

## (undated) DEVICE — TUBING CLEARLINK DUO-VENT - C-FLO (48EA/CA)

## (undated) DEVICE — DRESSING AQUACEL AG ADVANTAGE 3.5 X 10" (10EA/BX)"

## (undated) DEVICE — PACK TOTAL KNEE  (1/CA)

## (undated) DEVICE — BLADE 90X18X1.27MM SAW SAGITTAL

## (undated) DEVICE — GLOVE BIOGEL INDICATOR SZ 8 SURGICAL PF LTX - (50/BX 4BX/CA)

## (undated) DEVICE — BLADE SHAVER AGGRESSIVE PLUS 4.0MM ANGLED (5EA/BX)

## (undated) DEVICE — DRAPE IOBAN LARGE 2 INCISE FILM (5EA/CA)

## (undated) DEVICE — SUCTION INSTRUMENT YANKAUER BULBOUS TIP W/O VENT (50EA/CA)

## (undated) DEVICE — SENSOR OXIMETER ADULT SPO2 RD SET (20EA/BX)

## (undated) DEVICE — KIT ROOM DECONTAMINATION

## (undated) DEVICE — LENS/HOOD FOR SPACESUIT - (32/PK) PEEL AWAY FACE

## (undated) DEVICE — STOCKINETTE IMPERVIOUS 12X48 - STERILELF (10/CA)"

## (undated) DEVICE — BAG SPONGE COUNT 10.25 X 32 - BLUE (250/CA)